# Patient Record
Sex: MALE | Race: BLACK OR AFRICAN AMERICAN | Employment: OTHER | URBAN - METROPOLITAN AREA
[De-identification: names, ages, dates, MRNs, and addresses within clinical notes are randomized per-mention and may not be internally consistent; named-entity substitution may affect disease eponyms.]

---

## 2017-10-11 ENCOUNTER — HOSPITAL ENCOUNTER (OUTPATIENT)
Dept: OTHER | Age: 82
Discharge: HOME | End: 2017-10-11
Payer: MEDICARE

## 2017-10-11 DIAGNOSIS — M25.551 BILATERAL HIP PAIN: ICD-10-CM

## 2017-10-11 DIAGNOSIS — M25.552 BILATERAL HIP PAIN: ICD-10-CM

## 2017-10-11 PROCEDURE — 73502 X-RAY EXAM HIP UNI 2-3 VIEWS: CPT

## 2017-11-07 PROBLEM — R97.20 ELEVATED PSA: Status: ACTIVE | Noted: 2017-11-07

## 2018-02-08 ENCOUNTER — APPOINTMENT (OUTPATIENT)
Dept: GENERAL RADIOLOGY | Age: 83
End: 2018-02-08
Attending: PHYSICIAN ASSISTANT
Payer: MEDICARE

## 2018-02-08 ENCOUNTER — HOSPITAL ENCOUNTER (EMERGENCY)
Age: 83
Discharge: HOME | End: 2018-02-08
Attending: EMERGENCY MEDICINE
Payer: MEDICARE

## 2018-02-08 VITALS
WEIGHT: 135 LBS | HEART RATE: 93 BPM | OXYGEN SATURATION: 98 % | DIASTOLIC BLOOD PRESSURE: 59 MMHG | HEIGHT: 66 IN | RESPIRATION RATE: 16 BRPM | TEMPERATURE: 98 F | BODY MASS INDEX: 21.69 KG/M2 | SYSTOLIC BLOOD PRESSURE: 124 MMHG

## 2018-02-08 DIAGNOSIS — M72.2 PLANTAR FASCIITIS: Primary | ICD-10-CM

## 2018-02-08 DIAGNOSIS — M79.672 LEFT FOOT PAIN: ICD-10-CM

## 2018-02-08 PROCEDURE — 99283 EMERGENCY DEPT VISIT LOW MDM: CPT

## 2018-02-08 PROCEDURE — 73630 X-RAY EXAM OF FOOT: CPT

## 2018-02-08 PROCEDURE — 74011250637 HC RX REV CODE- 250/637: Performed by: PHYSICIAN ASSISTANT

## 2018-02-08 RX ORDER — IBUPROFEN 600 MG/1
600 TABLET ORAL
Status: COMPLETED | OUTPATIENT
Start: 2018-02-08 | End: 2018-02-08

## 2018-02-08 RX ORDER — TRAMADOL HYDROCHLORIDE 50 MG/1
50 TABLET ORAL
Qty: 5 TAB | Refills: 0 | Status: SHIPPED | OUTPATIENT
Start: 2018-02-08 | End: 2019-04-03

## 2018-02-08 RX ADMIN — IBUPROFEN 600 MG: 600 TABLET, FILM COATED ORAL at 09:01
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
## 2018-02-08 NOTE — DISCHARGE INSTRUCTIONS
Foot Pain: Care Instructions  Your Care Instructions  Foot injuries that cause pain and swelling are fairly common. Almost all sports or home repair projects can cause a misstep that ends up as foot pain. Normal wear and tear, especially as you get older, also can cause foot pain. Most minor foot injuries will heal on their own, and home treatment is usually all you need to do. If you have a severe injury, you may need tests and treatment. Follow-up care is a key part of your treatment and safety. Be sure to make and go to all appointments, and call your doctor if you are having problems. It's also a good idea to know your test results and keep a list of the medicines you take. How can you care for yourself at home? · Take pain medicines exactly as directed. ¨ If the doctor gave you a prescription medicine for pain, take it as prescribed. ¨ If you are not taking a prescription pain medicine, ask your doctor if you can take an over-the-counter medicine. · Rest and protect your foot. Take a break from any activity that may cause pain. · Put ice or a cold pack on your foot for 10 to 20 minutes at a time. Put a thin cloth between the ice and your skin. · Prop up the sore foot on a pillow when you ice it or anytime you sit or lie down during the next 3 days. Try to keep it above the level of your heart. This will help reduce swelling. · Your doctor may recommend that you wrap your foot with an elastic bandage. Keep your foot wrapped for as long as your doctor advises. · If your doctor recommends crutches, use them as directed. · Wear roomy footwear. · As soon as pain and swelling end, begin gentle exercises of your foot. Your doctor can tell you which exercises will help. When should you call for help? Call 911 anytime you think you may need emergency care. For example, call if:  ? · Your foot turns pale, white, blue, or cold.    ?Call your doctor now or seek immediate medical care if:  ? · You cannot move or stand on your foot. ? · Your foot looks twisted or out of its normal position. ? · Your foot is not stable when you step down. ? · You have signs of infection, such as:  ¨ Increased pain, swelling, warmth, or redness. ¨ Red streaks leading from the sore area. ¨ Pus draining from a place on your foot. ¨ A fever. ? · Your foot is numb or tingly. ? Watch closely for changes in your health, and be sure to contact your doctor if:  ? · You do not get better as expected. ? · You have bruises from an injury that last longer than 2 weeks. Where can you learn more? Go to http://luciano-chip.info/. Enter K560 in the search box to learn more about \"Foot Pain: Care Instructions. \"  Current as of: March 21, 2017  Content Version: 11.4  © 0231-5899 [employer]. Care instructions adapted under license by [employer] (which disclaims liability or warranty for this information). If you have questions about a medical condition or this instruction, always ask your healthcare professional. [address] any warranty or liability for your use of this information. Plantar Fasciitis: Care Instructions  Your Care Instructions    Plantar fasciitis is pain and inflammation of the plantar fascia, the tissue at the bottom of your foot that connects the heel bone to the toes. The plantar fascia also supports the arch. If you strain the plantar fascia, it can develop small tears and cause heel pain when you stand or walk. Plantar fasciitis can be caused by running or other sports. It also may occur in people who are overweight or who have high arches or flat feet. You may get plantar fasciitis if you walk or stand for long periods, or have a tight Achilles tendon or calf muscles. You can improve your foot pain with rest and other care at home. It might take a few weeks to a few months for your foot to heal completely.   Follow-up care is a key part of your treatment and safety. Be sure to make and go to all appointments, and call your doctor if you are having problems. It's also a good idea to know your test results and keep a list of the medicines you take. How can you care for yourself at home? · Rest your feet often. Reduce your activity to a level that lets you avoid pain. If possible, do not run or walk on hard surfaces. · Take pain medicines exactly as directed. ¨ If the doctor gave you a prescription medicine for pain, take it as prescribed. ¨ If you are not taking a prescription pain medicine, take an over-the-counter anti-inflammatory medicine for pain and swelling, such as ibuprofen (Advil, Motrin) or naproxen (Aleve). Read and follow all instructions on the label. · Use ice massage to help with pain and swelling. You can use an ice cube or an ice cup several times a day. To make an ice cup, fill a paper cup with water and freeze it. Cut off the top of the cup until a half-inch of ice shows. Hold onto the remaining paper to use the cup. Rub the ice in small circles over the area for 5 to 7 minutes. · Contrast baths, which alternate hot and cold water, can also help reduce swelling. But because heat alone may make pain and swelling worse, end a contrast bath with a soak in cold water. · Wear a night splint if your doctor suggests it. A night splint holds your foot with the toes pointed up and the foot and ankle at a 90-degree angle. This position gives the bottom of your foot a constant, gentle stretch. · Do simple exercises such as calf stretches and towel stretches 2 to 3 times each day, especially when you first get up in the morning. These can help the plantar fascia become more flexible. They also make the muscles that support your arch stronger. Hold these stretches for 15 to 30 seconds per stretch. Repeat 2 to 4 times. ¨ Stand about 1 foot from a wall. Place the palms of both hands against the wall at chest level.  Lean forward against the wall, keeping one leg with the knee straight and heel on the ground while bending the knee of the other leg. ¨ Sit down on the floor or a mat with your feet stretched in front of you. Roll up a towel lengthwise, and loop it over the ball of your foot. Holding the towel at both ends, gently pull the towel toward you to stretch your foot. · Wear shoes with good arch support. Athletic shoes or shoes with a well-cushioned sole are good choices. · Try heel cups or shoe inserts (orthotics) to help cushion your heel. You can buy these at many shoe stores. · Put on your shoes as soon as you get out of bed. Going barefoot or wearing slippers may make your pain worse. · Reach and stay at a good weight for your height. This puts less strain on your feet. When should you call for help? Call your doctor now or seek immediate medical care if:  · You have heel pain with fever, redness, or warmth in your heel. · You cannot put weight on the sore foot. Watch closely for changes in your health, and be sure to contact your doctor if:  · You have numbness or tingling in your heel. · Your heel pain lasts more than 2 weeks. Where can you learn more? Go to http://luciano-chip.info/. Jeancarlos Crowley in the search box to learn more about \"Plantar Fasciitis: Care Instructions. \"  Current as of: March 21, 2017  Content Version: 11.4  © 7897-5635 [employer]. Care instructions adapted under license by [employer] (which disclaims liability or warranty for this information). If you have questions about a medical condition or this instruction, always ask your healthcare professional. [place] [address] any warranty or liability for your use of this information. Arch Pain: Exercises  Your Care Instructions  Here are some examples of typical rehabilitation exercises for your condition. Start each exercise slowly. Ease off the exercise if you start to have pain.   Your doctor or physical therapist will tell you when you can start these exercises and which ones will work best for you. How to do the exercises  Plantar fascia stretch    1. Sit in a chair and put your affected foot on your other knee. 2. Hold the heel of your foot in one hand, and grasp your toes with the other hand. 3. Pull on your heel (toward your body), and at the same time pull your toes back with your other hand. 4. You should feel a stretch along the bottom of your foot. 5. Hold 15 to 30 seconds. 6. Repeat 2 to 4 times. Plantar fascia stretch (kneeling)    You may want to place a pillow under your knees for this exercise. 1. Get on your hands and knees on the floor. Keep your heels pointing up and the balls of your feet and your toes on the floor. 2. Slowly sit back toward your ankles. 3. If this is too hard, you can try doing it one leg at a time. Stand up, and then kneel on one knee and keep the other leg forward. Place the foot of your forward leg flat on the ground and bend that knee. The heel on the leg still behind you should point up. The ball and toes of that foot should be on the floor. Sit back toward that ankle. 4. Hold 15 to 30 seconds. 5. Repeat 2 to 4 times. Switch legs if you are doing this one leg at a time. Plantar fascia self-massage    1. Sit in a chair. 2. Place your affected foot on a firm, tube-shaped object, such as a can or water bottle. 3. Roll your foot back and forth over the object to massage the bottom of your foot. 4. If you want to do ice massage, fill a water bottle about three-fourths of the way full and freeze before using. 5. Continue for 2 to 5 minutes. Bilateral calf stretch (knees straight)    1. Place a book on the floor a few inches from a wall or countertop, and put the balls of your feet on it. Your heels should be on the floor. The book needs to be thick enough so that you can feel a gentle stretch in each calf.  If you are not steady on your feet, hold on to a chair, counter, or wall while you do this stretch. 2. Keep your knees straight, and lean forward until you feel a stretch in each calf. 3. To get more stretch, add another book or use a thicker book, such as a phone book, a dictionary, or an encyclopedia. 4. Hold the stretch for at least 15 to 30 seconds. 5. Repeat 2 to 4 times. Bilateral calf stretch (knees bent)    1. Place a book on the floor a few inches from a wall or countertop, and put the balls of your feet on it. Your heels should be on the floor. The book needs to be thick enough so that you can feel a gentle stretch in each calf. If you are not steady on your feet, hold on to a chair, counter, or wall while you do this stretch. 2. Bend your knees, and lean forward until you feel a stretch in each calf. 3. To get more stretch, add another book or use a thicker book, such as a phone book, a dictionary, or an encyclopedia. 4. Hold the stretch for at least 15 to 30 seconds. 5. Repeat 2 to 4 times. [place] pick-ups    1. Put some marbles on the floor next to a cup.  2. Sit down, and use the toes of your affected foot to lift up one marble from the floor at a time. Then try to put the marble in the cup.  3. Repeat 8 to 12 times. Towel scrunches    1. Sit down, and place your affected foot on a towel on the floor. You may also do this with both feet on the towel. 2. Scrunch the towel toward you with your toes. Then use your toes to push the towel back into place. 3. Repeat 8 to 12 times. Heel raises on a step    1. Stand on the bottom step of a staircase, facing up toward the stairs. Put the balls of your feet on the step. If you are not steady on your feet, hold on to the banister or wall. 2. Keeping both knees straight, slowly lift your heels above the step so that you are standing on your toes. Then slowly lower your heels below the step and toward the floor. 3. Return to the starting position, with your feet even with the step.   4. Repeat 8 to 12 times. Follow-up care is a key part of your treatment and safety. Be sure to make and go to all appointments, and call your doctor if you are having problems. It's also a good idea to know your test results and keep a list of the medicines you take. Where can you learn more? Go to http://luciano-chip.info/. Enter H119 in the search box to learn more about \"Arch Pain: Exercises. \"  Current as of: March 21, 2017  Content Version: 11.4  © 2579-0555 [employer]. Care instructions adapted under license by [employer] (which disclaims liability or warranty for this information). If you have questions about a medical condition or this instruction, always ask your healthcare professional. [address] any warranty or liability for your use of this information.

## 2018-02-08 NOTE — ED TRIAGE NOTES
Left foot pain after getting pedicure last Thursday. Soaked foot in epson salts and rubbed pain cream on it.

## 2018-04-16 ENCOUNTER — HOSPITAL ENCOUNTER (OUTPATIENT)
Dept: GENERAL RADIOLOGY | Age: 83
Discharge: HOME | End: 2018-04-16
Payer: MEDICARE

## 2018-04-16 DIAGNOSIS — M25.511 RIGHT SHOULDER PAIN: ICD-10-CM

## 2018-04-16 PROCEDURE — 73030 X-RAY EXAM OF SHOULDER: CPT

## 2019-02-05 ENCOUNTER — DOCUMENTATION ONLY (OUTPATIENT)
Dept: FAMILY MEDICINE CLINIC | Age: 84
End: 2019-02-05

## 2019-02-05 NOTE — PROGRESS NOTES
Patient did not show up to their scheduled new patient appt on 2/4/19. Per Dr. Kisha Benitez patient is discharged from this practice. Thank you.

## 2019-02-05 NOTE — PROGRESS NOTES
New patient did not arrive to their scheduled appointment on 2/4/19. No show letter #1 sent on 02/05/19. Thank you.

## 2019-02-21 ENCOUNTER — OFFICE VISIT (OUTPATIENT)
Dept: FAMILY MEDICINE CLINIC | Age: 84
End: 2019-02-21

## 2019-02-21 ENCOUNTER — HOSPITAL ENCOUNTER (OUTPATIENT)
Dept: LAB | Age: 84
Discharge: HOME | End: 2019-02-21
Payer: MEDICARE

## 2019-02-21 ENCOUNTER — TELEPHONE (OUTPATIENT)
Dept: FAMILY MEDICINE CLINIC | Age: 84
End: 2019-02-21

## 2019-02-21 VITALS
WEIGHT: 130 LBS | OXYGEN SATURATION: 96 % | HEART RATE: 91 BPM | HEIGHT: 67 IN | RESPIRATION RATE: 16 BRPM | DIASTOLIC BLOOD PRESSURE: 78 MMHG | BODY MASS INDEX: 20.4 KG/M2 | TEMPERATURE: 96.3 F | SYSTOLIC BLOOD PRESSURE: 115 MMHG

## 2019-02-21 DIAGNOSIS — D17.0 LIPOMA OF NECK: ICD-10-CM

## 2019-02-21 DIAGNOSIS — E78.00 HYPERCHOLESTEROLEMIA: ICD-10-CM

## 2019-02-21 DIAGNOSIS — M67.432 GANGLION CYST OF BOTH WRISTS: ICD-10-CM

## 2019-02-21 DIAGNOSIS — R80.9 MICROALBUMINURIA: ICD-10-CM

## 2019-02-21 DIAGNOSIS — E11.65 TYPE 2 DIABETES MELLITUS WITH HYPERGLYCEMIA, WITHOUT LONG-TERM CURRENT USE OF INSULIN (HCC): Primary | ICD-10-CM

## 2019-02-21 DIAGNOSIS — N40.1 HYPERPLASIA OF PROSTATE WITH LOWER URINARY TRACT SYMPTOMS (LUTS): ICD-10-CM

## 2019-02-21 DIAGNOSIS — E11.65 TYPE 2 DIABETES MELLITUS WITH HYPERGLYCEMIA, WITHOUT LONG-TERM CURRENT USE OF INSULIN (HCC): ICD-10-CM

## 2019-02-21 DIAGNOSIS — M67.431 GANGLION CYST OF BOTH WRISTS: ICD-10-CM

## 2019-02-21 DIAGNOSIS — N52.01 ERECTILE DYSFUNCTION DUE TO ARTERIAL INSUFFICIENCY: ICD-10-CM

## 2019-02-21 PROBLEM — N40.0 BPH (BENIGN PROSTATIC HYPERPLASIA): Status: ACTIVE | Noted: 2019-02-21

## 2019-02-21 PROBLEM — E11.21 TYPE 2 DIABETES WITH NEPHROPATHY (HCC): Status: ACTIVE | Noted: 2019-02-21

## 2019-02-21 PROBLEM — E11.9 DIABETES (HCC): Status: ACTIVE | Noted: 2019-02-21

## 2019-02-21 LAB
ANION GAP SERPL CALC-SCNC: 8 MMOL/L (ref 3–18)
BUN SERPL-MCNC: 17 MG/DL (ref 7–18)
BUN/CREAT SERPL: 16 (ref 12–20)
CALCIUM SERPL-MCNC: 9.2 MG/DL (ref 8.5–10.1)
CHLORIDE SERPL-SCNC: 107 MMOL/L (ref 100–108)
CHOLEST SERPL-MCNC: 152 MG/DL
CO2 SERPL-SCNC: 27 MMOL/L (ref 21–32)
CREAT SERPL-MCNC: 1.08 MG/DL (ref 0.6–1.3)
GLUCOSE SERPL-MCNC: 182 MG/DL (ref 74–99)
HBA1C MFR BLD HPLC: 8.8 %
HDLC SERPL-MCNC: 73 MG/DL (ref 40–60)
HDLC SERPL: 2.1 {RATIO} (ref 0–5)
LDLC SERPL CALC-MCNC: 62.2 MG/DL (ref 0–100)
LIPID PROFILE,FLP: ABNORMAL
MICROALBUMIN UR TEST STR-MCNC: 150 MG/L
MICROALBUMIN/CREAT RATIO POC: >300 MG/G
POTASSIUM SERPL-SCNC: 4.8 MMOL/L (ref 3.5–5.5)
SODIUM SERPL-SCNC: 142 MMOL/L (ref 136–145)
TRIGL SERPL-MCNC: 84 MG/DL (ref ?–150)
VLDLC SERPL CALC-MCNC: 16.8 MG/DL

## 2019-02-21 PROCEDURE — 80061 LIPID PANEL: CPT

## 2019-02-21 PROCEDURE — 80048 BASIC METABOLIC PNL TOTAL CA: CPT

## 2019-02-21 PROCEDURE — 36415 COLL VENOUS BLD VENIPUNCTURE: CPT

## 2019-02-21 RX ORDER — FINASTERIDE 5 MG/1
5 TABLET, FILM COATED ORAL DAILY
COMMUNITY
End: 2019-04-03 | Stop reason: SDUPTHER

## 2019-02-21 RX ORDER — NEOMYCIN/POLYMYXIN B/HYDROCORT 3.5-10K-1
1 SUSPENSION, DROPS(FINAL DOSAGE FORM)(ML) OPHTHALMIC (EYE) 4 TIMES DAILY
COMMUNITY
End: 2019-04-03 | Stop reason: SDUPTHER

## 2019-02-21 RX ORDER — SILDENAFIL 50 MG/1
50 TABLET, FILM COATED ORAL AS NEEDED
Qty: 10 TAB | Refills: 12 | Status: SHIPPED | OUTPATIENT
Start: 2019-02-21 | End: 2019-06-07

## 2019-02-21 RX ORDER — SILDENAFIL 50 MG/1
50 TABLET, FILM COATED ORAL AS NEEDED
Qty: 10 TAB | Refills: 12 | Status: SHIPPED | OUTPATIENT
Start: 2019-02-21 | End: 2019-02-21 | Stop reason: SDUPTHER

## 2019-02-21 RX ORDER — PIOGLITAZONEHYDROCHLORIDE 45 MG/1
45 TABLET ORAL DAILY
Qty: 30 TAB | Refills: 2 | Status: SHIPPED | OUTPATIENT
Start: 2019-02-21 | End: 2019-04-03 | Stop reason: SDUPTHER

## 2019-02-21 RX ORDER — PIOGLITAZONEHYDROCHLORIDE 30 MG/1
TABLET ORAL DAILY
COMMUNITY
End: 2019-02-21

## 2019-02-21 RX ORDER — PRAVASTATIN SODIUM 40 MG/1
40 TABLET ORAL
COMMUNITY
End: 2019-04-03 | Stop reason: SDUPTHER

## 2019-02-21 NOTE — TELEPHONE ENCOUNTER
Pharmacy called in and stated that the prescription sent in for the freestyle test strips was for a different machine and the patient has freestyle light so they were wanting to know if they could get a new prescription sent over for he freestyle light test strips. Please advise.

## 2019-02-21 NOTE — PROGRESS NOTES
Chief Complaint Patient presents with  Mass  
  behind neck  Gait Problem  Medication Refill  
  on test strips and medications 1. Have you been to the ER, urgent care clinic since your last visit? Hospitalized since your last visit? No 
 
2. Have you seen or consulted any other health care providers outside of the [address] [place] [address] since your last visit? Include any pap smears or colon screening.  No

## 2019-02-21 NOTE — PROGRESS NOTES
Valentina Harrison is a 81 y.o.  male and presents with Chief Complaint Patient presents with  Mass  
  behind neck  Gait Problem  Medication Refill  
  on test strips and medications Duplicate record: [id number] Subjective: 
Mr. Brenna Augustine presents c/o mass behind his neck which has been presen for several months. He denies pain but he does not like it. He denies numbness or tingling in his arms. Diabetes Mellitus Patient presents for evaluation of for follow up on diabetes. Onset of symptoms was problem is longstanding. He describes symptoms as erectile dysfunction. Course to date has been symptoms have progressed to a point and plateaued. . Patient denies polyuria, polydipsia, chest pain, dyspnea on exertion and swelling. Home sugars are running: BGs have been labile ranging between 130 and 225 Previous visits for this problem: multiple, this is a diagnosis of longstanding. Last seen 1 year ago by emergency department at [facility]. Evaluation to date has been see lab results. Treatment goals: Glycemic control: fair Treatment to date has been taking jardiance and pioglitazone. He has not had falls but his legs give out on him. He has not had any falls. Patient Active Problem List  
Diagnosis Code  Diabetes ([facility] [address] E11.9  BPH (benign prostatic hyperplasia) N40.0  Hypercholesterolemia E78.00 Patient Active Problem List  
 Diagnosis Date Noted  Diabetes ([facility] 02/21/2019  BPH (benign prostatic hyperplasia) 02/21/2019  Hypercholesterolemia 02/21/2019 Current Outpatient Medications Medication Sig Dispense Refill  finasteride (PROSCAR) 5 mg tablet Take 5 mg by mouth daily.  insulin detemir U-100 (LEVEMIR FLEXTOUCH) 100 unit/mL (3 mL) inpn 10 Units by SubCUTAneous route as needed.  empagliflozin (JARDIANCE) 25 mg tablet Take  by mouth daily.  pioglitazone (ACTOS) 30 mg tablet Take  by mouth daily.  pravastatin (PRAVACHOL) 40 mg tablet Take 40 mg by mouth nightly.  neomycin-polymyxin-hc (CORTISPORIN) 3.5-10,000-10 mg-unit-mg/mL ophthalmic suspension Administer 1 Drop to both eyes four (4) times daily. No Known Allergies Past Medical History:  
Diagnosis Date  BPH (benign prostatic hyperplasia)  Diabetes ([facility] [address]  Hypercholesterolemia History reviewed. No pertinent surgical history. No family history on file. Social History Tobacco Use  Smoking status: Current Some Day Smoker Packs/day: 0.25  Smokeless tobacco: Never Used  Tobacco comment: he is cutting down Substance Use Topics  Alcohol use: Yes Frequency: Never Comment: social  
 
ROS General ROS: negative for - chills or fever Psychological ROS: negative for - anxiety or depression Ophthalmic ROS: positive for - blurry vision Endocrine ROS: negative for - polydipsia/polyuria or temperature intolerance Respiratory ROS: no cough, shortness of breath, or wheezing Cardiovascular ROS: no chest pain or dyspnea on exertion Gastrointestinal ROS: no abdominal pain, change in bowel habits, or black or bloody stools Genito-Urinary ROS: positive for - erectile dysfunction and nocturia Musculoskeletal ROS: positive for - pain in hip - left Neurological ROS: negative for - numbness/tingling or weakness Dermatological ROS: lump on neck All other systems reviewed and are negative. Objective: 
Vitals:  
 02/21/19 0758 BP: 115/78 Pulse: 91  
Resp: 16 Temp: 96.3 °F (35.7 °C) TempSrc: Oral  
SpO2: 96% Weight: 130 lb (59 kg) Height: 5' 6.5\" (1.689 m) PainSc:   0 - No pain  
 
alert, well appearing, and in no distress, oriented to person, place, and time, normal appearing weight Mental status - normal mood, behavior, speech, dress, motor activity, and thought processes Chest - clear to auscultation, no wheezes, rales or rhonchi, symmetric air entry Heart - normal rate, regular rhythm, normal S1, S2, no murmurs, rubs, clicks or gallops Neurological - cranial nerves II through XII intact, guarded gait Extremities - peripheral pulses normal, no pedal edema, no clubbing or cyanosis Skin - lump on posterior neck 
msk - pain with hip movement; bilateral hand ganglion cysts LABS  
hgb a1c 8.8 
microalbumin 150 Assessment/Plan: 1. Type 2 diabetes mellitus with hyperglycemia, without long-term current use of insulin (HCC) Goal hgb a1c <7; not at goal; increase pioglitazone dose to 45 mg daily; screen for nephropathy - AMB POC HEMOGLOBIN A1C 
- glucose blood VI test strips (FREESTYLE TEST) strip; Test blood glucose twice a day  Dispense: 100 Strip; Refill: 1 2. Hypercholesterolemia Assess control with lipid panel 3. Hyperplasia of prostate with lower urinary tract symptoms (LUTS) Continue finasteride and f/u with urology 4. Erectile dysfunction due to arterial insufficiency Start sildenafil 
- sildenafil citrate (VIAGRA) 50 mg tablet; Take 1 Tab by mouth as needed. Dispense: 10 Tab; Refill: 12 
 
5. Lipoma of neck Pt reassured; he is not seeking surgical excision at this time 6. Ganglion cyst of both wrists Reassured patient Lab review: labs reviewed, I note that glycosylated hemoglobin abnormal abnormal 
 
 
I have discussed the diagnosis with the patient and the intended plan as seen in the above orders. The patient has received an after-visit summary and questions were answered concerning future plans. I have discussed medication side effects and warnings with the patient as well. I have reviewed the plan of care with the patient, accepted their input and they are in agreement with the treatment goals. Follow-up Disposition: 
Return in about 3 months (around 5/21/2019) for medication evaluation.

## 2019-02-21 NOTE — PATIENT INSTRUCTIONS
Proteinuria: Care Instructions Your Care Instructions Proteinuria means that you have too much protein in your urine. This is usually caused by a kidney problem. Your body's blood passes through your kidneys. Normally, the kidneys remove waste from the blood. The waste then leaves the body in the urine. But they don't let protein leave the body. If your kidneys are not working well, they let too much protein get in your urine. A high level of protein in your urine is a sign that something is harming your kidneys. It may be puzzling to find out that you have a problem with your kidneys, because you probably don't feel different. Your doctor may do more tests to find out what is causing the protein to get into your urine. Possible causes include an infection or a medical condition, such as diabetes or heart disease. You may need regular urine tests in the future. You may be able to reduce the protein in your urine by getting exercise, eating a healthy diet, and taking medicine. Follow-up care is a key part of your treatment and safety. Be sure to make and go to all appointments, and call your doctor if you are having problems. It's also a good idea to know your test results and keep a list of the medicines you take. How can you care for yourself at home? · Take your medicines exactly as prescribed. Call your doctor if you think you are having a problem with your medicine. You will get more details on the specific medicines your doctor prescribes. · Work with your doctor and dietitian to set up a diet that will be healthy for you. You may need to: 
? Eat a heart-healthy diet to keep the fat (cholesterol) in your blood under control. ? Eat a low-salt diet to keep your blood pressure at normal levels. ? Limit protein in your foods. ? Limit the amount of fluids you drink. · If you have diabetes, try to keep your blood sugar at normal or near-normal levels. ? Follow your diet. Eat a variety of foods. Spread carbohydrate throughout your meals. ? If your doctor recommends it, get more exercise. Walking is a good choice. Bit by bit, increase the amount you walk every day. Try for at least 30 minutes on most days of the week. ? Check your blood sugar as often as your doctor recommends. · Do not smoke. Smoking raises your risk of many health problems, including kidney damage. If you need help quitting, talk to your doctor about stop-smoking programs and medicines. These can increase your chances of quitting for good. · Do not take ibuprofen, naproxen, acetaminophen (Tylenol), or similar medicines, unless your doctor tells you to. These medicines may make kidney problems worse. · Check with your doctor before you take any herbal supplements or over-the-counter medicines. When should you call for help? Watch closely for changes in your health, and be sure to contact your doctor if: 
  · You do not get better as expected. Where can you learn more? Go to http://luciano-chip.info/. Enter B927 in the search box to learn more about \"Proteinuria: Care Instructions. \" Current as of: March 14, 2018 Content Version: 11.9 © 7150-8289 [employer]. Care instructions adapted under license by [employer] (which disclaims liability or warranty for this information). If you have questions about a medical condition or this instruction, always ask your healthcare professional. [address] any warranty or liability for your use of this information.

## 2019-03-01 NOTE — TELEPHONE ENCOUNTER
[facility] called, pt downstairs upset that Freestyle Lite is not covered by insurance. VBO given for One touch Verio Machine, strips and lancets. Encounter completed.

## 2019-04-02 DIAGNOSIS — E11.65 TYPE 2 DIABETES MELLITUS WITH HYPERGLYCEMIA, WITHOUT LONG-TERM CURRENT USE OF INSULIN (HCC): ICD-10-CM

## 2019-04-02 RX ORDER — FINASTERIDE 5 MG/1
5 TABLET, FILM COATED ORAL DAILY
Qty: 30 TAB | Refills: 2 | OUTPATIENT
Start: 2019-04-02

## 2019-04-02 RX ORDER — PIOGLITAZONEHYDROCHLORIDE 30 MG/1
TABLET ORAL
Qty: 30 TAB | Refills: 3 | OUTPATIENT
Start: 2019-04-02

## 2019-04-02 RX ORDER — PIOGLITAZONEHYDROCHLORIDE 45 MG/1
45 TABLET ORAL DAILY
Qty: 30 TAB | Refills: 2 | OUTPATIENT
Start: 2019-04-02

## 2019-04-02 NOTE — TELEPHONE ENCOUNTER
Please see medication refill request. Patient came into the office stating he needed medications. Please advise.  Thank you

## 2019-04-03 ENCOUNTER — HOSPITAL ENCOUNTER (OUTPATIENT)
Dept: GENERAL RADIOLOGY | Age: 84
Discharge: HOME | DRG: 330 | End: 2019-04-03
Payer: MEDICARE

## 2019-04-03 ENCOUNTER — OFFICE VISIT (OUTPATIENT)
Dept: FAMILY MEDICINE CLINIC | Age: 84
End: 2019-04-03

## 2019-04-03 VITALS
DIASTOLIC BLOOD PRESSURE: 60 MMHG | BODY MASS INDEX: 20.88 KG/M2 | HEART RATE: 69 BPM | HEIGHT: 67 IN | SYSTOLIC BLOOD PRESSURE: 122 MMHG | TEMPERATURE: 96.6 F | RESPIRATION RATE: 17 BRPM | WEIGHT: 133 LBS | OXYGEN SATURATION: 96 %

## 2019-04-03 DIAGNOSIS — M25.552 LEFT HIP PAIN: ICD-10-CM

## 2019-04-03 DIAGNOSIS — Z00.00 MEDICARE ANNUAL WELLNESS VISIT, SUBSEQUENT: ICD-10-CM

## 2019-04-03 DIAGNOSIS — E11.65 TYPE 2 DIABETES MELLITUS WITH HYPERGLYCEMIA, WITHOUT LONG-TERM CURRENT USE OF INSULIN (HCC): ICD-10-CM

## 2019-04-03 DIAGNOSIS — Z71.89 ADVANCE CARE PLANNING: ICD-10-CM

## 2019-04-03 DIAGNOSIS — H10.32 ACUTE BACTERIAL CONJUNCTIVITIS OF LEFT EYE: ICD-10-CM

## 2019-04-03 DIAGNOSIS — M25.552 LEFT HIP PAIN: Primary | ICD-10-CM

## 2019-04-03 DIAGNOSIS — N40.1 BENIGN PROSTATIC HYPERPLASIA WITH LOWER URINARY TRACT SYMPTOMS, SYMPTOM DETAILS UNSPECIFIED: ICD-10-CM

## 2019-04-03 DIAGNOSIS — E78.00 HYPERCHOLESTEROLEMIA: ICD-10-CM

## 2019-04-03 PROCEDURE — 73502 X-RAY EXAM HIP UNI 2-3 VIEWS: CPT

## 2019-04-03 RX ORDER — FINASTERIDE 5 MG/1
TABLET, FILM COATED ORAL
Qty: 90 TAB | Refills: 3 | Status: SHIPPED | OUTPATIENT
Start: 2019-04-03 | End: 2021-05-25 | Stop reason: SDUPTHER

## 2019-04-03 RX ORDER — NEOMYCIN/POLYMYXIN B/HYDROCORT 3.5-10K-1
1 SUSPENSION, DROPS(FINAL DOSAGE FORM)(ML) OPHTHALMIC (EYE) 4 TIMES DAILY
Qty: 1 BOTTLE | Refills: 0 | Status: SHIPPED | OUTPATIENT
Start: 2019-04-03 | End: 2019-05-09 | Stop reason: SDUPTHER

## 2019-04-03 RX ORDER — PIOGLITAZONEHYDROCHLORIDE 45 MG/1
45 TABLET ORAL DAILY
Qty: 90 TAB | Refills: 3 | Status: SHIPPED | OUTPATIENT
Start: 2019-04-03 | End: 2020-07-02 | Stop reason: SDUPTHER

## 2019-04-03 NOTE — PROGRESS NOTES
Princess Heydi. is a 81 y.o male that is present in the office for a routine appointment for medication evaluation. Patient complains of right leg pain. 1. Have you been to the ER, urgent care clinic since your last visit? Hospitalized since your last visit? No 
 
2. Have you seen or consulted any other health care providers outside of the [address] [place] [address] since your last visit? Include any pap smears or colon screening. No 
 
Health Maintenance Due Topic Date Due  
 FOOT EXAM Q1  08/06/1944  
 EYE EXAM RETINAL OR DILATED  08/06/1944  
 DTaP/Tdap/Td series (1 - Tdap) 08/06/1956  Shingrix Vaccine Age 50> (1 of 2) 08/06/1985  GLAUCOMA SCREENING Q2Y  08/06/2000  Pneumococcal 65+ years (1 of 2 - PCV13) 08/06/2000  MEDICARE YEARLY EXAM  03/14/2018
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
## 2019-04-03 NOTE — PATIENT INSTRUCTIONS
Hip Arthritis: Exercises Your Care Instructions Here are some examples of exercises for hip arthritis. Start each exercise slowly. Ease off the exercise if you start to have pain. Your doctor or physical therapist will tell you when you can start these exercises and which ones will work best for you. How to do the exercises Straight-leg raises to the outside 1. Lie on your side, with your affected hip on top. 2. Tighten the front thigh muscles of your top leg to keep your knee straight. 3. Keep your hip and your leg straight in line with the rest of your body, and keep your knee pointing forward. Do not drop your hip back. 4. Lift your top leg straight up toward the ceiling, about 12 inches off the floor. Hold for about 6 seconds, then slowly lower your leg. 5. Repeat 8 to 12 times. 6. Switch legs and repeat steps 1 through 5, even if only one hip is sore. Straight-leg raises to the inside 1. Lie on your side with your affected hip on the floor. 2. You can either prop up your other leg on a chair, or you can bend that knee and put that foot in front of your other knee. Do not drop your hip back. 3. Tighten the muscles on the front thigh of your bottom leg to straighten that knee. 4. Keep your kneecap pointing forward and your leg straight, and lift your bottom leg up toward the ceiling about 6 inches. Hold for about 6 seconds, then lower slowly. 5. Repeat 8 to 12 times. 6. Switch legs and repeat steps 1 through 5, even if only one hip is sore. Hip hike 1. Stand sideways on the bottom step of a staircase, and hold on to the banister or wall. 2. Keeping both knees straight, lift your good leg off the step and let it hang down. Then hike your good hip up to the same level as your affected hip or a little higher. 3. Repeat 8 to 12 times. 4. Switch legs and repeat steps 1 through 3, even if only one hip is sore. Bridging 1. Lie on your back with both knees bent. Your knees should be bent about 90 degrees. 2. Then push your feet into the floor, squeeze your buttocks, and lift your hips off the floor until your shoulders, hips, and knees are all in a straight line. 3. Hold for about 6 seconds as you continue to breathe normally, and then slowly lower your hips back down to the floor and rest for up to 10 seconds. 4. Repeat 8 to 12 times. Hamstring stretch (lying down) 1. Lie flat on your back with your legs straight. If you feel discomfort in your back, place a small towel roll under your lower back. 2. Holding the back of your affected leg, lift your leg straight up and toward your body until you feel a stretch at the back of your thigh. 3. Hold the stretch for at least 30 seconds. 4. Repeat 2 to 4 times. 5. Switch legs and repeat steps 1 through 4, even if only one hip is sore. Standing quadriceps stretch 1. If you are not steady on your feet, hold on to a chair, counter, or wall. You can also lie on your stomach or your side to do this exercise. 2. Bend the knee of the leg you want to stretch, and reach behind you to grab the front of your foot or ankle with the hand on the same side. For example, if you are stretching your right leg, use your right hand. 3. Keeping your knees next to each other, pull your foot toward your buttock until you feel a gentle stretch across the front of your hip and down the front of your thigh. Your knee should be pointed directly to the ground, and not out to the side. 4. Hold the stretch for at least 15 to 30 seconds. 5. Repeat 2 to 4 times. 6. Switch legs and repeat steps 1 through 5, even if only one hip is sore. Hip rotator stretch 1. Lie on your back with both knees bent and your feet flat on the floor. 2. Put the ankle of your affected leg on your opposite thigh near your knee.  
3. Use your hand to gently push your knee away from your body until you feel a gentle stretch around your hip. 4. Hold the stretch for 15 to 30 seconds. 5. Repeat 2 to 4 times. 6. Repeat steps 1 through 5, but this time use your hand to gently pull your knee toward your opposite shoulder. 7. Switch legs and repeat steps 1 through 6, even if only one hip is sore. Knee-to-chest 
 
1. Lie on your back with your knees bent and your feet flat on the floor. 2. Bring your affected leg to your chest, keeping the other foot flat on the floor (or keeping the other leg straight, whichever feels better on your lower back). 3. Keep your lower back pressed to the floor. Hold for at least 15 to 30 seconds. 4. Relax, and lower the knee to the starting position. 5. Repeat 2 to 4 times. 6. Switch legs and repeat steps 1 through 5, even if only one hip is sore. 7. To get more stretch, put your other leg flat on the floor while pulling your knee to your chest. 
 
Clamshell 1. Lie on your side, with your affected hip on top. Keep your feet and knees together and your knees bent. 2. Raise your top knee, but keep your feet together. Do not let your hips roll back. Your legs should open up like a clamshell. 3. Hold for 6 seconds. 4. Slowly lower your knee back down. Rest for 10 seconds. 5. Repeat 8 to 12 times. 6. Switch legs and repeat steps 1 through 5, even if only one hip is sore. Follow-up care is a key part of your treatment and safety. Be sure to make and go to all appointments, and call your doctor if you are having problems. It's also a good idea to know your test results and keep a list of the medicines you take. Where can you learn more? Go to http://luciano-chip.info/. Enter F235 in the search box to learn more about \"Hip Arthritis: Exercises. \" Current as of: September 20, 2018 Content Version: 11.9 © 1780-0147 [employer].  Care instructions adapted under license by [employer] (which disclaims liability or warranty for this information). If you have questions about a medical condition or this instruction, always ask your healthcare professional. [place] [address] any warranty or liability for your use of this information.

## 2019-04-04 ENCOUNTER — ANESTHESIA EVENT (OUTPATIENT)
Dept: SURGERY | Age: 84
DRG: 330 | End: 2019-04-04
Payer: MEDICARE

## 2019-04-04 ENCOUNTER — HOSPITAL ENCOUNTER (INPATIENT)
Age: 84
LOS: 5 days | Discharge: HOME | DRG: 330 | End: 2019-04-09
Attending: EMERGENCY MEDICINE | Admitting: SURGERY
Payer: MEDICARE

## 2019-04-04 ENCOUNTER — ANESTHESIA (OUTPATIENT)
Dept: SURGERY | Age: 84
DRG: 330 | End: 2019-04-04
Payer: MEDICARE

## 2019-04-04 ENCOUNTER — APPOINTMENT (OUTPATIENT)
Dept: GENERAL RADIOLOGY | Age: 84
DRG: 330 | End: 2019-04-04
Attending: EMERGENCY MEDICINE
Payer: MEDICARE

## 2019-04-04 ENCOUNTER — APPOINTMENT (OUTPATIENT)
Dept: CT IMAGING | Age: 84
DRG: 330 | End: 2019-04-04
Attending: EMERGENCY MEDICINE
Payer: MEDICARE

## 2019-04-04 DIAGNOSIS — K57.20 PERFORATED DIVERTICULUM OF LARGE INTESTINE: ICD-10-CM

## 2019-04-04 DIAGNOSIS — D72.829 LEUKOCYTOSIS, UNSPECIFIED TYPE: ICD-10-CM

## 2019-04-04 DIAGNOSIS — K66.8 PNEUMOPERITONEUM OF UNKNOWN ETIOLOGY: Primary | ICD-10-CM

## 2019-04-04 PROBLEM — Z98.890 STATUS POST EXPLORATORY LAPAROTOMY: Status: ACTIVE | Noted: 2019-04-04

## 2019-04-04 LAB
ALBUMIN SERPL-MCNC: 3.8 G/DL (ref 3.4–5)
ALBUMIN/GLOB SERPL: 0.9 {RATIO} (ref 0.8–1.7)
ALP SERPL-CCNC: 79 U/L (ref 45–117)
ALT SERPL-CCNC: 20 U/L (ref 16–61)
ANION GAP SERPL CALC-SCNC: 9 MMOL/L (ref 3–18)
APTT PPP: 33.7 SEC (ref 23–36.4)
AST SERPL-CCNC: 17 U/L (ref 15–37)
BASOPHILS # BLD: 0 K/UL (ref 0–0.1)
BASOPHILS NFR BLD: 0 % (ref 0–2)
BILIRUB SERPL-MCNC: 0.8 MG/DL (ref 0.2–1)
BUN SERPL-MCNC: 24 MG/DL (ref 7–18)
BUN/CREAT SERPL: 20 (ref 12–20)
CALCIUM SERPL-MCNC: 9 MG/DL (ref 8.5–10.1)
CHLORIDE SERPL-SCNC: 109 MMOL/L (ref 100–108)
CO2 SERPL-SCNC: 24 MMOL/L (ref 21–32)
CREAT SERPL-MCNC: 1.23 MG/DL (ref 0.6–1.3)
DIFFERENTIAL METHOD BLD: ABNORMAL
EOSINOPHIL # BLD: 0 K/UL (ref 0–0.4)
EOSINOPHIL NFR BLD: 0 % (ref 0–5)
ERYTHROCYTE [DISTWIDTH] IN BLOOD BY AUTOMATED COUNT: 13.4 % (ref 11.6–14.5)
GLOBULIN SER CALC-MCNC: 4.1 G/DL (ref 2–4)
GLUCOSE BLD STRIP.AUTO-MCNC: 78 MG/DL (ref 70–110)
GLUCOSE BLD STRIP.AUTO-MCNC: 84 MG/DL (ref 70–110)
GLUCOSE SERPL-MCNC: 198 MG/DL (ref 74–99)
HCT VFR BLD AUTO: 50.8 % (ref 36–48)
HGB BLD-MCNC: 17.3 G/DL (ref 13–16)
INR PPP: 0.9 (ref 0.8–1.2)
LACTATE BLD-SCNC: 1.98 MMOL/L (ref 0.4–2)
LIPASE SERPL-CCNC: 46 U/L (ref 73–393)
LYMPHOCYTES # BLD: 0.6 K/UL (ref 0.9–3.6)
LYMPHOCYTES NFR BLD: 4 % (ref 21–52)
MCH RBC QN AUTO: 33.5 PG (ref 24–34)
MCHC RBC AUTO-ENTMCNC: 34.1 G/DL (ref 31–37)
MCV RBC AUTO: 98.3 FL (ref 74–97)
MONOCYTES # BLD: 0.4 K/UL (ref 0.05–1.2)
MONOCYTES NFR BLD: 3 % (ref 3–10)
NEUTS SEG # BLD: 14.1 K/UL (ref 1.8–8)
NEUTS SEG NFR BLD: 93 % (ref 40–73)
PLATELET # BLD AUTO: 331 K/UL (ref 135–420)
PMV BLD AUTO: 10.5 FL (ref 9.2–11.8)
POTASSIUM SERPL-SCNC: 3.8 MMOL/L (ref 3.5–5.5)
PROT SERPL-MCNC: 7.9 G/DL (ref 6.4–8.2)
PROTHROMBIN TIME: 12.1 SEC (ref 11.5–15.2)
RBC # BLD AUTO: 5.17 M/UL (ref 4.7–5.5)
SODIUM SERPL-SCNC: 142 MMOL/L (ref 136–145)
WBC # BLD AUTO: 15.1 K/UL (ref 4.6–13.2)

## 2019-04-04 PROCEDURE — 74011636320 HC RX REV CODE- 636/320: Performed by: EMERGENCY MEDICINE

## 2019-04-04 PROCEDURE — 77030008477 HC STYL SATN SLP COVD -A: Performed by: ANESTHESIOLOGY

## 2019-04-04 PROCEDURE — 87076 CULTURE ANAEROBE IDENT EACH: CPT

## 2019-04-04 PROCEDURE — 77030008462 HC STPLR SKN PROX J&J -A: Performed by: SURGERY

## 2019-04-04 PROCEDURE — 87186 SC STD MICRODIL/AGAR DIL: CPT

## 2019-04-04 PROCEDURE — 74011250636 HC RX REV CODE- 250/636: Performed by: SURGERY

## 2019-04-04 PROCEDURE — 0D9670Z DRAINAGE OF STOMACH WITH DRAINAGE DEVICE, VIA NATURAL OR ARTIFICIAL OPENING: ICD-10-PCS | Performed by: SURGERY

## 2019-04-04 PROCEDURE — 76210000016 HC OR PH I REC 1 TO 1.5 HR: Performed by: SURGERY

## 2019-04-04 PROCEDURE — 77030031139 HC SUT VCRL2 J&J -A: Performed by: SURGERY

## 2019-04-04 PROCEDURE — 87040 BLOOD CULTURE FOR BACTERIA: CPT

## 2019-04-04 PROCEDURE — 74011250636 HC RX REV CODE- 250/636: Performed by: EMERGENCY MEDICINE

## 2019-04-04 PROCEDURE — 87077 CULTURE AEROBIC IDENTIFY: CPT

## 2019-04-04 PROCEDURE — 74011000258 HC RX REV CODE- 258: Performed by: SURGERY

## 2019-04-04 PROCEDURE — 77030018836 HC SOL IRR NACL ICUM -A

## 2019-04-04 PROCEDURE — 74011250636 HC RX REV CODE- 250/636

## 2019-04-04 PROCEDURE — 87185 SC STD ENZYME DETCJ PER NZM: CPT

## 2019-04-04 PROCEDURE — 71045 X-RAY EXAM CHEST 1 VIEW: CPT

## 2019-04-04 PROCEDURE — 0DQN0ZZ REPAIR SIGMOID COLON, OPEN APPROACH: ICD-10-PCS | Performed by: SURGERY

## 2019-04-04 PROCEDURE — 77010033678 HC OXYGEN DAILY

## 2019-04-04 PROCEDURE — 77030010516 HC APPL HEMA CLP TELE -B: Performed by: SURGERY

## 2019-04-04 PROCEDURE — 96375 TX/PRO/DX INJ NEW DRUG ADDON: CPT

## 2019-04-04 PROCEDURE — 77030011266 HC ELECTRD BLD INSL COVD -A: Performed by: SURGERY

## 2019-04-04 PROCEDURE — 96365 THER/PROPH/DIAG IV INF INIT: CPT

## 2019-04-04 PROCEDURE — 77030003028 HC SUT VCRL J&J -A: Performed by: SURGERY

## 2019-04-04 PROCEDURE — 85730 THROMBOPLASTIN TIME PARTIAL: CPT

## 2019-04-04 PROCEDURE — 83690 ASSAY OF LIPASE: CPT

## 2019-04-04 PROCEDURE — 77030013189 HC SLV COMPR SCD HUNT -B: Performed by: SURGERY

## 2019-04-04 PROCEDURE — 85025 COMPLETE CBC W/AUTO DIFF WBC: CPT

## 2019-04-04 PROCEDURE — 74011000250 HC RX REV CODE- 250

## 2019-04-04 PROCEDURE — 77030018702 HC CLP LIG TI TELE -A: Performed by: SURGERY

## 2019-04-04 PROCEDURE — 87075 CULTR BACTERIA EXCEPT BLOOD: CPT

## 2019-04-04 PROCEDURE — 76010000161 HC OR TIME 1 TO 1.5 HR INTENSV-TIER 1: Performed by: SURGERY

## 2019-04-04 PROCEDURE — 77030005518 HC CATH URETH FOL 2W BARD -B: Performed by: SURGERY

## 2019-04-04 PROCEDURE — 74011000250 HC RX REV CODE- 250: Performed by: SURGERY

## 2019-04-04 PROCEDURE — 93005 ELECTROCARDIOGRAM TRACING: CPT

## 2019-04-04 PROCEDURE — 74011000272 HC RX REV CODE- 272: Performed by: SURGERY

## 2019-04-04 PROCEDURE — 96376 TX/PRO/DX INJ SAME DRUG ADON: CPT

## 2019-04-04 PROCEDURE — 74177 CT ABD & PELVIS W/CONTRAST: CPT

## 2019-04-04 PROCEDURE — 99285 EMERGENCY DEPT VISIT HI MDM: CPT

## 2019-04-04 PROCEDURE — 82962 GLUCOSE BLOOD TEST: CPT

## 2019-04-04 PROCEDURE — 77030013079 HC BLNKT BAIR HGGR 3M -A: Performed by: ANESTHESIOLOGY

## 2019-04-04 PROCEDURE — 96361 HYDRATE IV INFUSION ADD-ON: CPT

## 2019-04-04 PROCEDURE — 85610 PROTHROMBIN TIME: CPT

## 2019-04-04 PROCEDURE — 74011000258 HC RX REV CODE- 258: Performed by: EMERGENCY MEDICINE

## 2019-04-04 PROCEDURE — 77030021678 HC GLIDESCP STAT DISP VERT -B: Performed by: ANESTHESIOLOGY

## 2019-04-04 PROCEDURE — 76060000033 HC ANESTHESIA 1 TO 1.5 HR: Performed by: SURGERY

## 2019-04-04 PROCEDURE — 77030027138 HC INCENT SPIROMETER -A

## 2019-04-04 PROCEDURE — 65270000029 HC RM PRIVATE

## 2019-04-04 PROCEDURE — 77030002996 HC SUT SLK J&J -A: Performed by: SURGERY

## 2019-04-04 PROCEDURE — 77030018836 HC SOL IRR NACL ICUM -A: Performed by: SURGERY

## 2019-04-04 PROCEDURE — 77030008771 HC TU NG SALEM SUMP -A: Performed by: ANESTHESIOLOGY

## 2019-04-04 PROCEDURE — 80053 COMPREHEN METABOLIC PANEL: CPT

## 2019-04-04 PROCEDURE — 83605 ASSAY OF LACTIC ACID: CPT

## 2019-04-04 PROCEDURE — 87070 CULTURE OTHR SPECIMN AEROBIC: CPT

## 2019-04-04 PROCEDURE — 77030008683 HC TU ET CUF COVD -A: Performed by: ANESTHESIOLOGY

## 2019-04-04 PROCEDURE — 77030018846 HC SOL IRR STRL H20 ICUM -A: Performed by: SURGERY

## 2019-04-04 RX ORDER — SODIUM CHLORIDE, SODIUM LACTATE, POTASSIUM CHLORIDE, CALCIUM CHLORIDE 600; 310; 30; 20 MG/100ML; MG/100ML; MG/100ML; MG/100ML
INJECTION, SOLUTION INTRAVENOUS
Status: DISCONTINUED | OUTPATIENT
Start: 2019-04-04 | End: 2019-04-04 | Stop reason: HOSPADM

## 2019-04-04 RX ORDER — GLYCOPYRROLATE 0.2 MG/ML
INJECTION INTRAMUSCULAR; INTRAVENOUS AS NEEDED
Status: DISCONTINUED | OUTPATIENT
Start: 2019-04-04 | End: 2019-04-04 | Stop reason: HOSPADM

## 2019-04-04 RX ORDER — ESMOLOL HYDROCHLORIDE 10 MG/ML
INJECTION INTRAVENOUS AS NEEDED
Status: DISCONTINUED | OUTPATIENT
Start: 2019-04-04 | End: 2019-04-04 | Stop reason: HOSPADM

## 2019-04-04 RX ORDER — MORPHINE SULFATE 4 MG/ML
4 INJECTION INTRAVENOUS
Status: COMPLETED | OUTPATIENT
Start: 2019-04-04 | End: 2019-04-04

## 2019-04-04 RX ORDER — SODIUM CHLORIDE, SODIUM LACTATE, POTASSIUM CHLORIDE, CALCIUM CHLORIDE 600; 310; 30; 20 MG/100ML; MG/100ML; MG/100ML; MG/100ML
125 INJECTION, SOLUTION INTRAVENOUS CONTINUOUS
Status: DISCONTINUED | OUTPATIENT
Start: 2019-04-04 | End: 2019-04-04 | Stop reason: HOSPADM

## 2019-04-04 RX ORDER — MAGNESIUM SULFATE 100 %
4 CRYSTALS MISCELLANEOUS AS NEEDED
Status: DISCONTINUED | OUTPATIENT
Start: 2019-04-04 | End: 2019-04-04 | Stop reason: HOSPADM

## 2019-04-04 RX ORDER — FENTANYL CITRATE 50 UG/ML
INJECTION, SOLUTION INTRAMUSCULAR; INTRAVENOUS AS NEEDED
Status: DISCONTINUED | OUTPATIENT
Start: 2019-04-04 | End: 2019-04-04 | Stop reason: HOSPADM

## 2019-04-04 RX ORDER — HYDROMORPHONE HYDROCHLORIDE 1 MG/ML
INJECTION, SOLUTION INTRAMUSCULAR; INTRAVENOUS; SUBCUTANEOUS AS NEEDED
Status: DISCONTINUED | OUTPATIENT
Start: 2019-04-04 | End: 2019-04-04 | Stop reason: HOSPADM

## 2019-04-04 RX ORDER — ENOXAPARIN SODIUM 100 MG/ML
40 INJECTION SUBCUTANEOUS EVERY 24 HOURS
Status: DISCONTINUED | OUTPATIENT
Start: 2019-04-04 | End: 2019-04-09 | Stop reason: HOSPADM

## 2019-04-04 RX ORDER — HYDROMORPHONE HYDROCHLORIDE 1 MG/ML
0.5 INJECTION, SOLUTION INTRAMUSCULAR; INTRAVENOUS; SUBCUTANEOUS
Status: DISCONTINUED | OUTPATIENT
Start: 2019-04-04 | End: 2019-04-05

## 2019-04-04 RX ORDER — PROPOFOL 10 MG/ML
INJECTION, EMULSION INTRAVENOUS AS NEEDED
Status: DISCONTINUED | OUTPATIENT
Start: 2019-04-04 | End: 2019-04-04 | Stop reason: HOSPADM

## 2019-04-04 RX ORDER — BUPIVACAINE HYDROCHLORIDE AND EPINEPHRINE 2.5; 5 MG/ML; UG/ML
INJECTION, SOLUTION EPIDURAL; INFILTRATION; INTRACAUDAL; PERINEURAL AS NEEDED
Status: DISCONTINUED | OUTPATIENT
Start: 2019-04-04 | End: 2019-04-04 | Stop reason: HOSPADM

## 2019-04-04 RX ORDER — NEOSTIGMINE METHYLSULFATE 5 MG/5 ML
SYRINGE (ML) INTRAVENOUS AS NEEDED
Status: DISCONTINUED | OUTPATIENT
Start: 2019-04-04 | End: 2019-04-04 | Stop reason: HOSPADM

## 2019-04-04 RX ORDER — FENTANYL CITRATE 50 UG/ML
50 INJECTION, SOLUTION INTRAMUSCULAR; INTRAVENOUS AS NEEDED
Status: DISCONTINUED | OUTPATIENT
Start: 2019-04-04 | End: 2019-04-04 | Stop reason: HOSPADM

## 2019-04-04 RX ORDER — KETOROLAC TROMETHAMINE 30 MG/ML
INJECTION, SOLUTION INTRAMUSCULAR; INTRAVENOUS AS NEEDED
Status: DISCONTINUED | OUTPATIENT
Start: 2019-04-04 | End: 2019-04-04 | Stop reason: HOSPADM

## 2019-04-04 RX ORDER — ONDANSETRON 2 MG/ML
4 INJECTION INTRAMUSCULAR; INTRAVENOUS
Status: COMPLETED | OUTPATIENT
Start: 2019-04-04 | End: 2019-04-04

## 2019-04-04 RX ORDER — VECURONIUM BROMIDE FOR INJECTION 1 MG/ML
INJECTION, POWDER, LYOPHILIZED, FOR SOLUTION INTRAVENOUS AS NEEDED
Status: DISCONTINUED | OUTPATIENT
Start: 2019-04-04 | End: 2019-04-04 | Stop reason: HOSPADM

## 2019-04-04 RX ORDER — INSULIN LISPRO 100 [IU]/ML
INJECTION, SOLUTION INTRAVENOUS; SUBCUTANEOUS ONCE
Status: DISCONTINUED | OUTPATIENT
Start: 2019-04-04 | End: 2019-04-04 | Stop reason: HOSPADM

## 2019-04-04 RX ORDER — HYDROMORPHONE HYDROCHLORIDE 2 MG/ML
0.5 INJECTION, SOLUTION INTRAMUSCULAR; INTRAVENOUS; SUBCUTANEOUS
Status: DISCONTINUED | OUTPATIENT
Start: 2019-04-04 | End: 2019-04-04 | Stop reason: HOSPADM

## 2019-04-04 RX ORDER — DEXTROSE MONOHYDRATE 25 G/50ML
25-50 INJECTION, SOLUTION INTRAVENOUS AS NEEDED
Status: DISCONTINUED | OUTPATIENT
Start: 2019-04-04 | End: 2019-04-04 | Stop reason: HOSPADM

## 2019-04-04 RX ORDER — ONDANSETRON 2 MG/ML
4 INJECTION INTRAMUSCULAR; INTRAVENOUS ONCE
Status: DISCONTINUED | OUTPATIENT
Start: 2019-04-04 | End: 2019-04-04 | Stop reason: HOSPADM

## 2019-04-04 RX ORDER — SUCCINYLCHOLINE CHLORIDE 20 MG/ML
INJECTION INTRAMUSCULAR; INTRAVENOUS AS NEEDED
Status: DISCONTINUED | OUTPATIENT
Start: 2019-04-04 | End: 2019-04-04 | Stop reason: HOSPADM

## 2019-04-04 RX ORDER — LIDOCAINE HYDROCHLORIDE 20 MG/ML
INJECTION, SOLUTION EPIDURAL; INFILTRATION; INTRACAUDAL; PERINEURAL AS NEEDED
Status: DISCONTINUED | OUTPATIENT
Start: 2019-04-04 | End: 2019-04-04 | Stop reason: HOSPADM

## 2019-04-04 RX ORDER — DEXTROSE, SODIUM CHLORIDE, AND POTASSIUM CHLORIDE 5; .45; .15 G/100ML; G/100ML; G/100ML
50 INJECTION INTRAVENOUS CONTINUOUS
Status: DISCONTINUED | OUTPATIENT
Start: 2019-04-04 | End: 2019-04-09 | Stop reason: HOSPADM

## 2019-04-04 RX ADMIN — PROPOFOL 150 MG: 10 INJECTION, EMULSION INTRAVENOUS at 13:18

## 2019-04-04 RX ADMIN — HYDROMORPHONE HYDROCHLORIDE 1 MG: 1 INJECTION, SOLUTION INTRAMUSCULAR; INTRAVENOUS; SUBCUTANEOUS at 13:42

## 2019-04-04 RX ADMIN — SODIUM CHLORIDE 1000 ML: 900 INJECTION, SOLUTION INTRAVENOUS at 11:32

## 2019-04-04 RX ADMIN — ENOXAPARIN SODIUM 40 MG: 40 INJECTION SUBCUTANEOUS at 17:37

## 2019-04-04 RX ADMIN — PIPERACILLIN SODIUM,TAZOBACTAM SODIUM 3.38 G: 3; .375 INJECTION, POWDER, FOR SOLUTION INTRAVENOUS at 11:49

## 2019-04-04 RX ADMIN — LIDOCAINE HYDROCHLORIDE 60 MG: 20 INJECTION, SOLUTION EPIDURAL; INFILTRATION; INTRACAUDAL; PERINEURAL at 13:18

## 2019-04-04 RX ADMIN — SODIUM CHLORIDE, SODIUM LACTATE, POTASSIUM CHLORIDE, CALCIUM CHLORIDE: 600; 310; 30; 20 INJECTION, SOLUTION INTRAVENOUS at 13:10

## 2019-04-04 RX ADMIN — VECURONIUM BROMIDE FOR INJECTION 4 MG: 1 INJECTION, POWDER, LYOPHILIZED, FOR SOLUTION INTRAVENOUS at 13:38

## 2019-04-04 RX ADMIN — SUCCINYLCHOLINE CHLORIDE 100 MG: 20 INJECTION INTRAMUSCULAR; INTRAVENOUS at 13:18

## 2019-04-04 RX ADMIN — ESMOLOL HYDROCHLORIDE 10 MG: 10 INJECTION INTRAVENOUS at 13:33

## 2019-04-04 RX ADMIN — MORPHINE SULFATE 4 MG: 4 INJECTION INTRAVENOUS at 11:48

## 2019-04-04 RX ADMIN — HYDROMORPHONE HYDROCHLORIDE 0.5 MG: 1 INJECTION, SOLUTION INTRAMUSCULAR; INTRAVENOUS; SUBCUTANEOUS at 23:42

## 2019-04-04 RX ADMIN — ESMOLOL HYDROCHLORIDE 10 MG: 10 INJECTION INTRAVENOUS at 13:25

## 2019-04-04 RX ADMIN — MORPHINE SULFATE 4 MG: 4 INJECTION INTRAVENOUS at 09:32

## 2019-04-04 RX ADMIN — PIPERACILLIN SODIUM,TAZOBACTAM SODIUM 3.38 G: 3; .375 INJECTION, POWDER, FOR SOLUTION INTRAVENOUS at 23:43

## 2019-04-04 RX ADMIN — PIPERACILLIN SODIUM,TAZOBACTAM SODIUM 3.38 G: 3; .375 INJECTION, POWDER, FOR SOLUTION INTRAVENOUS at 17:01

## 2019-04-04 RX ADMIN — SODIUM CHLORIDE 1000 ML: 900 INJECTION, SOLUTION INTRAVENOUS at 09:31

## 2019-04-04 RX ADMIN — FENTANYL CITRATE 100 MCG: 50 INJECTION, SOLUTION INTRAMUSCULAR; INTRAVENOUS at 13:18

## 2019-04-04 RX ADMIN — SODIUM CHLORIDE, SODIUM LACTATE, POTASSIUM CHLORIDE, CALCIUM CHLORIDE: 600; 310; 30; 20 INJECTION, SOLUTION INTRAVENOUS at 13:20

## 2019-04-04 RX ADMIN — ONDANSETRON 4 MG: 2 INJECTION INTRAMUSCULAR; INTRAVENOUS at 09:32

## 2019-04-04 RX ADMIN — KETOROLAC TROMETHAMINE 15 MG: 30 INJECTION, SOLUTION INTRAMUSCULAR; INTRAVENOUS at 14:29

## 2019-04-04 RX ADMIN — GLYCOPYRROLATE 0.4 MG: 0.2 INJECTION INTRAMUSCULAR; INTRAVENOUS at 14:24

## 2019-04-04 RX ADMIN — DEXTROSE MONOHYDRATE, SODIUM CHLORIDE, AND POTASSIUM CHLORIDE 125 ML/HR: 50; 4.5; 1.49 INJECTION, SOLUTION INTRAVENOUS at 17:00

## 2019-04-04 RX ADMIN — IOPAMIDOL 95 ML: 612 INJECTION, SOLUTION INTRAVENOUS at 10:17

## 2019-04-04 RX ADMIN — VECURONIUM BROMIDE FOR INJECTION 1 MG: 1 INJECTION, POWDER, LYOPHILIZED, FOR SOLUTION INTRAVENOUS at 13:18

## 2019-04-04 RX ADMIN — Medication 5 MG: at 14:24

## 2019-04-04 NOTE — ED NOTES
Report provided to KENN Coulter. Patient is now leaving for CT. Remains at baseline and in no acute distress. Denies intolerable pain.

## 2019-04-04 NOTE — ED TRIAGE NOTES
Patient endured a syncopal episode when walking after days of nausea and vomiting. Ambulates with a cane.
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
## 2019-04-04 NOTE — BRIEF OP NOTE
BRIEF OPERATIVE NOTE Date of Procedure: 4/4/2019 Preoperative Diagnosis: perforated viscus Postoperative Diagnosis: perforated sigmoid diverticulum Procedure(s):Exploratory laparotomy, oversew perforated sigmoid diverticulum LAPAROTOMY EXPLORATORY Surgeon(s) and Role: Carri Desai MD - Primary Surgical Assistant:  
 
Surgical Staff: 
Circ-1: Miah Patiño RN 
Circ-2: Carri Desai Scrub Tech-1: [facility] Surg Asst-1: Lewie Ganser Event Time In Time Out Incision Start [phone] Incision Close 1430 Anesthesia: General  
Estimated Blood Loss: 100 cc Specimens:  
ID Type Source Tests Collected by Time Destination 1 : Peritoneal Fluid Body Fluid Peritoneal Fluid CULTURE, ANAEROBIC Carolina López MD 4/4/2019 1351 Microbiology Findings: perforated sigmoid diverticulum with minimal contamination Complications: none Implants: * No implants in log *
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
## 2019-04-04 NOTE — PERIOP NOTES
Recd care of pt from OR via bed. Resp even and unlabored. Attached to monitor. VSS. OR, MAR and anesthesia report acknowledged. NGT noted right nare. Attached to [address]. Carter patent. Will cont to monitor. 7018  TRANSFER - OUT REPORT: 
 
Verbal report given to KENN Campbell (name) on Ed Cantrell  being transferred to 2200 (unit) for routine post - op Report consisted of patients Situation, Background, Assessment and  
Recommendations(SBAR). Information from the following report(s) SBAR, Kardex, OR Summary, Recent Results and Cardiac Rhythm sinus rhythm was reviewed with the receiving nurse. Lines:  
Peripheral IV 04/04/19 Right Antecubital (Active) Site Assessment Clean, dry, & intact 4/4/2019  1:03 PM  
Phlebitis Assessment 0 4/4/2019  1:03 PM  
Infiltration Assessment 0 4/4/2019  1:03 PM  
Dressing Status Clean, dry, & intact 4/4/2019  1:03 PM  
Dressing Type Transparent;Tape 4/4/2019  1:03 PM  
Hub Color/Line Status Green; Infusing 4/4/2019  1:03 PM  
Action Taken Blood drawn 4/4/2019  8:39 AM  
  
 
Opportunity for questions and clarification was provided. Patient transported with: 
 Registered Nurse Tech

## 2019-04-04 NOTE — PROGRESS NOTES
1550 received from PACU, hughes, o2, ngt to low continuous suction Dressing has small amount of sangunous drainage  in to see patient 
ngt irrigated with 30cc ns Ice chips per doctor aware of breakthru bleeding on dressing 1910 dressing marked of area that bled, has not increased since patient arrived

## 2019-04-04 NOTE — ANESTHESIA PREPROCEDURE EVALUATION
Relevant Problems No relevant active problems Anesthetic History No history of anesthetic complications Review of Systems / Medical History Patient summary reviewed and pertinent labs reviewed Pulmonary Within defined limits Neuro/Psych Within defined limits Cardiovascular Hypertension CAD Exercise tolerance: >4 METS 
  
GI/Hepatic/Renal 
Within defined limits Endo/Other Within defined limits Diabetes: type 2, using insulin Other Findings Physical Exam 
 
Airway Mallampati: II 
TM Distance: 4 - 6 cm Neck ROM: normal range of motion Mouth opening: Normal 
 
 Cardiovascular Regular rate and rhythm,  S1 and S2 normal,  no murmur, click, rub, or gallop Rhythm: regular Rate: normal 
 
 
 
 Dental 
 
Dentition: Lower dentition intact and Upper dentition intact Pulmonary Breath sounds clear to auscultation Abdominal 
GI exam deferred Other Findings Anesthetic Plan ASA: 3, emergent Anesthesia type: general 
 
 
 
 
Induction: Intravenous Anesthetic plan and risks discussed with: Patient

## 2019-04-04 NOTE — PERIOP NOTES
Pre-Op Summary Pt arrived via car with family/friend and is oriented to time, place, person and situation. Patient with steady gait with cane assistive devices. Visit Vitals /67 Pulse 86 Temp 98.2 °F (36.8 °C) Resp 20 Ht 5' 6.5\" (1.689 m) Wt 61.2 kg (135 lb) SpO2 93% BMI 21.46 kg/m² Peripheral IV located on Right antecubital . Patients belongings are located in closet. Patient's point of contact is René Dinh their contact number is: [phone]. They will be leaving and coming back. They are able to receive medication information. They will be their ride home.

## 2019-04-04 NOTE — PROGRESS NOTES
Patient alert, good pain control Afebrile, VSS Abdomen soft flat 
Dsg intact with small amt bloody drainage NG minimal and appears patent Doing remarkably well thus far.

## 2019-04-04 NOTE — PROGRESS NOTES
TRANSFER - IN REPORT: 
 
Verbal report received from [facility] on Jose Perez  being received from PACU for routine post - op Report consisted of patients Situation, Background, Assessment and  
Recommendations(SBAR). Information from the following report(s) SBAR, Procedure Summary and MAR was reviewed with the receiving nurse. Opportunity for questions and clarification was provided. 1605 Pt received via bed awake and drowsy, in NAD. Dressing to abdomen with approx 4in breakthrough bleeding. Carter draining clear yellow urine, O2 at 2L applied via n/c, wearing SCD's. NGT attached to suction by PACU RN. Oriented to call bell, phone and IS with pt giving return demonstration.

## 2019-04-04 NOTE — PROGRESS NOTES
Reason for Renal Dosing:  Per Renal Dosing Policy Patient clinical status and labs ordered/reviewed. Pt Weight Weight: 61.2 kg (135 lb) Serum Creatinine Lab Results Component Value Date/Time Creatinine 1.23 04/04/2019 08:37 AM  
 Creatinine, POC 0.9 09/18/2014 11:13 AM  
 
   
Creatinine Clearance Estimated Creatinine Clearance: 39.4 mL/min (based on SCr of 1.23 mg/dL). BUN Lab Results Component Value Date/Time BUN 24 (H) 04/04/2019 08:37 AM  
 
   
WBC Lab Results Component Value Date/Time WBC 15.1 (H) 04/04/2019 08:37 AM  
 
  
Temperature Temp: 97.5 °F (36.4 °C) HR Pulse (Heart Rate): 98 
  
BP BP: 137/56 Drug type: Non-Antibiotic Drug/dose: Enoxaparin 30 mg every 24 hours was adjusted to Enoxaparin 40 mg every 24 hours Continue to monitor. Signed Ashely Guthrie PHARMD 
Date 4/4/2019 Time 4:00 PM

## 2019-04-05 LAB
ALBUMIN SERPL-MCNC: 2.4 G/DL (ref 3.4–5)
ALBUMIN/GLOB SERPL: 1 {RATIO} (ref 0.8–1.7)
ALP SERPL-CCNC: 53 U/L (ref 45–117)
ALT SERPL-CCNC: 14 U/L (ref 16–61)
ANION GAP SERPL CALC-SCNC: 4 MMOL/L (ref 3–18)
AST SERPL-CCNC: 19 U/L (ref 15–37)
ATRIAL RATE: 82 BPM
BASOPHILS # BLD: 0 K/UL (ref 0–0.1)
BASOPHILS NFR BLD: 0 % (ref 0–2)
BILIRUB SERPL-MCNC: 0.8 MG/DL (ref 0.2–1)
BUN SERPL-MCNC: 20 MG/DL (ref 7–18)
BUN/CREAT SERPL: 19 (ref 12–20)
CALCIUM SERPL-MCNC: 7.4 MG/DL (ref 8.5–10.1)
CALCULATED P AXIS, ECG09: 68 DEGREES
CALCULATED R AXIS, ECG10: 69 DEGREES
CALCULATED T AXIS, ECG11: 39 DEGREES
CHLORIDE SERPL-SCNC: 114 MMOL/L (ref 100–108)
CO2 SERPL-SCNC: 25 MMOL/L (ref 21–32)
CREAT SERPL-MCNC: 1.03 MG/DL (ref 0.6–1.3)
DIAGNOSIS, 93000: NORMAL
DIFFERENTIAL METHOD BLD: ABNORMAL
EOSINOPHIL # BLD: 0 K/UL (ref 0–0.4)
EOSINOPHIL NFR BLD: 0 % (ref 0–5)
ERYTHROCYTE [DISTWIDTH] IN BLOOD BY AUTOMATED COUNT: 14.1 % (ref 11.6–14.5)
GLOBULIN SER CALC-MCNC: 2.5 G/DL (ref 2–4)
GLUCOSE SERPL-MCNC: 105 MG/DL (ref 74–99)
HCT VFR BLD AUTO: 38.8 % (ref 36–48)
HGB BLD-MCNC: 12.5 G/DL (ref 13–16)
LYMPHOCYTES # BLD: 0.6 K/UL (ref 0.9–3.6)
LYMPHOCYTES NFR BLD: 6 % (ref 21–52)
MCH RBC QN AUTO: 32.1 PG (ref 24–34)
MCHC RBC AUTO-ENTMCNC: 32.2 G/DL (ref 31–37)
MCV RBC AUTO: 99.7 FL (ref 74–97)
MONOCYTES # BLD: 0.5 K/UL (ref 0.05–1.2)
MONOCYTES NFR BLD: 4 % (ref 3–10)
NEUTS SEG # BLD: 9.7 K/UL (ref 1.8–8)
NEUTS SEG NFR BLD: 90 % (ref 40–73)
P-R INTERVAL, ECG05: 146 MS
PLATELET # BLD AUTO: 247 K/UL (ref 135–420)
PMV BLD AUTO: 9.8 FL (ref 9.2–11.8)
POTASSIUM SERPL-SCNC: 3.9 MMOL/L (ref 3.5–5.5)
PROT SERPL-MCNC: 4.9 G/DL (ref 6.4–8.2)
Q-T INTERVAL, ECG07: 360 MS
QRS DURATION, ECG06: 78 MS
QTC CALCULATION (BEZET), ECG08: 420 MS
RBC # BLD AUTO: 3.89 M/UL (ref 4.7–5.5)
SODIUM SERPL-SCNC: 143 MMOL/L (ref 136–145)
VENTRICULAR RATE, ECG03: 82 BPM
WBC # BLD AUTO: 10.8 K/UL (ref 4.6–13.2)

## 2019-04-05 PROCEDURE — 74011250636 HC RX REV CODE- 250/636: Performed by: SURGERY

## 2019-04-05 PROCEDURE — 36415 COLL VENOUS BLD VENIPUNCTURE: CPT

## 2019-04-05 PROCEDURE — 65270000029 HC RM PRIVATE

## 2019-04-05 PROCEDURE — 74011000258 HC RX REV CODE- 258: Performed by: SURGERY

## 2019-04-05 PROCEDURE — 85025 COMPLETE CBC W/AUTO DIFF WBC: CPT

## 2019-04-05 PROCEDURE — 77010033678 HC OXYGEN DAILY

## 2019-04-05 PROCEDURE — 80053 COMPREHEN METABOLIC PANEL: CPT

## 2019-04-05 RX ORDER — HYDROMORPHONE HYDROCHLORIDE 1 MG/ML
1 INJECTION, SOLUTION INTRAMUSCULAR; INTRAVENOUS; SUBCUTANEOUS
Status: DISCONTINUED | OUTPATIENT
Start: 2019-04-05 | End: 2019-04-09 | Stop reason: HOSPADM

## 2019-04-05 RX ORDER — ONDANSETRON 2 MG/ML
4 INJECTION INTRAMUSCULAR; INTRAVENOUS
Status: DISCONTINUED | OUTPATIENT
Start: 2019-04-05 | End: 2019-04-09 | Stop reason: HOSPADM

## 2019-04-05 RX ORDER — KETOROLAC TROMETHAMINE 30 MG/ML
15 INJECTION, SOLUTION INTRAMUSCULAR; INTRAVENOUS EVERY 6 HOURS
Status: DISCONTINUED | OUTPATIENT
Start: 2019-04-05 | End: 2019-04-09 | Stop reason: HOSPADM

## 2019-04-05 RX ADMIN — HYDROMORPHONE HYDROCHLORIDE 0.5 MG: 1 INJECTION, SOLUTION INTRAMUSCULAR; INTRAVENOUS; SUBCUTANEOUS at 06:14

## 2019-04-05 RX ADMIN — PIPERACILLIN SODIUM,TAZOBACTAM SODIUM 3.38 G: 3; .375 INJECTION, POWDER, FOR SOLUTION INTRAVENOUS at 16:49

## 2019-04-05 RX ADMIN — PIPERACILLIN SODIUM,TAZOBACTAM SODIUM 3.38 G: 3; .375 INJECTION, POWDER, FOR SOLUTION INTRAVENOUS at 08:32

## 2019-04-05 RX ADMIN — DEXTROSE MONOHYDRATE, SODIUM CHLORIDE, AND POTASSIUM CHLORIDE 125 ML/HR: 50; 4.5; 1.49 INJECTION, SOLUTION INTRAVENOUS at 06:15

## 2019-04-05 RX ADMIN — DEXTROSE MONOHYDRATE, SODIUM CHLORIDE, AND POTASSIUM CHLORIDE 125 ML/HR: 50; 4.5; 1.49 INJECTION, SOLUTION INTRAVENOUS at 13:23

## 2019-04-05 RX ADMIN — KETOROLAC TROMETHAMINE 15 MG: 30 INJECTION, SOLUTION INTRAMUSCULAR; INTRAVENOUS at 18:38

## 2019-04-05 RX ADMIN — HYDROMORPHONE HYDROCHLORIDE 0.5 MG: 1 INJECTION, SOLUTION INTRAMUSCULAR; INTRAVENOUS; SUBCUTANEOUS at 10:30

## 2019-04-05 RX ADMIN — HYDROMORPHONE HYDROCHLORIDE 0.5 MG: 1 INJECTION, SOLUTION INTRAMUSCULAR; INTRAVENOUS; SUBCUTANEOUS at 03:28

## 2019-04-05 RX ADMIN — ONDANSETRON 4 MG: 2 INJECTION INTRAMUSCULAR; INTRAVENOUS at 13:21

## 2019-04-05 RX ADMIN — DEXTROSE MONOHYDRATE, SODIUM CHLORIDE, AND POTASSIUM CHLORIDE 125 ML/HR: 50; 4.5; 1.49 INJECTION, SOLUTION INTRAVENOUS at 22:30

## 2019-04-05 RX ADMIN — ENOXAPARIN SODIUM 40 MG: 40 INJECTION SUBCUTANEOUS at 17:32

## 2019-04-05 RX ADMIN — HYDROMORPHONE HYDROCHLORIDE 0.5 MG: 1 INJECTION, SOLUTION INTRAMUSCULAR; INTRAVENOUS; SUBCUTANEOUS at 15:16

## 2019-04-05 NOTE — PROGRESS NOTES
Care Management Interventions PCP Verified by CM: Yes 
Palliative Care Criteria Met (RRAT>21 & CHF Dx)?: No 
Transition of Care Consult (CM Consult): Other(home) Current Support Network: Lives Alone(Fiance') Confirm Follow Up Transport: Family(Fiance') Plan discussed with Pt/Family/Caregiver: Yes Discharge Location Discharge Placement: Home Reason for Admission: Exploratory laparotomy, over so sigmoid diverticulum; irrigation of abdomen. RRAT Score:   25 Resources/supports as identified by patient/family:  None at this time Top Challenges facing patient (as identified by patient/family and CM): Finances/Medication cost?    [employer] complete Transportation? Patient's Fiance' Support system or lack thereof? Has support of friends and Neris Escort' Living arrangements? Lives alone Self-care/ADLs/Cognition? Alert and oriented x 4 Current Advanced Directive/Advance Care Plan: On file Plan for utilizing home health: If indicated Likelihood of readmission: red/high Transition of Care Plan:      
 
Spoke patient in room,  He stated that prior to admission he was independent with his ADL's and uses a cane at times. He verified his demographics, PCP, insurance, address, and phone # as correct on the face sheet. Patient has designated _____________Fiance'___________ to participate in his/her discharge plan and to receive any needed information. Carlos Santi [phone]. He plans to return home upon discharge and transportation will be provided by family.

## 2019-04-05 NOTE — PROGRESS NOTES
conducted an initial consultation and Spiritual Assessment for Amparo Espinal, who is a 81 y. o.,male. Patients Primary Language is: [place]. According to the patients EMR Lutheran Affiliation is: [facility].  
 
The reason the Patient came to the hospital is:  
Patient Active Problem List  
 Diagnosis Date Noted  Perforated diverticulum of large intestine 04/04/2019  Status post exploratory laparotomy 04/04/2019  Diabetes [address] [address] 02/21/2019  BPH (benign prostatic hyperplasia) 02/21/2019  Hypercholesterolemia 02/21/2019  Type 2 diabetes with nephropathy ([facility] [address] 02/21/2019  Elevated PSA 11/07/2017  Benign prostatic hyperplasia with lower urinary tract symptoms 10/26/2016  Erectile dysfunction 10/26/2016  Hematuria 09/11/2014 The  provided the following Interventions: 
 attempted to initiate a relationship of care and support with patient in room 2205. Found patient resting peacefully after multiple attempts to visit with patient. No family seen at either of the attempted visits. Provided information about Spiritual Care Services. Offered prayer and assurance of continued prayers on patients behalf. Assessment: 
Patient does not have any Congregational/cultural needs that will affect patients preferences in health care. There are no further spiritual or Congregational issues which require Spiritual Care Services interventions at this time. Plan: 
Chaplains will continue to follow and will provide pastoral care on an as needed/requested basis Isidro Ferguson 3 Board Certified [employer] Spiritual Care  
[phone]

## 2019-04-05 NOTE — DIABETES MGMT
NUTRITIONAL ASSESSMENT GLYCEMIC CONTROL/ PLAN OF CARE Kandace Armstrong           81 y.o.           4/4/2019 1. Pneumoperitoneum of unknown etiology 2. Leukocytosis, unspecified type T2DM INTERVENTIONS/PLAN:  
1. Suggest corrective lispro, normal insulin sensitivity q 6 hours. 2.  Monitor diet advancement and tolerance, labs and weights. ASSESSMENT:  
Nutritional Status:  Pt is 93% ideal wt; BMI (calculated): 21.5 kg/m2 (normal weight classification however pt at nutrition risk with BMI < 24.0 in pt > 65 years). Nutrition Diagnoses:  
Inadequate oral food and beverage intake due to s/p Exploratory laparotomy, oversew sigmoid diverticulum; irrigation of abdomen 4/4/19 with NGT to continuous low suction. Altered nutrition related labs due to diabetes as evidenced by A1C of 8.8% (2/24/19). Diabetes Management:  
Good glycemic control at this time. Recent blood glucose:    
4/5/19:  Lab - 105 
4/4/19:  Lab - 198;  POC - 84 Within target range (non-ICU: <140; ICU<180): [] Yes   []  No  Varied Current Insulin regimen:  
none Home medication/insulin regimen: pt confirmed: 
Levemir insulin, 10 units q night if BG < 170 mg/dL and up to 15 units q night if BG > 200 Actos 45 mg/d  
Jardiance, 25 mg/d HbA1c: 8.8% -ave BG has been ~ 204 mg/dL over past 3 months. Adequate glycemic control PTA:  [] Yes  [x] No 
  
 
SUBJECTIVE/OBJECTIVE: Information obtained from: chart review, pt Pt presented to ED with sudden onset of abdominal pain, N/V. He is s/p  exploratory laparotomy, oversew sigmoid diverticulum; irrigation of abdomen 4/4/19 with NGT to continuous low suction for perforated sigmoid diverticulum. 4/5/19:  Pt reports his weight was stable PTA. He does not have known food allergies and he denies issues with chewing or swallowing. Pt states he is feeling  Better. Diet: NPO (NGT to low continuous suction) No data found. Medications: [x]                Reviewed IVF:  D5 1/2 NS with 20 mEq KCl/L at 125 ml/hr (providing 510 calories/24 hours) Most Recent POC Glucose:  
Recent Labs 04/05/19 
0245 04/04/19 
4398 * 198* Labs:  
No results found for: HBA1C, HGBE8, RVF9YOPA Lab Results Component Value Date/Time Sodium 143 04/05/2019 02:45 AM  
 Potassium 3.9 04/05/2019 02:45 AM  
 Chloride 114 (H) 04/05/2019 02:45 AM  
 CO2 25 04/05/2019 02:45 AM  
 Anion gap 4 04/05/2019 02:45 AM  
 Glucose 105 (H) 04/05/2019 02:45 AM  
 BUN 20 (H) 04/05/2019 02:45 AM  
 Creatinine 1.03 04/05/2019 02:45 AM  
 Calcium 7.4 (L) 04/05/2019 02:45 AM  
 Albumin 2.4 (L) 04/05/2019 02:45 AM  
 
 
Anthropometrics: IBW : 65.8 kg (145 lb), % IBW (Calculated): 93.1 %, BMI (calculated): 21.5 Wt Readings from Last 1 Encounters:  
04/05/19 61.2 kg (135 lb) Ht Readings from Last 1 Encounters:  
04/05/19 5' 6.5\" (1.689 m) Estimated Nutrition Needs:  1887 Kcals/day, Protein (g): 92 g Fluid (ml): 1900 ml Based on:   [x]          Actual BW    []          ABW   []            Adjusted BW   
    
Nutrition Interventions: 
None at this time - NPO with NGT to low continuous suction Goal:  
Blood glucose will be within target range of  mg/dL by 4/8/19. Provision of adequate nutrition by 4/10/19. Weight maintenance (+/- 1-2 kg) or weight gain of 1-2 lbs/week by 4/15/19. Nutrition Monitoring and Evaluation []     Monitor po intake on meal rounds 
[x]     Continue inpatient monitoring and intervention 
[]     Other: 
 
 
Nutrition Risk:  [x]   High     []  Moderate    []  Minimal/Uncompromised Babak Lantigua RD, CDE Office:  [phone] [place] Pager:  [phone]

## 2019-04-05 NOTE — ANESTHESIA POSTPROCEDURE EVALUATION
Procedure(s): LAPAROTOMY EXPLORATORY. general 
 
Anesthesia Post Evaluation Multimodal analgesia: multimodal analgesia used between 6 hours prior to anesthesia start to PACU discharge Patient location during evaluation: bedside Patient participation: complete - patient participated Level of consciousness: awake Pain score: 0 Pain management: adequate Airway patency: patent Anesthetic complications: no 
Cardiovascular status: stable Respiratory status: acceptable Hydration status: acceptable Post anesthesia nausea and vomiting:  none Vitals Value Taken Time /56 4/4/2019  3:49 PM  
Temp 37.1 °C (98.8 °F) 4/4/2019  3:49 PM  
Pulse 91 4/4/2019  3:55 PM  
Resp 13 4/4/2019  3:55 PM  
SpO2 96 % 4/4/2019  3:56 PM

## 2019-04-05 NOTE — OP NOTES
[facility]  OPERATIVE REPORT    Name:  Rachel Spence  MR#:   [id number]  :  1934  ACCOUNT #:  [de-identified]  DATE OF SERVICE:  2019    PREOPERATIVE DIAGNOSIS:  Perforated viscus. POSTOPERATIVE DIAGNOSIS:  Perforated sigmoid diverticulum. PROCEDURE PERFORMED:  Exploratory laparotomy, over so sigmoid diverticulum; irrigation of abdomen. SURGEON: Quinten Mcleod MD      ANESTHESIA:  General endotracheal.    COMPLICATIONS:  none    SPECIMENS REMOVED:  Cultured peritoneal fluid. IMPLANTS:  none    ESTIMATED BLOOD LOSS: 100 mL. DRAINS:  None    PROCEDURE:  With the patient in supine position under satisfactory general anesthesia, a nasogastric tube was placed and a 14-Dutch Coude Carter catheter was placed. The abdomen was prepped and draped with sterile towels. A midline incision was made in the central abdomen. The subcutaneous tissue was divided with cautery as was the fascia in the midline. The peritoneum was grasped with hemostats, elevated, and sharply incised, with free air noted. There was no obvious peritoneal soilage. The upper abdomen was first explored. The gallbladder was distended but non-inflamed and the remainder of the upper abdomen including the stomach, duodenum, liver, and spleen were unremarkable. The small bowel was visibly normal, and there were adhesions from the omentum to the anterior abdominal wall on the left lower abdomen, at the site of a previous procedure. With manipulation of the small bowel out of the pelvis, there was leakage of cloudy purulent fluid and a sigmoid diverticulum was noted at the level of the sacral promontory with a central 5 mm perforation, but no surrounding purulence or inflammation of the bowel wall. The remainder of the sigmoid colon was unremarkable in appearance without additional visible diverticuli or inflammation.   The small bowel directly over that area of the sigmoid was slightly inflamed with a minimal reactive peel. The small sigmoid perforation was immediately controlled with direct pressure, cultures were taken from the pelvis. 3-0 silk sutures were used in a transverse fashion to invert the diverticulum and central perforation, with good closure achieved. The abdominal cavity was then copiously irrigated with warm saline and the return was clear. The colon was again inspected and the repair appeared satisfactory. The case was discussed with Dr. Ryann Abbasi intraoperatively. We both concurred that given the very minimal spillage present, and the normal appearance of the sigmoid colon at the site of repair, that it was acceptable not to perform proximal diversion. The abdomen was once more copiously irrigated. 0.25% Marcaine with epinephrine was infiltrated into the peritoneum and the subcutaneus tissue at the incision site for postoperative pain relief using a total of 36 mL. The fascia and peritoneum were then closed in a single layer using segmenst of continuous running 0 Vicryl. The subcutaneous tissue was irrigated. The skin was then loosely approximated using staples. Gauze and Tegaderm were applied. All counts were correct. The patient tolerated the procedure well and was taken to the PACU in good condition.         Morris Frazier MD [email or web address]  D:  04/04/2019 15:13  T:  04/04/2019 22:47  JOB #:  [id number]

## 2019-04-05 NOTE — PROGRESS NOTES
Bedside shift report received from Karen Mckeon Dr. With sbar Dr. Joes A Maldonado in changed dressinh 
ngt irrigated x1 
 dilaudid given for pain 
Zofran given for nausea Nausea improved 1525 dilaudid given for pain 1845 Toradol given Bedside shift report given to Karen Mckeon Dr. With sbar

## 2019-04-05 NOTE — PROGRESS NOTES
Bedside and Verbal shift change report given to Lizzie Up RN (oncoming nurse) by Domitila Metcalf RN (offgoing nurse). Report included the following information SBAR, Kardex, Intake/Output and MAR. Received laying in the bed with significant other at the bedside alert and verbal denied acute respiratory distress at this tome other than discomfort r/t surgical site. Call bell within reach will continue to monitor. 2342 Medicated for pain voiced no other complaint significant other remain at the bedside call bell within pt reach. 0040 Sleeping soundly NAD noted significant other remain at the bedside. 0330 Medicated for pain and NGT irrigated w/30 mL ns out put from NG very minimal so far.  
 
0355 Resting quietly f/u pain 6/10  
 
0615 Seams comfortable but rating pain 10/10 medicated as needed. 4008 f/u prn iv pain assessment resting comfortably stated \"hurt a little bit\" but continue rating \"7/10\" Bedside and Verbal shift change report given to Billy Verdugo (oncoming nurse) by Lizzie Up RN (offgoing nurse). Report included the following information SBAR, Kardex, Intake/Output, MAR and Recent Results.

## 2019-04-05 NOTE — PROGRESS NOTES
POD #1 Patient alert, irritable but with good pain control Afebrile, HR 85 Abdomen soft, flat Incision OK, dsg changed NG minimal, bilious OOB today

## 2019-04-06 LAB
ANION GAP SERPL CALC-SCNC: 7 MMOL/L (ref 3–18)
BASOPHILS # BLD: 0 K/UL (ref 0–0.1)
BASOPHILS NFR BLD: 0 % (ref 0–2)
BUN SERPL-MCNC: 14 MG/DL (ref 7–18)
BUN/CREAT SERPL: 14 (ref 12–20)
CALCIUM SERPL-MCNC: 7.6 MG/DL (ref 8.5–10.1)
CHLORIDE SERPL-SCNC: 109 MMOL/L (ref 100–108)
CO2 SERPL-SCNC: 24 MMOL/L (ref 21–32)
CREAT SERPL-MCNC: 0.99 MG/DL (ref 0.6–1.3)
DIFFERENTIAL METHOD BLD: ABNORMAL
EOSINOPHIL # BLD: 0 K/UL (ref 0–0.4)
EOSINOPHIL NFR BLD: 0 % (ref 0–5)
ERYTHROCYTE [DISTWIDTH] IN BLOOD BY AUTOMATED COUNT: 13.8 % (ref 11.6–14.5)
GLUCOSE SERPL-MCNC: 170 MG/DL (ref 74–99)
HCT VFR BLD AUTO: 38.8 % (ref 36–48)
HGB BLD-MCNC: 12.3 G/DL (ref 13–16)
LYMPHOCYTES # BLD: 0.4 K/UL (ref 0.9–3.6)
LYMPHOCYTES NFR BLD: 6 % (ref 21–52)
MCH RBC QN AUTO: 31.7 PG (ref 24–34)
MCHC RBC AUTO-ENTMCNC: 31.7 G/DL (ref 31–37)
MCV RBC AUTO: 100 FL (ref 74–97)
MONOCYTES # BLD: 0.4 K/UL (ref 0.05–1.2)
MONOCYTES NFR BLD: 6 % (ref 3–10)
NEUTS SEG # BLD: 6.1 K/UL (ref 1.8–8)
NEUTS SEG NFR BLD: 88 % (ref 40–73)
PLATELET # BLD AUTO: 249 K/UL (ref 135–420)
PMV BLD AUTO: 10.5 FL (ref 9.2–11.8)
POTASSIUM SERPL-SCNC: 4.3 MMOL/L (ref 3.5–5.5)
RBC # BLD AUTO: 3.88 M/UL (ref 4.7–5.5)
SODIUM SERPL-SCNC: 140 MMOL/L (ref 136–145)
WBC # BLD AUTO: 6.9 K/UL (ref 4.6–13.2)

## 2019-04-06 PROCEDURE — 80048 BASIC METABOLIC PNL TOTAL CA: CPT

## 2019-04-06 PROCEDURE — 74011250636 HC RX REV CODE- 250/636: Performed by: SURGERY

## 2019-04-06 PROCEDURE — 36415 COLL VENOUS BLD VENIPUNCTURE: CPT

## 2019-04-06 PROCEDURE — 77010033678 HC OXYGEN DAILY

## 2019-04-06 PROCEDURE — 74011000258 HC RX REV CODE- 258: Performed by: SURGERY

## 2019-04-06 PROCEDURE — 85025 COMPLETE CBC W/AUTO DIFF WBC: CPT

## 2019-04-06 PROCEDURE — 65270000029 HC RM PRIVATE

## 2019-04-06 RX ADMIN — KETOROLAC TROMETHAMINE 15 MG: 30 INJECTION, SOLUTION INTRAMUSCULAR; INTRAVENOUS at 00:29

## 2019-04-06 RX ADMIN — PIPERACILLIN SODIUM,TAZOBACTAM SODIUM 3.38 G: 3; .375 INJECTION, POWDER, FOR SOLUTION INTRAVENOUS at 00:27

## 2019-04-06 RX ADMIN — DEXTROSE MONOHYDRATE, SODIUM CHLORIDE, AND POTASSIUM CHLORIDE 75 ML/HR: 50; 4.5; 1.49 INJECTION, SOLUTION INTRAVENOUS at 05:37

## 2019-04-06 RX ADMIN — PIPERACILLIN SODIUM,TAZOBACTAM SODIUM 3.38 G: 3; .375 INJECTION, POWDER, FOR SOLUTION INTRAVENOUS at 08:43

## 2019-04-06 RX ADMIN — PIPERACILLIN SODIUM,TAZOBACTAM SODIUM 3.38 G: 3; .375 INJECTION, POWDER, FOR SOLUTION INTRAVENOUS at 21:37

## 2019-04-06 RX ADMIN — DEXTROSE MONOHYDRATE, SODIUM CHLORIDE, AND POTASSIUM CHLORIDE 75 ML/HR: 50; 4.5; 1.49 INJECTION, SOLUTION INTRAVENOUS at 19:41

## 2019-04-06 RX ADMIN — KETOROLAC TROMETHAMINE 15 MG: 30 INJECTION, SOLUTION INTRAMUSCULAR; INTRAVENOUS at 19:19

## 2019-04-06 RX ADMIN — KETOROLAC TROMETHAMINE 15 MG: 30 INJECTION, SOLUTION INTRAMUSCULAR; INTRAVENOUS at 06:11

## 2019-04-06 RX ADMIN — ENOXAPARIN SODIUM 40 MG: 40 INJECTION SUBCUTANEOUS at 18:15

## 2019-04-06 RX ADMIN — KETOROLAC TROMETHAMINE 15 MG: 30 INJECTION, SOLUTION INTRAMUSCULAR; INTRAVENOUS at 12:24

## 2019-04-06 NOTE — ANCILLARY DISCHARGE INSTRUCTIONS
Patient and/or next of kin has been given the [facility] Important Message From Medicare About Your Rights\" letter and all questions were answered. Paper copy.

## 2019-04-06 NOTE — PROGRESS NOTES
Problem: Pain Goal: *Control of Pain Outcome: Progressing Towards Goal 
  
Problem: Patient Education: Go to Patient Education Activity Goal: Patient/Family Education Outcome: Progressing Towards Goal 
  
Problem: Falls - Risk of 
Goal: *Absence of Falls Description Document Josesito Peraza Fall Risk and appropriate interventions in the flowsheet. Outcome: Progressing Towards Goal 
  
Problem: Patient Education: Go to Patient Education Activity Goal: Patient/Family Education Outcome: Progressing Towards Goal 
  
Problem: Surgical Pathway Post-Op Day 1 Goal: Activity/Safety Outcome: Progressing Towards Goal 
Goal: Diagnostic Test/Procedures Outcome: Progressing Towards Goal 
Goal: Nutrition/Diet Outcome: Progressing Towards Goal 
Goal: Discharge Planning Outcome: Progressing Towards Goal 
Goal: Medications Outcome: Progressing Towards Goal 
Goal: Respiratory Outcome: Progressing Towards Goal 
Goal: Treatments/Interventions/Procedures Outcome: Progressing Towards Goal 
Goal: Psychosocial 
Outcome: Progressing Towards Goal 
Goal: *No signs and symptoms of infection or wound complications Outcome: Progressing Towards Goal 
Goal: *Optimal pain control at patient's stated goal 
Outcome: Progressing Towards Goal 
Goal: *Adequate urinary output (equal to or greater than 30 milliliters/hour) Outcome: Progressing Towards Goal 
Goal: *Hemodynamically stable Outcome: Progressing Towards Goal 
Goal: *Tolerating diet Outcome: Progressing Towards Goal 
Goal: *Demonstrates progressive activity Outcome: Progressing Towards Goal 
Goal: *Lungs clear or at baseline Outcome: Progressing Towards Goal 
  
Problem: Surgical Pathway Post-Op Day 2 through Discharge Goal: Activity/Safety Outcome: Progressing Towards Goal 
Goal: Nutrition/Diet Outcome: Progressing Towards Goal 
Goal: Discharge Planning Outcome: Progressing Towards Goal 
Goal: Medications Outcome: Progressing Towards Goal 
Goal: Respiratory Outcome: Progressing Towards Goal 
Goal: Treatments/Interventions/Procedures Outcome: Progressing Towards Goal 
Goal: Psychosocial 
Outcome: Progressing Towards Goal 
Goal: *No signs and symptoms of infection or wound complications Outcome: Progressing Towards Goal 
Goal: *Optimal pain control at patient's stated goal 
Outcome: Progressing Towards Goal 
Goal: *Adequate urinary output (equal to or greater than 30 milliliters/hour) Outcome: Progressing Towards Goal 
Goal: *Hemodynamically stable Outcome: Progressing Towards Goal 
Goal: *Tolerating diet Outcome: Progressing Towards Goal 
Goal: *Demonstrates progressive activity Outcome: Progressing Towards Goal 
Goal: *Lungs clear or at baseline Outcome: Progressing Towards Goal 
  
Problem: Surgical Pathway: Discharge Outcomes Goal: *Hemodynamically stable Outcome: Progressing Towards Goal 
Goal: *Lungs clear or at baseline Outcome: Progressing Towards Goal 
Goal: *Demonstrates independent activity or return to baseline Outcome: Progressing Towards Goal 
Goal: *Optimal pain control at patient's stated goal 
Outcome: Progressing Towards Goal 
Goal: *Verbalizes understanding and describes prescribed diet Outcome: Progressing Towards Goal 
Goal: *Tolerating diet Outcome: Progressing Towards Goal 
Goal: *Verbalizes name, dosage, time, side effects, and number of days to continue medications Outcome: Progressing Towards Goal 
Goal: *No signs and symptoms of infection or wound complications Outcome: Progressing Towards Goal 
Goal: *Anxiety reduced or absent Outcome: Progressing Towards Goal 
Goal: *Understands and describes signs and symptoms to report to providers(Stroke Metric) Outcome: Progressing Towards Goal 
Goal: *Describes follow-up/return visits to physicians Outcome: Progressing Towards Goal 
Goal: *Describes available resources and support systems Outcome: Progressing Towards Goal 
  
Problem: Pressure Injury - Risk of 
 Goal: *Prevention of pressure injury Description Document Roger Scale and appropriate interventions in the flowsheet. Outcome: Progressing Towards Goal 
  
Problem: Patient Education: Go to Patient Education Activity Goal: Patient/Family Education Outcome: Progressing Towards Goal 
  
Problem: Discharge Planning Goal: *Discharge to safe environment Outcome: Progressing Towards Goal 
  
Problem: Nutrition Deficit Goal: *Optimize nutritional status Outcome: Progressing Towards Goal

## 2019-04-06 NOTE — PROGRESS NOTES
0810.Bedside and Verbal shift change report given to this nurse Mejia Flowers (oncoming nurse) by Agapito Cole (offgoing nurse). Report included the following information SBAR, Kardex and MAR.  
 
1800 Joe Becerra from lab called patient lab culture lavender tube= gram+cocci in clusters. Lab to notify MD. 
 
2232. Bedside and Verbal shift change report given to Nick Nance (oncoming nurse) by this nurse Mejia Flowers (offgoing nurse). Report included the following information SBAR, Kardex and MAR.

## 2019-04-06 NOTE — PROGRESS NOTES
Patient seen and examined. Doing well. Has no complaints except the NG tube. His vitals are normal. Labs are not back. Abdomen is soft and non-tender. Wound is clean. Plan: D/C NG tube D/C hughes Ambulation Allow water Close observation for risk of leak and wound infection Continue with antibiotics

## 2019-04-06 NOTE — PROGRESS NOTES
Bedside and Verbal shift change report given to Arley Wolfe RN (oncoming nurse) by Arley Wolfe RN (offgoing nurse). Report included the following information SBAR, Kardex, Intake/Output, MAR and Recent Results. 0630 Carter and NGT discontinued per MD order tolerated with minimal discomfort. Bedside and Verbal shift change report given to Nena Augustin RN (oncoming nurse) by Arley Wolfe RN (offgoing nurse). Report included the following information SBAR, Kardex, Intake/Output, MAR and Recent Results.

## 2019-04-07 PROCEDURE — 65270000029 HC RM PRIVATE

## 2019-04-07 PROCEDURE — 74011250636 HC RX REV CODE- 250/636: Performed by: SURGERY

## 2019-04-07 PROCEDURE — 74011000258 HC RX REV CODE- 258: Performed by: SURGERY

## 2019-04-07 RX ORDER — MAGNESIUM SULFATE 100 %
4 CRYSTALS MISCELLANEOUS AS NEEDED
Status: DISCONTINUED | OUTPATIENT
Start: 2019-04-07 | End: 2019-04-09 | Stop reason: HOSPADM

## 2019-04-07 RX ORDER — DEXTROSE MONOHYDRATE 25 G/50ML
25-50 INJECTION, SOLUTION INTRAVENOUS AS NEEDED
Status: DISCONTINUED | OUTPATIENT
Start: 2019-04-07 | End: 2019-04-09 | Stop reason: HOSPADM

## 2019-04-07 RX ORDER — INSULIN LISPRO 100 [IU]/ML
INJECTION, SOLUTION INTRAVENOUS; SUBCUTANEOUS
Status: DISCONTINUED | OUTPATIENT
Start: 2019-04-07 | End: 2019-04-09 | Stop reason: HOSPADM

## 2019-04-07 RX ADMIN — KETOROLAC TROMETHAMINE 15 MG: 30 INJECTION, SOLUTION INTRAMUSCULAR; INTRAVENOUS at 18:08

## 2019-04-07 RX ADMIN — KETOROLAC TROMETHAMINE 15 MG: 30 INJECTION, SOLUTION INTRAMUSCULAR; INTRAVENOUS at 06:16

## 2019-04-07 RX ADMIN — PIPERACILLIN SODIUM,TAZOBACTAM SODIUM 3.38 G: 3; .375 INJECTION, POWDER, FOR SOLUTION INTRAVENOUS at 05:05

## 2019-04-07 RX ADMIN — ONDANSETRON 4 MG: 2 INJECTION INTRAMUSCULAR; INTRAVENOUS at 06:50

## 2019-04-07 RX ADMIN — PIPERACILLIN SODIUM,TAZOBACTAM SODIUM 3.38 G: 3; .375 INJECTION, POWDER, FOR SOLUTION INTRAVENOUS at 13:33

## 2019-04-07 RX ADMIN — DEXTROSE MONOHYDRATE, SODIUM CHLORIDE, AND POTASSIUM CHLORIDE 75 ML/HR: 50; 4.5; 1.49 INJECTION, SOLUTION INTRAVENOUS at 11:54

## 2019-04-07 RX ADMIN — KETOROLAC TROMETHAMINE 15 MG: 30 INJECTION, SOLUTION INTRAMUSCULAR; INTRAVENOUS at 14:02

## 2019-04-07 RX ADMIN — PIPERACILLIN SODIUM,TAZOBACTAM SODIUM 3.38 G: 3; .375 INJECTION, POWDER, FOR SOLUTION INTRAVENOUS at 21:04

## 2019-04-07 RX ADMIN — KETOROLAC TROMETHAMINE 15 MG: 30 INJECTION, SOLUTION INTRAMUSCULAR; INTRAVENOUS at 00:23

## 2019-04-07 RX ADMIN — ENOXAPARIN SODIUM 40 MG: 40 INJECTION SUBCUTANEOUS at 18:11

## 2019-04-07 NOTE — ROUTINE PROCESS
Bedside and Verbal shift change report given to Sal Miller (oncoming nurse) by Juan Martel (offgoing nurse). Report included the following information SBAR, Kardex, MAR and Recent Results.

## 2019-04-07 NOTE — PROGRESS NOTES
Bedside and Verbal shift change report given to Soledad Kirkland RN (oncoming nurse) by Frantz Perry RN (offgoing nurse). Report included the following information SBAR, Kardex and MAR. Pt resting in bed, on room air. Incision to ABD with staples, AMELIA/CDI. FIV running at 75ml/hr. Complaints of nausea but no vomiting. Zofran given at 0655. Call bell and suction w/i reach. Will cont to monitor. 1333- Pt resting in bed, on room air. Denies n/v/sob/cp at this time. Call bell w/i reach. 1615- Pt stated having difficulty breathing. O2 sat checked, at 97%. Pt refused to have HOB elevated above 30 degrees. Encouraged cough, deep breathing and use of ICS. Pt verbalized understanding and stated feeling better. Will continue to monitor. 1645- Pt resting in bed, watching tv. Voiced to distress at this time. 1810- Pt resting in bed. Pain is tolerable. Some discomfort around incision site in ABd. Educated on infection prevention and good hand hygiene. Pt verbalized understanding. Bedside and Verbal shift change report given to Frantz Perry RN (oncoming nurse) by Soledad Kirkland RN (offgoing nurse). Report included the following information SBAR, Kardex and MAR.

## 2019-04-07 NOTE — PROGRESS NOTES
1943: Received patient in bed awake,alert and oriented x4. No signs of distress. Bed low and locked. Call bell within reach. 0600: In bed resting quietly, uneventful night no other concern at this time,call bell within reach. Will continue monitoring. Patient Vitals for the past 12 hrs: 
 Temp Pulse Resp BP SpO2  
04/08/19 0416 98.3 °F (36.8 °C) 64 16 131/85 99 % 04/08/19 0041 98.6 °F (37 °C) 60 16 125/87 96 % 04/07/19 2011 98.5 °F (36.9 °C) (!) 59 16 132/69 96 %

## 2019-04-07 NOTE — PROGRESS NOTES
Patient seen and examined. Doing well. No issues overnight. He is hungry. He passed flatus but no BM. He has no nausea or vomiting. No fever or tachycardia. His abdomen is soft and non-tender. His midline wound is clean with no evidence of infection. Staplers in place. Plan: Allow clear liquid diet. I decreased his IV fluids to 75 cc per hour Ambulation Continue with the antibiotics probably for one more day Close observation for the risk of developing leak or wound infection

## 2019-04-07 NOTE — PROGRESS NOTES
Problem: Pain Goal: *Control of Pain Outcome: Progressing Towards Goal 
  
Problem: Patient Education: Go to Patient Education Activity Goal: Patient/Family Education Outcome: Progressing Towards Goal 
  
Problem: Falls - Risk of 
Goal: *Absence of Falls Description Document Amina Latin Fall Risk and appropriate interventions in the flowsheet. Outcome: Progressing Towards Goal 
  
Problem: Patient Education: Go to Patient Education Activity Goal: Patient/Family Education Outcome: Progressing Towards Goal 
  
Problem: Surgical Pathway Post-Op Day 2 through Discharge Goal: Off Pathway (Use only if patient is Off Pathway) Outcome: Progressing Towards Goal 
Goal: Activity/Safety Outcome: Progressing Towards Goal 
Goal: Nutrition/Diet Outcome: Progressing Towards Goal 
Goal: Discharge Planning Outcome: Progressing Towards Goal 
Goal: Medications Outcome: Progressing Towards Goal 
Goal: Respiratory Outcome: Progressing Towards Goal 
Goal: Treatments/Interventions/Procedures Outcome: Progressing Towards Goal 
Goal: Psychosocial 
Outcome: Progressing Towards Goal 
Goal: *No signs and symptoms of infection or wound complications Outcome: Progressing Towards Goal 
Goal: *Optimal pain control at patient's stated goal 
Outcome: Progressing Towards Goal 
Goal: *Adequate urinary output (equal to or greater than 30 milliliters/hour) Outcome: Progressing Towards Goal 
Goal: *Hemodynamically stable Outcome: Progressing Towards Goal 
Goal: *Tolerating diet Outcome: Progressing Towards Goal 
Goal: *Demonstrates progressive activity Outcome: Progressing Towards Goal 
Goal: *Lungs clear or at baseline Outcome: Progressing Towards Goal 
  
Problem: Surgical Pathway: Discharge Outcomes Goal: *Hemodynamically stable Outcome: Progressing Towards Goal 
Goal: *Lungs clear or at baseline Outcome: Progressing Towards Goal 
Goal: *Demonstrates independent activity or return to baseline Outcome: Progressing Towards Goal 
 Goal: *Optimal pain control at patient's stated goal 
Outcome: Progressing Towards Goal 
Goal: *Verbalizes understanding and describes prescribed diet Outcome: Progressing Towards Goal 
Goal: *Tolerating diet Outcome: Progressing Towards Goal 
Goal: *Verbalizes name, dosage, time, side effects, and number of days to continue medications Outcome: Progressing Towards Goal 
Goal: *No signs and symptoms of infection or wound complications Outcome: Progressing Towards Goal 
Goal: *Anxiety reduced or absent Outcome: Progressing Towards Goal 
Goal: *Understands and describes signs and symptoms to report to providers(Stroke Metric) Outcome: Progressing Towards Goal 
Goal: *Describes follow-up/return visits to physicians Outcome: Progressing Towards Goal 
Goal: *Describes available resources and support systems Outcome: Progressing Towards Goal 
  
Problem: Pressure Injury - Risk of 
Goal: *Prevention of pressure injury Description Document Roger Scale and appropriate interventions in the flowsheet. Outcome: Progressing Towards Goal 
  
Problem: Patient Education: Go to Patient Education Activity Goal: Patient/Family Education Outcome: Progressing Towards Goal 
  
Problem: Discharge Planning Goal: *Discharge to safe environment Outcome: Progressing Towards Goal 
  
Problem: Nutrition Deficit Goal: *Optimize nutritional status Outcome: Progressing Towards Goal

## 2019-04-07 NOTE — PROGRESS NOTES
1926: Received patient in bed awake,laert and oriented x4. No signs of distress. Bed low and locked. Call bell within reach. Will continue monitoring. 0522: Dr Husam Noriega in patient room at this time. 0600: In bed resting quietly,no other concern at this time,callb ell within reach. Will monitor. 9193: Patient feels nauseated,zofran 4 mg IV  given. Will monitor. Patient Vitals for the past 12 hrs: 
 Temp Pulse Resp BP SpO2  
04/07/19 0458 97.9 °F (36.6 °C) 65 16 133/70 97 % 04/07/19 0015 97.8 °F (36.6 °C) 73 16 129/70 98 % 04/06/19 2032 98.4 °F (36.9 °C) 72 16 139/83 99 %

## 2019-04-08 LAB
ANION GAP SERPL CALC-SCNC: 6 MMOL/L (ref 3–18)
BACTERIA SPEC CULT: ABNORMAL
BASOPHILS # BLD: 0 K/UL (ref 0–0.1)
BASOPHILS NFR BLD: 0 % (ref 0–2)
BUN SERPL-MCNC: 16 MG/DL (ref 7–18)
BUN/CREAT SERPL: 15 (ref 12–20)
CALCIUM SERPL-MCNC: 7.6 MG/DL (ref 8.5–10.1)
CHLORIDE SERPL-SCNC: 109 MMOL/L (ref 100–108)
CO2 SERPL-SCNC: 25 MMOL/L (ref 21–32)
CREAT SERPL-MCNC: 1.06 MG/DL (ref 0.6–1.3)
DIFFERENTIAL METHOD BLD: ABNORMAL
EOSINOPHIL # BLD: 0.2 K/UL (ref 0–0.4)
EOSINOPHIL NFR BLD: 5 % (ref 0–5)
ERYTHROCYTE [DISTWIDTH] IN BLOOD BY AUTOMATED COUNT: 13.4 % (ref 11.6–14.5)
GLUCOSE BLD STRIP.AUTO-MCNC: 109 MG/DL (ref 70–110)
GLUCOSE BLD STRIP.AUTO-MCNC: 138 MG/DL (ref 70–110)
GLUCOSE BLD STRIP.AUTO-MCNC: 139 MG/DL (ref 70–110)
GLUCOSE BLD STRIP.AUTO-MCNC: 142 MG/DL (ref 70–110)
GLUCOSE BLD STRIP.AUTO-MCNC: 153 MG/DL (ref 70–110)
GLUCOSE SERPL-MCNC: 143 MG/DL (ref 74–99)
GRAM STN SPEC: ABNORMAL
HCT VFR BLD AUTO: 38 % (ref 36–48)
HGB BLD-MCNC: 12 G/DL (ref 13–16)
LYMPHOCYTES # BLD: 0.6 K/UL (ref 0.9–3.6)
LYMPHOCYTES NFR BLD: 17 % (ref 21–52)
MCH RBC QN AUTO: 31.3 PG (ref 24–34)
MCHC RBC AUTO-ENTMCNC: 31.6 G/DL (ref 31–37)
MCV RBC AUTO: 99.2 FL (ref 74–97)
MONOCYTES # BLD: 0.5 K/UL (ref 0.05–1.2)
MONOCYTES NFR BLD: 13 % (ref 3–10)
NEUTS SEG # BLD: 2.6 K/UL (ref 1.8–8)
NEUTS SEG NFR BLD: 65 % (ref 40–73)
PLATELET # BLD AUTO: 255 K/UL (ref 135–420)
PMV BLD AUTO: 10.3 FL (ref 9.2–11.8)
POTASSIUM SERPL-SCNC: 4.2 MMOL/L (ref 3.5–5.5)
RBC # BLD AUTO: 3.83 M/UL (ref 4.7–5.5)
SERVICE CMNT-IMP: ABNORMAL
SERVICE CMNT-IMP: ABNORMAL
SODIUM SERPL-SCNC: 140 MMOL/L (ref 136–145)
WBC # BLD AUTO: 3.9 K/UL (ref 4.6–13.2)

## 2019-04-08 PROCEDURE — 77010033678 HC OXYGEN DAILY

## 2019-04-08 PROCEDURE — 36415 COLL VENOUS BLD VENIPUNCTURE: CPT

## 2019-04-08 PROCEDURE — 82962 GLUCOSE BLOOD TEST: CPT

## 2019-04-08 PROCEDURE — 74011250636 HC RX REV CODE- 250/636: Performed by: SURGERY

## 2019-04-08 PROCEDURE — 74011000258 HC RX REV CODE- 258: Performed by: SURGERY

## 2019-04-08 PROCEDURE — 65270000029 HC RM PRIVATE

## 2019-04-08 PROCEDURE — 80048 BASIC METABOLIC PNL TOTAL CA: CPT

## 2019-04-08 PROCEDURE — 85025 COMPLETE CBC W/AUTO DIFF WBC: CPT

## 2019-04-08 RX ADMIN — ENOXAPARIN SODIUM 40 MG: 40 INJECTION SUBCUTANEOUS at 16:47

## 2019-04-08 RX ADMIN — KETOROLAC TROMETHAMINE 15 MG: 30 INJECTION, SOLUTION INTRAMUSCULAR; INTRAVENOUS at 13:06

## 2019-04-08 RX ADMIN — DEXTROSE MONOHYDRATE, SODIUM CHLORIDE, AND POTASSIUM CHLORIDE 75 ML/HR: 50; 4.5; 1.49 INJECTION, SOLUTION INTRAVENOUS at 01:00

## 2019-04-08 RX ADMIN — PIPERACILLIN SODIUM,TAZOBACTAM SODIUM 3.38 G: 3; .375 INJECTION, POWDER, FOR SOLUTION INTRAVENOUS at 06:00

## 2019-04-08 RX ADMIN — KETOROLAC TROMETHAMINE 15 MG: 30 INJECTION, SOLUTION INTRAMUSCULAR; INTRAVENOUS at 20:13

## 2019-04-08 RX ADMIN — KETOROLAC TROMETHAMINE 15 MG: 30 INJECTION, SOLUTION INTRAMUSCULAR; INTRAVENOUS at 00:41

## 2019-04-08 NOTE — PROGRESS NOTES
NUTRITION follow-up/Plan of care RECOMMENDATIONS:  
1. FL: advance diet when medically indicated and per Pt tolerance 2. Monitor labs, weight and PO intake 3. RD to follow GOALS:  
1. Ongoing: PO intake meets >75% of protein/calorie needs by 4/13 
2. Ongoing: Maintain weight (+/- 1-2 lb) by 4/15 ASSESSMENT:  
Wt status is classified as normal per Body mass index is 22.58 kg/m². However, Pt at risk d/t BMI ,23 and age >65 years. Currently tolerating FL diet. Labs noted. BG range () over the past 24 hours; A1c (8.8). Nutrition recommendations listed. RD to follow. Nutrition Risk:  []   High [x]  Moderate [] Low SUBJECTIVE/OBJECTIVE:  
(4/8): NGT was removed on (4/6). Pt is tolerating CL, +flatus and +BM so diet advanced to FL this morning. Pt seen in room at lunch; observed 100% of FL tray consumed and tolerating. Encouraged gradual increase in intake as tolerated and will monitor diet advancement and follow up with education at that time. Below per previous RD note: Pt presented to ED with sudden onset of abdominal pain, N/V. He is s/p  exploratory laparotomy, oversew sigmoid diverticulum; irrigation of abdomen 4/4/19 with NGT to continuous low suction for perforated sigmoid diverticulum. 4/5/19:  Pt reports his weight was stable PTA. He does not have known food allergies and he denies issues with chewing or swallowing. Pt states he is feeling  Better. 
  
 
Information Obtained From:  
[x] Chart Review [x] Patient 
[] Family/Caregiver 
[] Nurse/Physician  
[] Patient Rounds/Interdisciplinary Meeting Diet: Full Liquid Diet Patient Vitals for the past 100 hrs: 
 % Diet Eaten 04/07/19 1314 5 % Medications: [x] Reviewed IV: D5 1/2 NS w/ KCl 20 mEq/L @50 mL/hr (204 kcal/day) Encounter Diagnoses ICD-10-CM ICD-9-CM 1. Pneumoperitoneum of unknown etiology K66.8 568.89 2. Leukocytosis, unspecified type D72.829 288.60 Past Medical History:  
Diagnosis Date  Abdominal pain  Acute cystitis with hematuria  Bladder mass  Bladder pain  BPH (benign prostatic hyperplasia)  BPH with obstruction/lower urinary tract symptoms  Chronic prostatitis  Diabetes ([facility] [address]  ED (erectile dysfunction)  Elevated PSA  Essential hypertension  Hematuria  History of UTI  Hypercholesterolemia  Nocturia  Urinary tract infection, site not specified  Urine leukocytes Labs:   
Lab Results Component Value Date/Time Sodium 140 04/08/2019 01:30 AM  
 Potassium 4.2 04/08/2019 01:30 AM  
 Chloride 109 (H) 04/08/2019 01:30 AM  
 CO2 25 04/08/2019 01:30 AM  
 Anion gap 6 04/08/2019 01:30 AM  
 Glucose 143 (H) 04/08/2019 01:30 AM  
 BUN 16 04/08/2019 01:30 AM  
 Creatinine 1.06 04/08/2019 01:30 AM  
 Calcium 7.6 (L) 04/08/2019 01:30 AM  
 Albumin 2.4 (L) 04/05/2019 02:45 AM  
 
Anthropometrics: BMI (calculated): 22.6 Last 3 Recorded Weights in this Encounter 04/05/19 1527 04/06/19 1601 04/07/19 1643 Weight: 61.2 kg (135 lb) 64.5 kg (142 lb 3.2 oz) 64.4 kg (142 lb) Ht Readings from Last 1 Encounters:  
04/05/19 5' 6.5\" (1.689 m) Weight Metrics 4/7/2019 4/3/2019 2/21/2019 11/9/2018 5/8/2018 2/8/2018 11/6/2017 Weight 142 lb 133 lb 130 lb 135 lb 135 lb 135 lb 135 lb BMI 22.58 kg/m2 21.15 kg/m2 20.67 kg/m2 21.79 kg/m2 21.79 kg/m2 21.79 kg/m2 21.79 kg/m2 []  Weight Loss 
[]  Weight Gain 
[x]  Weight Stable PTA per patient 
[]  New wt n/a on record Estimated Nutrition Needs:  
0407 Kcals/day Protein (g): 92 g Nutrition Problems Identified:  
[x] Suboptimal PO intake v/s FL 
[] Food Allergies [] Difficulty chewing/swallowing/poor dentition 
[] Constipation/Diarrhea  
[] Nausea/Vomiting  
[] None 
[] Other:  
 
Plan:  
[x] Therapeutic Diet 
[]  Obtained/adjusted food preferences/tolerances and/or snacks options  
[]  Supplements added  
[] Occupational therapy following for feeding techniques []  HS snack added  
[]  Modify diet texture  
[]  Modify diet for food allergies [x]  Educate patient when diet advanced 
[]  Assist with menu selection  
[x]  Monitor PO intake on meal rounds  
[x]  Continue inpatient monitoring and intervention  
[]  Participated in discharge planning/Interdisciplinary rounds/Team meetings  
[]  Other:  
 
Education Needs: 
 [x] Not appropriate for teaching at this time due to: [facility] 
 [] Identified and addressed Nutrition Monitoring and Evaluation: 
 [x] Continue inpatient monitoring and interventions [] Other:  
 
Fidencio Marin

## 2019-04-08 NOTE — PROGRESS NOTES
Bedside shift report received from [facility] with sbar  in to see patient Tolerated full liquids well Patient had large loose bm Bedside shift report given to RYAN Rosenthal with sbar

## 2019-04-08 NOTE — ROUTINE PROCESS
Bedside and Verbal shift change report given to Tammie Artis (oncoming nurse) by Wendy Sinclair (offgoing nurse). Report included the following information SBAR, Kardex, MAR and Recent Results.

## 2019-04-08 NOTE — PROGRESS NOTES
Patient seen and examined. He is doing well . He is tolerating his clear liquid diet and he had flatus and bowel movement. He is stating that he is hungry for regular food. His abdominal pain is well controlled, except when coughing or moving he feels pain at the midline incision. He is afebrile with no tachycardia. His WBC is normal today. His abdomen is soft with some incisional tenderness. Though there is no erythema or drainage there is a slight asymmetry of the wound, but there is no evidence of any collection. Plan: 
Advance to full liquid diet DC the IV antibiotics Decrease IV fluids to 50 cc/h Ambulation DVT prophylaxis Close observation of his wound to make sure he does not have any collection or evisceration

## 2019-04-09 VITALS
HEART RATE: 60 BPM | RESPIRATION RATE: 16 BRPM | DIASTOLIC BLOOD PRESSURE: 70 MMHG | SYSTOLIC BLOOD PRESSURE: 151 MMHG | TEMPERATURE: 97.9 F | BODY MASS INDEX: 23.02 KG/M2 | HEIGHT: 67 IN | WEIGHT: 146.7 LBS | OXYGEN SATURATION: 94 %

## 2019-04-09 LAB
BACTERIA SPEC CULT: ABNORMAL
GLUCOSE BLD STRIP.AUTO-MCNC: 106 MG/DL (ref 70–110)
SERVICE CMNT-IMP: ABNORMAL

## 2019-04-09 PROCEDURE — 82962 GLUCOSE BLOOD TEST: CPT

## 2019-04-09 PROCEDURE — 74011250636 HC RX REV CODE- 250/636: Performed by: SURGERY

## 2019-04-09 RX ORDER — OXYCODONE AND ACETAMINOPHEN 5; 325 MG/1; MG/1
2 TABLET ORAL
Qty: 20 TAB | Refills: 0 | Status: SHIPPED | OUTPATIENT
Start: 2019-04-09 | End: 2019-04-12

## 2019-04-09 RX ADMIN — KETOROLAC TROMETHAMINE 15 MG: 30 INJECTION, SOLUTION INTRAMUSCULAR; INTRAVENOUS at 06:19

## 2019-04-09 RX ADMIN — KETOROLAC TROMETHAMINE 15 MG: 30 INJECTION, SOLUTION INTRAMUSCULAR; INTRAVENOUS at 00:12

## 2019-04-09 RX ADMIN — DEXTROSE MONOHYDRATE, SODIUM CHLORIDE, AND POTASSIUM CHLORIDE 50 ML/HR: 50; 4.5; 1.49 INJECTION, SOLUTION INTRAVENOUS at 06:19

## 2019-04-09 NOTE — PROGRESS NOTES
completed a follow up visit with patient  In room 2205 this morning. and Spiritual assessment of patient and offered Pastoral care support once again. Patient shared that he was relieved to be discharged this afternoon. Chaplains will continue to follow and will provide pastoral care on an as needed/requested basis Phyllis 3 Board Certified [employer] Spiritual Care  
[phone]

## 2019-04-09 NOTE — PROGRESS NOTES
Bedside shift change report given to John Claros RN (oncoming nurse) by Lukas Cavazos RN (offgoing nurse). Report included the following information SBAR, Kardex and MAR. Patient reported that he had home medications at the bedside the medications were collected by this RN and bagged and sent down to pharmacy. Patient states that he did not take any of the medications while be a patient here at [facility]. Will continue to monitor. Bedside shift change report given to Mika Cerrato (oncoming nurse) by John Claros RN (offgoing nurse). Report included the following information SBAR, Kardex and MAR.

## 2019-04-09 NOTE — DISCHARGE INSTRUCTIONS
DISCHARGE SUMMARY from Nurse    PATIENT INSTRUCTIONS:    After general anesthesia or intravenous sedation, for 24 hours or while taking prescription Narcotics:  · Limit your activities  · Do not drive and operate hazardous machinery  · Do not make important personal or business decisions  · Do  not drink alcoholic beverages  · If you have not urinated within 8 hours after discharge, please contact your surgeon on call. Report the following to your surgeon:  · Excessive pain, swelling, redness or odor of or around the surgical area  · Temperature over 100.5  · Nausea and vomiting lasting longer than 4 hours or if unable to take medications  · Any signs of decreased circulation or nerve impairment to extremity: change in color, persistent  numbness, tingling, coldness or increase pain  · Any questions    What to do at Home:  Recommended activity: Activity as tolerated,     *  Please give a list of your current medications to your Primary Care Provider. *  Please update this list whenever your medications are discontinued, doses are      changed, or new medications (including over-the-counter products) are added. *  Please carry medication information at all times in case of emergency situations. These are general instructions for a healthy lifestyle:    No smoking/ No tobacco products/ Avoid exposure to second hand smoke  Surgeon General's Warning:  Quitting smoking now greatly reduces serious risk to your health. Obesity, smoking, and sedentary lifestyle greatly increases your risk for illness    A healthy diet, regular physical exercise & weight monitoring are important for maintaining a healthy lifestyle    You may be retaining fluid if you have a history of heart failure or if you experience any of the following symptoms:  Weight gain of 3 pounds or more overnight or 5 pounds in a week, increased swelling in our hands or feet or shortness of breath while lying flat in bed.   Please call your doctor as soon as you notice any of these symptoms; do not wait until your next office visit. Recognize signs and symptoms of STROKE:    F-face looks uneven    A-arms unable to move or move unevenly    S-speech slurred or non-existent    T-time-call 911 as soon as signs and symptoms begin-DO NOT go       Back to bed or wait to see if you get better-TIME IS BRAIN. Warning Signs of HEART ATTACK     Call 911 if you have these symptoms:   Chest discomfort. Most heart attacks involve discomfort in the center of the chest that lasts more than a few minutes, or that goes away and comes back. It can feel like uncomfortable pressure, squeezing, fullness, or pain.  Discomfort in other areas of the upper body. Symptoms can include pain or discomfort in one or both arms, the back, neck, jaw, or stomach.  Shortness of breath with or without chest discomfort.  Other signs may include breaking out in a cold sweat, nausea, or lightheadedness. Don't wait more than five minutes to call 911 - MINUTES MATTER! Fast action can save your life. Calling 911 is almost always the fastest way to get lifesaving treatment. Emergency Medical Services staff can begin treatment when they arrive -- up to an hour sooner than if someone gets to the hospital by car. The discharge information has been reviewed with the patient. The patient verbalized understanding. Discharge medications reviewed with the patient and appropriate educational materials and side effects teaching were provided. ___________________________________________________________________________________________________________________________________You may shower, or Bath. You can place a light dressing over the wound if it is more comfortable. You may need some Milk of Magnesia  For constipation.

## 2019-04-09 NOTE — PROGRESS NOTES
Care Management Interventions PCP Verified by CM: Yes 
Palliative Care Criteria Met (RRAT>21 & CHF Dx)?: No 
Transition of Care Consult (CM Consult): Other(home) Current Support Network: Lives Alone(Fiance') Confirm Follow Up Transport: Family(Fiance') Plan discussed with Pt/Family/Caregiver: Yes Discharge Location Discharge Placement: Home

## 2019-04-09 NOTE — DISCHARGE SUMMARY
General Surgery  Discharge Summary     Patient: Tyesha Rubio MRN: [id number]  SSN: [id number]    YOB: 1934  Age: 81 y.o. Sex: male       Admit Date: 4/4/2019    Discharge Date: 4/9/2019      Admission Diagnoses: Status post exploratory laparotomy [Z98.890]    Discharge Diagnoses:   Problem List as of 4/9/2019 Date Reviewed: 2/21/2019          Codes Class Noted - Resolved    Perforated diverticulum of large intestine ICD-10-CM: K57.20  ICD-9-CM: 562.10  4/4/2019 - Present        Status post exploratory laparotomy ICD-10-CM: Z98.890  ICD-9-CM: V45.89  4/4/2019 - Present        Diabetes ([facility] ICD-10-CM: E11.9  ICD-9-CM: 250.00  2/21/2019 - Present        BPH (benign prostatic hyperplasia) ICD-10-CM: N40.0  ICD-9-CM: 600.00  2/21/2019 - Present        Hypercholesterolemia ICD-10-CM: E78.00  ICD-9-CM: 272.0  2/21/2019 - Present        Type 2 diabetes with nephropathy ([facility] ICD-10-CM: E11.21  ICD-9-CM: 250.40, 583.81  2/21/2019 - Present        Elevated PSA ICD-10-CM: R97.20  ICD-9-CM: 790.93  11/7/2017 - Present        Benign prostatic hyperplasia with lower urinary tract symptoms ICD-10-CM: N40.1  ICD-9-CM: 600.01  10/26/2016 - Present        Erectile dysfunction ICD-10-CM: N52.9  ICD-9-CM: 607.84  10/26/2016 - Present        Hematuria ICD-10-CM: R31.9  ICD-9-CM: 599.70  9/11/2014 - Present        RESOLVED: Bladder mass ICD-10-CM: N32.89  ICD-9-CM: 596.89  3/12/2013 - 10/26/2016        RESOLVED: Hypertrophy of prostate with urinary obstruction and other lower urinary tract symptoms (LUTS) ICD-10-CM: N40.1  ICD-9-CM: 600.01  5/17/2012 - 10/26/2016               Discharge Condition: Good    Hospital Course: The patient was admitted with a perforated sigmoid diverticulitis. He underwent a sigmoid resection and primary anastomosis. Post operativly he is doing well. He is on a regular diet. We will D/C on regular medications and percocet for pain.       Consults: None    Significant Diagnostic Studies: CT showing Perforation    Disposition: home    Discharge Medications:   Current Discharge Medication List      CONTINUE these medications which have NOT CHANGED    Details   neomycin-polymyxin-hc (CORTISPORIN) 3.5-10,000-10 mg-unit-mg/mL ophthalmic suspension Administer 1 Drop to both eyes four (4) times daily. Qty: 1 Bottle, Refills: 0    Associated Diagnoses: Acute bacterial conjunctivitis of left eye      finasteride (PROSCAR) 5 mg tablet take 1 tablet by mouth once daily  Qty: 90 Tab, Refills: 3    Associated Diagnoses: Benign prostatic hyperplasia with lower urinary tract symptoms, symptom details unspecified      pioglitazone (ACTOS) 45 mg tablet Take 1 Tab by mouth daily. Qty: 90 Tab, Refills: 3    Associated Diagnoses: Type 2 diabetes mellitus with hyperglycemia, without long-term current use of insulin ([facility])      insulin detemir U-100 (LEVEMIR FLEXTOUCH) 100 unit/mL (3 mL) inpn 10 Units by SubCUTAneous route as needed. empagliflozin (JARDIANCE) 25 mg tablet Take  by mouth daily. sildenafil citrate (VIAGRA) 50 mg tablet Take 1 Tab by mouth as needed. Qty: 10 Tab, Refills: 12    Associated Diagnoses: Erectile dysfunction due to arterial insufficiency      glucose blood VI test strips (FREESTYLE LITE STRIPS) strip Test blood glucose twice a day  Qty: 100 Strip, Refills: 6    Associated Diagnoses: Type 2 diabetes mellitus with hyperglycemia, without long-term current use of insulin ([facility])      triamcinolone acetonide (KENALOG) 0.1 % ointment Apply  to affected area two (2) times a day. use thin layer  Qty: 30 g, Refills: 0      pravastatin (PRAVACHOL) 40 mg tablet Take 40 mg by mouth nightly.              Activity: Activity as tolerated  Diet: Regular Diet  Wound Care: Keep wound clean and dry,  You may shower or bath    Follow-up Appointments   Procedures    FOLLOW UP VISIT Appointment in: One Week     Standing Status:   Standing     Number of Occurrences:   1     Order Specific Question: Appointment in     Answer:    One Week       Home or Self Care    Signed By: Josias Mixon MD     April 9, 2019

## 2019-04-09 NOTE — ROUTINE PROCESS
I have reviewed discharge instructions with the patient. The patient verbalized understanding. Medication envelope from our pharmacy returned to patient. waiting on [facility] to deliver script meds and for ride home

## 2019-04-10 LAB
BACTERIA SPEC CULT: NORMAL
SERVICE CMNT-IMP: NORMAL

## 2019-04-11 PROBLEM — Z71.89 ADVANCE CARE PLANNING: Status: ACTIVE | Noted: 2019-04-11

## 2019-04-12 NOTE — ACP (ADVANCE CARE PLANNING)
Advance Care Planning (ACP) Provider Note - Comprehensive     Date of ACP Conversation: 04/04/19  Persons included in Conversation:  patient  Length of ACP Conversation in minutes:  16 minutes    Authorized Decision Maker (if patient is incapable of making informed decisions): This person is:  his friend, Merline Rank for ALL Patients with Decision Making Capacity:   Importance of advance care planning, including choosing a healthcare agent to communicate patient's healthcare decisions if patient lost the ability to make decisions, such as after a sudden illness or accident  Understanding of the healthcare agent role was assessed and information provided  Exploration of values, goals, and preferences if recovery is not expected, even with continued medical treatment in the event of: Imminent death  Severe, permanent brain injury  \"In these circumstances, what matters most to you? \"  Care focused more on comfort or quality of life. \"What, if any, treatments would you want to avoid? \" none  Opportunity offered to explore how cultural, Judaism, spiritual, or personal beliefs would affect decisions for future care     Review of Existing Advance Directive:  What information were you given about medical decisions to consider before completing your advance directive? conversation starter    For Serious or Chronic Illness:  Understanding of medical condition      Interventions Provided:  Reviewed existing Advance Directive

## 2019-04-15 ENCOUNTER — HOSPITAL ENCOUNTER (EMERGENCY)
Age: 84
Discharge: LEFT BEFORE DISCHARGE | End: 2019-04-15
Attending: EMERGENCY MEDICINE
Payer: MEDICARE

## 2019-04-15 VITALS
SYSTOLIC BLOOD PRESSURE: 134 MMHG | OXYGEN SATURATION: 95 % | TEMPERATURE: 97.6 F | DIASTOLIC BLOOD PRESSURE: 90 MMHG | HEART RATE: 51 BPM | RESPIRATION RATE: 18 BRPM

## 2019-04-15 PROCEDURE — 75810000275 HC EMERGENCY DEPT VISIT NO LEVEL OF CARE

## 2019-04-15 PROCEDURE — 77030029684 HC NEB SM VOL KT MONA -A

## 2019-04-15 NOTE — ED TRIAGE NOTES
Patient comes in with complaints of abdominal pain. Patient states that he recently had surgery and was discharged on Tuesday. Patient states that he has not had a bowel movement since he got home.
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
## 2019-04-17 NOTE — PROGRESS NOTES
Patient has been scheduled for hospital follow up for 5/21/2019 @ 8am. Patient was back in the ER on 4/15/2019. Thank you.

## 2019-04-18 ENCOUNTER — HOSPITAL ENCOUNTER (EMERGENCY)
Age: 84
Discharge: HOME | End: 2019-04-18
Attending: EMERGENCY MEDICINE
Payer: MEDICARE

## 2019-04-18 ENCOUNTER — APPOINTMENT (OUTPATIENT)
Dept: CT IMAGING | Age: 84
End: 2019-04-18
Attending: EMERGENCY MEDICINE
Payer: MEDICARE

## 2019-04-18 ENCOUNTER — OFFICE VISIT (OUTPATIENT)
Dept: SURGERY | Age: 84
End: 2019-04-18

## 2019-04-18 ENCOUNTER — HOSPITAL ENCOUNTER (OUTPATIENT)
Dept: LAB | Age: 84
Discharge: HOME | End: 2019-04-18
Payer: MEDICARE

## 2019-04-18 ENCOUNTER — APPOINTMENT (OUTPATIENT)
Dept: GENERAL RADIOLOGY | Age: 84
End: 2019-04-18
Attending: EMERGENCY MEDICINE
Payer: MEDICARE

## 2019-04-18 VITALS
HEIGHT: 67 IN | WEIGHT: 121 LBS | TEMPERATURE: 96.6 F | DIASTOLIC BLOOD PRESSURE: 81 MMHG | SYSTOLIC BLOOD PRESSURE: 119 MMHG | HEART RATE: 77 BPM | RESPIRATION RATE: 20 BRPM | BODY MASS INDEX: 18.99 KG/M2

## 2019-04-18 VITALS
HEIGHT: 66 IN | WEIGHT: 121 LBS | BODY MASS INDEX: 19.44 KG/M2 | RESPIRATION RATE: 17 BRPM | TEMPERATURE: 98.1 F | SYSTOLIC BLOOD PRESSURE: 112 MMHG | DIASTOLIC BLOOD PRESSURE: 70 MMHG | OXYGEN SATURATION: 98 % | HEART RATE: 94 BPM

## 2019-04-18 DIAGNOSIS — R06.02 SOB (SHORTNESS OF BREATH): Primary | ICD-10-CM

## 2019-04-18 DIAGNOSIS — R06.02 SOB (SHORTNESS OF BREATH): ICD-10-CM

## 2019-04-18 DIAGNOSIS — Z01.818 PREOPERATIVE EXAMINATION: Primary | ICD-10-CM

## 2019-04-18 DIAGNOSIS — Z01.818 PREOPERATIVE EXAMINATION: ICD-10-CM

## 2019-04-18 LAB
ALBUMIN SERPL-MCNC: 3.3 G/DL (ref 3.4–5)
ALBUMIN/GLOB SERPL: 0.6 {RATIO} (ref 0.8–1.7)
ALP SERPL-CCNC: 98 U/L (ref 45–117)
ALT SERPL-CCNC: 31 U/L (ref 16–61)
ANION GAP SERPL CALC-SCNC: 7 MMOL/L (ref 3–18)
AST SERPL-CCNC: 19 U/L (ref 15–37)
BASOPHILS # BLD: 0 K/UL (ref 0–0.1)
BASOPHILS NFR BLD: 1 % (ref 0–2)
BILIRUB SERPL-MCNC: 0.6 MG/DL (ref 0.2–1)
BNP SERPL-MCNC: 31 PG/ML (ref 0–1800)
BUN SERPL-MCNC: 25 MG/DL (ref 7–18)
BUN/CREAT SERPL: 20 (ref 12–20)
CALCIUM SERPL-MCNC: 9.5 MG/DL (ref 8.5–10.1)
CHLORIDE SERPL-SCNC: 105 MMOL/L (ref 100–108)
CO2 SERPL-SCNC: 27 MMOL/L (ref 21–32)
CREAT SERPL-MCNC: 1.23 MG/DL (ref 0.6–1.3)
D DIMER PPP FEU-MCNC: 1.58 UG/ML(FEU)
DIFFERENTIAL METHOD BLD: ABNORMAL
EOSINOPHIL # BLD: 0.2 K/UL (ref 0–0.4)
EOSINOPHIL NFR BLD: 3 % (ref 0–5)
ERYTHROCYTE [DISTWIDTH] IN BLOOD BY AUTOMATED COUNT: 13.6 % (ref 11.6–14.5)
GLOBULIN SER CALC-MCNC: 5.2 G/DL (ref 2–4)
GLUCOSE SERPL-MCNC: 186 MG/DL (ref 74–99)
HCT VFR BLD AUTO: 47.1 % (ref 36–48)
HGB BLD-MCNC: 15.3 G/DL (ref 13–16)
LYMPHOCYTES # BLD: 1.7 K/UL (ref 0.9–3.6)
LYMPHOCYTES NFR BLD: 33 % (ref 21–52)
MCH RBC QN AUTO: 32.4 PG (ref 24–34)
MCHC RBC AUTO-ENTMCNC: 32.5 G/DL (ref 31–37)
MCV RBC AUTO: 99.8 FL (ref 74–97)
MONOCYTES # BLD: 0.3 K/UL (ref 0.05–1.2)
MONOCYTES NFR BLD: 7 % (ref 3–10)
NEUTS SEG # BLD: 2.8 K/UL (ref 1.8–8)
NEUTS SEG NFR BLD: 56 % (ref 40–73)
PLATELET # BLD AUTO: 798 K/UL (ref 135–420)
PMV BLD AUTO: 9.6 FL (ref 9.2–11.8)
POTASSIUM SERPL-SCNC: 4.2 MMOL/L (ref 3.5–5.5)
PROT SERPL-MCNC: 8.5 G/DL (ref 6.4–8.2)
RBC # BLD AUTO: 4.72 M/UL (ref 4.7–5.5)
SODIUM SERPL-SCNC: 139 MMOL/L (ref 136–145)
TROPONIN I SERPL-MCNC: <0.02 NG/ML (ref 0–0.04)
WBC # BLD AUTO: 5 K/UL (ref 4.6–13.2)

## 2019-04-18 PROCEDURE — 93005 ELECTROCARDIOGRAM TRACING: CPT

## 2019-04-18 PROCEDURE — 80053 COMPREHEN METABOLIC PANEL: CPT

## 2019-04-18 PROCEDURE — 36415 COLL VENOUS BLD VENIPUNCTURE: CPT

## 2019-04-18 PROCEDURE — 71275 CT ANGIOGRAPHY CHEST: CPT

## 2019-04-18 PROCEDURE — 74011000258 HC RX REV CODE- 258: Performed by: EMERGENCY MEDICINE

## 2019-04-18 PROCEDURE — 74011636320 HC RX REV CODE- 636/320: Performed by: EMERGENCY MEDICINE

## 2019-04-18 PROCEDURE — 83880 ASSAY OF NATRIURETIC PEPTIDE: CPT

## 2019-04-18 PROCEDURE — 85379 FIBRIN DEGRADATION QUANT: CPT

## 2019-04-18 PROCEDURE — 85025 COMPLETE CBC W/AUTO DIFF WBC: CPT

## 2019-04-18 PROCEDURE — 71046 X-RAY EXAM CHEST 2 VIEWS: CPT

## 2019-04-18 PROCEDURE — 84484 ASSAY OF TROPONIN QUANT: CPT

## 2019-04-18 PROCEDURE — 99283 EMERGENCY DEPT VISIT LOW MDM: CPT

## 2019-04-18 RX ORDER — SODIUM CHLORIDE 9 MG/ML
50 INJECTION, SOLUTION INTRAVENOUS
Status: COMPLETED | OUTPATIENT
Start: 2019-04-18 | End: 2019-04-18

## 2019-04-18 RX ADMIN — IOPAMIDOL 77 ML: 755 INJECTION, SOLUTION INTRAVENOUS at 20:21

## 2019-04-18 RX ADMIN — SODIUM CHLORIDE 50 ML: 900 INJECTION, SOLUTION INTRAVENOUS at 20:21

## 2019-04-18 NOTE — PROGRESS NOTES
Rd Holden is a 81 y.o. male who presents today with   Chief Complaint   Patient presents with    Surgical Follow-up     EX Lap perforated Diverticulum  4/4/2019                1. Have you been to the ER, urgent care clinic since your last visit? Hospitalized since your last visit? No    2. Have you seen or consulted any other health care providers outside of the [address] [place] [address] since your last visit? Include any pap smears or colon screening.  No
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
## 2019-04-18 NOTE — PROGRESS NOTES
Subjective:      Rd Holden is a 81 y.o. male presents for postop care 2 weeks status post exploratory laparotomy for perforated sigmoid diverticulitis. He underwent a sigmoid resection and primary anastomosis. Pain is well controlled. Appetite is normal. Eating a regular diet without difficulty. Bowel movements are regular. C/o sudden onset SOB several days ago both as rest and with exertion. No cough or wheezing. SOB so intense pt cannot speak, usually relieved with rest but sometimes lingers longer -- pt is concerned. Objective:     Visit Vitals  /81 (BP 1 Location: Left arm, BP Patient Position: At rest)   Pulse 77   Temp 96.6 °F (35.9 °C) (Oral)   Resp 20   Ht 5' 6.5\" (1.689 m)   Wt 54.9 kg (121 lb)   BMI 19.24 kg/m²       Physical Exam   Pulmonary/Chest: Breath sounds normal. He is in respiratory distress. He has no wheezes. He has no rales. General:  fatigued, cooperative, appears stated age   Abdomen: soft, bowel sounds active, non-tender   Incision:   healing well, no drainage, no erythema, no hernia, no seroma, no swelling, no dehiscence, incision well approximated, , one staple remains in place at umbilicus        Assessment:   New onset SOB post op   Wells score 4.5, moderate probability of PE -- will need further testing to rule out     Staple removed without complication, pt tolerated well   Plan:   D dimer and Ct to rule out PE   - Continue current medications  - Wound care discussed  - Pt is to increase activities as tolerated. - followup prn    Orders Placed This Encounter    CT CHEST W CONT     Standing Status:   Future     Standing Expiration Date:   7/17/2019     Order Specific Question:   Is Patient Allergic to Contrast Dye? Answer:   No     Order Specific Question:   STAT Creatinine as indicated     Answer:    Yes    D DIMER     Standing Status:   Future     Number of Occurrences:   1     Standing Expiration Date:   4/18/2020     CBC     Signed By: Oc Steve Travis Godfrey NP     April 18, 2019

## 2019-04-18 NOTE — ED TRIAGE NOTES
Arrived from surgery office. Had hole in colon repaired and was at office for post op check. C/o SOB. Here for chest xray
Patient/Caregiver provided printed discharge information.

## 2019-04-19 LAB
ATRIAL RATE: 72 BPM
CALCULATED P AXIS, ECG09: 61 DEGREES
CALCULATED R AXIS, ECG10: 74 DEGREES
CALCULATED T AXIS, ECG11: 64 DEGREES
DIAGNOSIS, 93000: NORMAL
P-R INTERVAL, ECG05: 128 MS
Q-T INTERVAL, ECG07: 378 MS
QRS DURATION, ECG06: 92 MS
QTC CALCULATION (BEZET), ECG08: 413 MS
VENTRICULAR RATE, ECG03: 72 BPM

## 2019-04-19 NOTE — DISCHARGE INSTRUCTIONS

## 2019-05-08 ENCOUNTER — TELEPHONE (OUTPATIENT)
Dept: FAMILY MEDICINE CLINIC | Age: 84
End: 2019-05-08

## 2019-05-08 NOTE — TELEPHONE ENCOUNTER
Contacted patient and scheduled appointment for 5/9/2019 @ 9:15am. Patient verbalized understanding and tolerated well.

## 2019-05-08 NOTE — TELEPHONE ENCOUNTER
Patient called requesting an earlier appointment than 5/21/19, patient needs ER follow up. Informed patient there is no openings until June, patient got very upset stating he needs to be seen now. Please advise, thank you.

## 2019-05-09 ENCOUNTER — OFFICE VISIT (OUTPATIENT)
Dept: FAMILY MEDICINE CLINIC | Age: 84
End: 2019-05-09

## 2019-05-09 VITALS
OXYGEN SATURATION: 97 % | HEIGHT: 66 IN | SYSTOLIC BLOOD PRESSURE: 98 MMHG | TEMPERATURE: 95.7 F | BODY MASS INDEX: 19.61 KG/M2 | RESPIRATION RATE: 18 BRPM | WEIGHT: 122 LBS | DIASTOLIC BLOOD PRESSURE: 62 MMHG | HEART RATE: 66 BPM

## 2019-05-09 DIAGNOSIS — Z98.890 S/P EXPLORATORY LAPAROTOMY: Primary | ICD-10-CM

## 2019-05-09 DIAGNOSIS — K57.80 PERFORATION OF INTESTINE DUE TO DIVERTICULITIS OF GASTROINTESTINAL TRACT: ICD-10-CM

## 2019-05-09 DIAGNOSIS — H10.32 ACUTE BACTERIAL CONJUNCTIVITIS OF LEFT EYE: ICD-10-CM

## 2019-05-09 DIAGNOSIS — E78.00 HYPERCHOLESTEROLEMIA: ICD-10-CM

## 2019-05-09 DIAGNOSIS — E11.65 TYPE 2 DIABETES MELLITUS WITH HYPERGLYCEMIA, WITHOUT LONG-TERM CURRENT USE OF INSULIN (HCC): ICD-10-CM

## 2019-05-09 DIAGNOSIS — R63.4 WEIGHT LOSS: ICD-10-CM

## 2019-05-09 RX ORDER — MEGESTROL ACETATE 20 MG/1
20 TABLET ORAL DAILY
Qty: 30 TAB | Refills: 2 | Status: SHIPPED | OUTPATIENT
Start: 2019-05-09 | End: 2019-08-14 | Stop reason: SDUPTHER

## 2019-05-09 RX ORDER — NEOMYCIN/POLYMYXIN B/HYDROCORT 3.5-10K-1
1 SUSPENSION, DROPS(FINAL DOSAGE FORM)(ML) OPHTHALMIC (EYE) 4 TIMES DAILY
Qty: 1 BOTTLE | Refills: 1 | Status: SHIPPED | OUTPATIENT
Start: 2019-05-09 | End: 2020-08-14

## 2019-05-09 RX ORDER — PRAVASTATIN SODIUM 40 MG/1
40 TABLET ORAL
Qty: 90 TAB | Refills: 3 | Status: SHIPPED | OUTPATIENT
Start: 2019-05-09 | End: 2022-03-18 | Stop reason: SDUPTHER

## 2019-05-09 NOTE — PROGRESS NOTES
Mavis Pierce is a 81 y.o.  male and presents with Chief Complaint Patient presents with  
[facility] Follow Up  
 Medication Refill  
  pravastatin, test strips, eye ointment  Diabetes Subjective: 
Mr. Cruzito Severino returns s/p ex lap for perforated diverticulum. He has been healing well. He followed up with surgery. He c/o red eye which has been present for a day Diabetes Mellitus: He has diabetes mellitus, and  hypertension and hyperlipidemia. Diabetic ROS - medication compliance: compliant all of the time, diabetic diet compliance: compliant most of the time. He has just used insulin as needed Lab review: labs reviewed, I note that glycosylated hemoglobin abnormal.  
 
ROS Constitutional: Negative for fever. HENT: Negative for sore throat. Eyes: Negative for visual disturbance. + red eye Respiratory: Positive for shortness of breath. Negative for cough and wheezing. Cardiovascular: Negative for chest pain. Gastrointestinal: Negative for abdominal pain. Endocrine: Negative for polyuria. Genitourinary: Negative for difficulty urinating. Musculoskeletal: Negative for gait problem. Skin: Negative for rash. Allergic/Immunologic: Negative for immunocompromised state. Neurological: Negative for syncope. Psychiatric/Behavioral: Negative for sleep disturbance. All other systems reviewed and are negative. Objective: 
Vitals:  
 05/09/19 5798 BP: 98/62 Pulse: 66 Resp: 18 Temp: 95.7 °F (35.4 °C) TempSrc: Oral  
SpO2: 97% Weight: 122 lb (55.3 kg) Height: 5' 6\" (1.676 m) PainSc:   0 - No pain  
 
 
alert, well appearing, and in no distress, oriented to person, place, and time and normal appearing weight Mental status - normal mood, behavior, speech, dress, motor activity, and thought processes Chest - clear to auscultation, no wheezes, rales or rhonchi, symmetric air entry Heart - normal rate, regular rhythm, normal S1, S2, no murmurs, rubs, clicks or gallops Abdomen - soft, nontender, nondistended, no masses or organomegaly 
tenderness noted right lower abdomen Extremities - peripheral pulses normal, no pedal edema, no clubbing or cyanosis Skin - normal coloration and turgor, no rashes, no suspicious skin lesions noted Diabetic foot exam:  
 
Left Foot: 
 Visual Exam: normal  
 Pulse DP: 2+ (normal) Filament test: normal sensation Right Foot: 
 Visual Exam: normal  
 Pulse DP: 2+ (normal) Filament test: normal sensation Assessment/Plan:   
1. S/P exploratory laparotomy Healing well 2. Perforation of intestine due to diverticulitis of gastrointestinal tract ([facility] [address] F/u with gastroenterology 3. Type 2 diabetes mellitus with hyperglycemia, without long-term current use of insulin ([facility]) Goal hgb a1c <7 
- HM DIABETES FOOT EXAM 
- glucose blood VI test strips (ONETOUCH VERIO) strip; Test blood glucose twice a day  Dispense: 100 Strip; Refill: 11 
 
4. Hypercholesterolemia Statin therapy - pravastatin (PRAVACHOL) 40 mg tablet; Take 1 Tab by mouth nightly. Dispense: 90 Tab; Refill: 3 
 
5. Acute bacterial conjunctivitis of left eye Cool compress and abx 
- neomycin-polymyxin-hc (CORTISPORIN) 3.5-10,000-10 mg-unit-mg/mL ophthalmic suspension; Administer 1 Drop to both eyes four (4) times daily. Dispense: 1 Bottle; Refill: 1 6. Weight loss 
 
- megestrol (MEGACE) 20 mg tablet; Take 1 Tab by mouth daily. Dispense: 30 Tab; Refill: 2 Lab review: labs reviewed, I note that glycosylated hemoglobin abnormal 
 
 
I have discussed the diagnosis with the patient and the intended plan as seen in the above orders. The patient has received an after-visit summary and questions were answered concerning future plans. I have discussed medication side effects and warnings with the patient as well.  I have reviewed the plan of care with the patient, accepted their input and they are in agreement with the treatment goals.

## 2019-05-09 NOTE — PROGRESS NOTES
Chief Complaint Patient presents with  ED Follow-up  Medication Refill  
  pravastatin, test strips, eye ointment 1. Have you been to the ER, urgent care clinic since your last visit? Hospitalized since your last visit? Yes 4/4/19-4/9/19- [facility]- Perforated diverticulum. 4/15/19-[facility]- Constipation. 4/18/19-[facility]- SOB 2. Have you seen or consulted any other health care providers outside of the [address] [place] [address] since your last visit? Include any pap smears or colon screening.  No

## 2019-06-26 ENCOUNTER — TELEPHONE (OUTPATIENT)
Dept: FAMILY MEDICINE CLINIC | Age: 84
End: 2019-06-26

## 2019-06-26 NOTE — TELEPHONE ENCOUNTER
Patient requesting to be squeezed in asap, he just recently had surgery and needs a follow up. Offered patient next available but declined. Please advise, thank you.

## 2019-06-27 NOTE — TELEPHONE ENCOUNTER
Called patient. Patient states he is having stomach pain that hasn't gone away since surgery. Nurse offered appointment June 28th at 3pm. Patient accepted appointment.  Awaiting arrival.

## 2019-06-28 ENCOUNTER — OFFICE VISIT (OUTPATIENT)
Dept: FAMILY MEDICINE CLINIC | Age: 84
End: 2019-06-28

## 2019-06-28 VITALS
BODY MASS INDEX: 20.86 KG/M2 | RESPIRATION RATE: 16 BRPM | TEMPERATURE: 98.4 F | SYSTOLIC BLOOD PRESSURE: 116 MMHG | DIASTOLIC BLOOD PRESSURE: 66 MMHG | WEIGHT: 129.8 LBS | OXYGEN SATURATION: 97 % | HEIGHT: 66 IN | HEART RATE: 82 BPM

## 2019-06-28 DIAGNOSIS — Z28.21 REFUSED TETANUS TOXOID: ICD-10-CM

## 2019-06-28 DIAGNOSIS — Z28.21 REFUSED PNEUMOCOCCAL VACCINATION: Primary | ICD-10-CM

## 2019-06-28 DIAGNOSIS — E11.65 TYPE 2 DIABETES MELLITUS WITH HYPERGLYCEMIA, WITHOUT LONG-TERM CURRENT USE OF INSULIN (HCC): ICD-10-CM

## 2019-06-28 DIAGNOSIS — Z98.890 S/P EXPLORATORY LAPAROTOMY: ICD-10-CM

## 2019-06-28 LAB — HBA1C MFR BLD HPLC: 6.9 %

## 2019-06-28 RX ORDER — LANCETS 33 GAUGE
EACH MISCELLANEOUS
Qty: 100 LANCET | Refills: 4 | Status: SHIPPED | OUTPATIENT
Start: 2019-06-28 | End: 2022-09-08 | Stop reason: ALTCHOICE

## 2019-06-28 NOTE — PROGRESS NOTES
Lucian eVra. is a 81 y.o.  male and presents with    Chief Complaint   Patient presents with    Abdominal Pain    Medication Refill           Subjective:  Diabetes Mellitus:  Mr. Virk Cousin has diabetes mellitus, and  hypertension and hyperlipidemia. Diabetic ROS - medication compliance: compliant all of the time, diabetic diet compliance: compliant most of the time. He has been using levemir 10 units at bedtime and his glucose runs 140-170  Lab review: labs reviewed, I note that glycosylated hemoglobin abnormal.      ROS   Constitutional: Negative for fever. HENT: Negative for sore throat.    Eyes: Negative for visual disturbance. + red eye  Respiratory: No shortness of breath. Negative for cough and wheezing.    Cardiovascular: Negative for chest pain. Gastrointestinal: Negative for abdominal pain. Endocrine: Negative for polyuria. Genitourinary: Negative for difficulty urinating. Musculoskeletal: Negative for gait problem. Skin: Negative for rash. Allergic/Immunologic: Negative for immunocompromised state. Neurological: Negative for syncope.    Psychiatric/Behavioral: Negative for sleep disturbance.      All other systems reviewed and are negative    Objective:  Vitals:    06/28/19 1514   BP: 116/66   Pulse: 82   Resp: 16   Temp: 98.4 °F (36.9 °C)   TempSrc: Oral   SpO2: 97%   Weight: 129 lb 12.8 oz (58.9 kg)   Height: 5' 6\" (1.676 m)   PainSc:   0 - No pain     alert, well appearing, and in no distress, oriented to person, place, and time and normal appearing weight  Mental status - normal mood, behavior, speech, dress, motor activity, and thought processes  Chest - clear to auscultation, no wheezes, rales or rhonchi, symmetric air entry  Heart - normal rate, regular rhythm, normal S1, S2, no murmurs, rubs, clicks or gallops  Abdomen - soft, nontender, nondistended, no masses or organomegaly  tenderness noted right lower abdomen  Extremities - peripheral pulses normal, no pedal edema, no clubbing or cyanosis      LABS   hgb a1c 6.9      Assessment/Plan:    1. Refused pneumococcal vaccination      2. Refused tetanus toxoid      3. Type 2 diabetes mellitus with hyperglycemia, without long-term current use of insulin ([facility])  Goal hgb a1c <7; continue to monitor blood glucose and adjust as needed to maintain   - AMB POC HEMOGLOBIN C3P  - ONE TOUCH DELICA 33 gauge misc; Test blood glucose 3 times a day  Dispense: 100 Lancet; Refill: 4  - insulin detemir U-100 (LEVEMIR FLEXTOUCH) 100 unit/mL (3 mL) inpn; 15 Units by SubCUTAneous route nightly. Dispense: 15 Pen; Refill: 4    4. S/P exploratory laparotomy  F/u with surgery      Lab review: labs reviewed, I note that glycosylated hemoglobin mildly abnormal but acceptable      I have discussed the diagnosis with the patient and the intended plan as seen in the above orders. The patient has received an after-visit summary and questions were answered concerning future plans. I have discussed medication side effects and warnings with the patient as well. I have reviewed the plan of care with the patient, accepted their input and they are in agreement with the treatment goals. Wilmar Lorenzo

## 2019-06-28 NOTE — PROGRESS NOTES
Chief Complaint   Patient presents with    Abdominal Pain    Medication Refill       1. Have you been to the ER, urgent care clinic since your last visit? Hospitalized since your last visit? No    2. Have you seen or consulted any other health care providers outside of the [address] [place] [address] since your last visit? Include any pap smears or colon screening.  No

## 2019-07-10 RX ORDER — TRIAMCINOLONE ACETONIDE 1 MG/G
CREAM TOPICAL
Qty: 454 G | Refills: 1 | Status: SHIPPED | OUTPATIENT
Start: 2019-07-10 | End: 2020-08-14

## 2019-08-09 ENCOUNTER — OFFICE VISIT (OUTPATIENT)
Dept: FAMILY MEDICINE CLINIC | Age: 84
End: 2019-08-09

## 2019-08-09 DIAGNOSIS — Z53.21 PATIENT LEFT WITHOUT BEING SEEN: Primary | ICD-10-CM

## 2019-08-14 ENCOUNTER — HOSPITAL ENCOUNTER (OUTPATIENT)
Dept: LAB | Age: 84
Discharge: HOME | End: 2019-08-14
Payer: MEDICARE

## 2019-08-14 ENCOUNTER — OFFICE VISIT (OUTPATIENT)
Dept: FAMILY MEDICINE CLINIC | Age: 84
End: 2019-08-14

## 2019-08-14 VITALS
TEMPERATURE: 96.6 F | RESPIRATION RATE: 16 BRPM | OXYGEN SATURATION: 98 % | HEIGHT: 66 IN | BODY MASS INDEX: 20.93 KG/M2 | WEIGHT: 130.2 LBS | DIASTOLIC BLOOD PRESSURE: 77 MMHG | HEART RATE: 68 BPM | SYSTOLIC BLOOD PRESSURE: 129 MMHG

## 2019-08-14 DIAGNOSIS — R29.898 LEFT LEG WEAKNESS: ICD-10-CM

## 2019-08-14 DIAGNOSIS — R63.4 WEIGHT LOSS: ICD-10-CM

## 2019-08-14 DIAGNOSIS — R68.82 DECREASED LIBIDO: Primary | ICD-10-CM

## 2019-08-14 DIAGNOSIS — E78.00 HYPERCHOLESTEROLEMIA: ICD-10-CM

## 2019-08-14 DIAGNOSIS — E11.65 TYPE 2 DIABETES MELLITUS WITH HYPERGLYCEMIA, WITHOUT LONG-TERM CURRENT USE OF INSULIN (HCC): ICD-10-CM

## 2019-08-14 PROCEDURE — 84403 ASSAY OF TOTAL TESTOSTERONE: CPT

## 2019-08-14 PROCEDURE — 36415 COLL VENOUS BLD VENIPUNCTURE: CPT

## 2019-08-14 RX ORDER — MEGESTROL ACETATE 20 MG/1
20 TABLET ORAL DAILY
Qty: 30 TAB | Refills: 2 | Status: SHIPPED | OUTPATIENT
Start: 2019-08-14 | End: 2020-07-02 | Stop reason: SDUPTHER

## 2019-08-14 NOTE — PROGRESS NOTES
Melinda Ashby. is a 81 y.o.  male and presents with    Chief Complaint   Patient presents with    Medication Evaluation    Extremity Weakness     left leg     Fatigue     Subjective:  Mr. Connie Wilson c/o left leg weakness for the past couple months. He has not mentioned this in the past.  He uses a cane; he has not had a fall. He denies paresthesias. There is pain associated with the weakness. He uses ibuprofen and this provides relief. He denies change in size of legs in comparison. He c/o low libido. He has been prescribed viagra but has not used this medication. He has occasional morning erection. Diabetes Mellitus:  Mr. Jami Curiel diabetes mellitus, and  hypertension and hyperlipidemia. Diabetic ROS - medication compliance: compliant all of the time, diabetic diet compliance: compliant most of the time. He has been using levemir 10 units at bedtime and his glucose runs 140-170  Lab review: labs reviewed, I note that glycosylated hemoglobin abnormal.      ROS   Constitutional: Negative for fever. HENT: Negative for sore throat.    Eyes: Negative for visual disturbance. + red eye  Respiratory: No shortness of breath. Negative for cough and wheezing.    Cardiovascular: Negative for chest pain. Gastrointestinal: Negative for abdominal pain. Endocrine: Negative for polyuria. Genitourinary: Negative for difficulty urinating. Musculoskeletal: Negative for gait problem. Skin: Negative for rash. Allergic/Immunologic: Negative for immunocompromised state. Neurological: Negative for syncope.    Psychiatric/Behavioral: Negative for sleep disturbance.      All other systems reviewed and are negative    Objective:  Vitals:    08/14/19 0818   BP: 129/77   Pulse: 68   Resp: 16   Temp: 96.6 °F (35.9 °C)   TempSrc: Oral   SpO2: 98%   Weight: 130 lb 3.2 oz (59.1 kg)   Height: 5' 6\" (1.676 m)   PainSc:   0 - No pain     alert, well appearing, and in no distress, oriented to person, place, and time and normal appearing weight  Mental status - normal mood, behavior, speech, dress, motor activity, and thought processes  Chest - clear to auscultation, no wheezes, rales or rhonchi, symmetric air entry  Heart - normal rate, regular rhythm, normal S1, S2, no murmurs, rubs, clicks or gallops  Abdomen - soft, nontender, nondistended, no masses or organomegaly  tenderness noted right lower abdomen  Extremities - peripheral pulses normal, no pedal edema, no clubbing or cyanosis  Neuro - left lower extremity 4/5 strenghth  Unsteady gait using cane to ambulate  LABS   hgb a1c 6.9    Assessment/Plan:    1. Decreased libido  Assess for level; he has intermittent erection  - TESTOSTERONE, TOTAL; Future    2. Left leg weakness  Recommend physical therapy but pt declined    3. Type 2 diabetes mellitus with hyperglycemia, without long-term current use of insulin ([facility])  Goal hgb a1c <7    4. Hypercholesterolemia  Continue statin    5. Weight loss  Continue treatment  - megestrol (MEGACE) 20 mg tablet; Take 1 Tab by mouth daily. Dispense: 30 Tab; Refill: 2      Lab review: orders written for new lab studies as appropriate; see orders      I have discussed the diagnosis with the patient and the intended plan as seen in the above orders. The patient has received an after-visit summary and questions were answered concerning future plans. I have discussed medication side effects and warnings with the patient as well. I have reviewed the plan of care with the patient, accepted their input and they are in agreement with the treatment goals.

## 2019-08-14 NOTE — PROGRESS NOTES
Chief Complaint   Patient presents with    Medication Evaluation    Extremity Weakness     left leg     Fatigue     1. Have you been to the ER, urgent care clinic since your last visit? Hospitalized since your last visit? No    2. Have you seen or consulted any other health care providers outside of the [address] [place] [address] since your last visit? Include any pap smears or colon screening.  No

## 2019-08-15 LAB — TESTOST SERPL-MCNC: 444 NG/DL (ref 264–916)

## 2019-08-26 ENCOUNTER — APPOINTMENT (OUTPATIENT)
Dept: GENERAL RADIOLOGY | Age: 84
End: 2019-08-26
Attending: EMERGENCY MEDICINE
Payer: MEDICARE

## 2019-08-26 ENCOUNTER — HOSPITAL ENCOUNTER (EMERGENCY)
Age: 84
Discharge: HOME | End: 2019-08-26
Attending: EMERGENCY MEDICINE
Payer: MEDICARE

## 2019-08-26 VITALS
HEIGHT: 67 IN | TEMPERATURE: 98 F | HEART RATE: 64 BPM | RESPIRATION RATE: 18 BRPM | OXYGEN SATURATION: 97 % | SYSTOLIC BLOOD PRESSURE: 126 MMHG | DIASTOLIC BLOOD PRESSURE: 60 MMHG | BODY MASS INDEX: 18.36 KG/M2 | WEIGHT: 117 LBS

## 2019-08-26 DIAGNOSIS — M25.552 PAIN OF LEFT HIP JOINT: Primary | ICD-10-CM

## 2019-08-26 DIAGNOSIS — M19.90 ARTHRITIS: ICD-10-CM

## 2019-08-26 PROCEDURE — 99282 EMERGENCY DEPT VISIT SF MDM: CPT

## 2019-08-26 PROCEDURE — 73502 X-RAY EXAM HIP UNI 2-3 VIEWS: CPT

## 2019-08-26 NOTE — ED TRIAGE NOTES
L hip pain, intermittent, for 3 months, denies injury/trauma. Has taken aleve with no relief.  Using a cane to ambulate as the L leg gives out

## 2019-08-26 NOTE — DISCHARGE INSTRUCTIONS
Patient Education        Arthritis: Care Instructions  Your Care Instructions  Arthritis, also called osteoarthritis, is a breakdown of the cartilage that cushions your joints. When the cartilage wears down, your bones rub against each other. This causes pain and stiffness. Many people have some arthritis as they age. Arthritis most often affects the joints of the spine, hands, hips, knees, or feet. You can take simple measures to protect your joints, ease your pain, and help you stay active. Follow-up care is a key part of your treatment and safety. Be sure to make and go to all appointments, and call your doctor if you are having problems. It's also a good idea to know your test results and keep a list of the medicines you take. How can you care for yourself at home? · Stay at a healthy weight. Being overweight puts extra strain on your joints. · Talk to your doctor or physical therapist about exercises that will help ease joint pain. ? Stretch. You may enjoy gentle forms of yoga to help keep your joints and muscles flexible. ? Walk instead of jog. Other types of exercise that are less stressful on the joints include riding a bicycle, swimming, ray chi, or water exercise. ? Lift weights. Strong muscles help reduce stress on your joints. Stronger thigh muscles, for example, take some of the stress off of the knees and hips. Learn the right way to lift weights so you do not make joint pain worse. · Take your medicines exactly as prescribed. Call your doctor if you think you are having a problem with your medicine. · Take pain medicines exactly as directed. ? If the doctor gave you a prescription medicine for pain, take it as prescribed. ? If you are not taking a prescription pain medicine, ask your doctor if you can take an over-the-counter medicine. · Use a cane, crutch, walker, or another device if you need help to get around. These can help rest your joints.  You also can use other things to make life easier, such as a higher toilet seat and padded handles on kitchen utensils. · Do not sit in low chairs, which can make it hard to get up. · Put heat or cold on your sore joints as needed. Use whichever helps you most. You also can take turns with hot and cold packs. ? Apply heat 2 or 3 times a day for 20 to 30 minutes--using a heating pad, hot shower, or hot pack--to relieve pain and stiffness. ? Put ice or a cold pack on your sore joint for 10 to 20 minutes at a time. Put a thin cloth between the ice and your skin. When should you call for help? Call your doctor now or seek immediate medical care if:    · You have sudden swelling, warmth, or pain in any joint.     · You have joint pain and a fever or rash.     · You have such bad pain that you cannot use a joint.    Watch closely for changes in your health, and be sure to contact your doctor if:    · You have mild joint symptoms that continue even with more than 6 weeks of care at home.     · You have stomach pain or other problems with your medicine. Where can you learn more? Go to http://luciano-chip.info/. Enter T777 in the search box to learn more about \"Arthritis: Care Instructions. \"  Current as of: April 1, 2019  Content Version: 12.1  © 7146-3386 [employer]. Care instructions adapted under license by [employer] (which disclaims liability or warranty for this information). If you have questions about a medical condition or this instruction, always ask your healthcare professional. [place] [address] any warranty or liability for your use of this information. Patient Education        The Hip Joint: Anatomy Sketch    Current as of: September 20, 2018  Content Version: 12.1  © 7892-0695 [employer]. Care instructions adapted under license by [employer] (which disclaims liability or warranty for this information).  If you have questions about a medical condition or this instruction, always ask your healthcare professional. [place] [address] any warranty or liability for your use of this information.
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
## 2019-09-18 ENCOUNTER — OFFICE VISIT (OUTPATIENT)
Dept: SURGERY | Age: 84
End: 2019-09-18

## 2019-09-18 VITALS
HEART RATE: 58 BPM | HEIGHT: 67 IN | WEIGHT: 129 LBS | TEMPERATURE: 97 F | SYSTOLIC BLOOD PRESSURE: 115 MMHG | OXYGEN SATURATION: 97 % | BODY MASS INDEX: 20.25 KG/M2 | DIASTOLIC BLOOD PRESSURE: 88 MMHG

## 2019-09-18 DIAGNOSIS — G89.18 POSTOPERATIVE PAIN: Primary | ICD-10-CM
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
## 2019-09-23 ENCOUNTER — HOSPITAL ENCOUNTER (OUTPATIENT)
Dept: LAB | Age: 84
Discharge: HOME | End: 2019-09-23
Payer: MEDICARE

## 2019-09-23 ENCOUNTER — OFFICE VISIT (OUTPATIENT)
Dept: FAMILY MEDICINE CLINIC | Age: 84
End: 2019-09-23

## 2019-09-23 VITALS
DIASTOLIC BLOOD PRESSURE: 80 MMHG | BODY MASS INDEX: 20.89 KG/M2 | WEIGHT: 130 LBS | OXYGEN SATURATION: 99 % | SYSTOLIC BLOOD PRESSURE: 140 MMHG | HEART RATE: 60 BPM | RESPIRATION RATE: 18 BRPM | HEIGHT: 66 IN | TEMPERATURE: 97 F

## 2019-09-23 DIAGNOSIS — E78.00 HYPERCHOLESTEROLEMIA: ICD-10-CM

## 2019-09-23 DIAGNOSIS — R31.0 GROSS HEMATURIA: Primary | ICD-10-CM

## 2019-09-23 DIAGNOSIS — E11.65 TYPE 2 DIABETES MELLITUS WITH HYPERGLYCEMIA, WITHOUT LONG-TERM CURRENT USE OF INSULIN (HCC): ICD-10-CM

## 2019-09-23 DIAGNOSIS — R31.0 GROSS HEMATURIA: ICD-10-CM

## 2019-09-23 PROCEDURE — 87086 URINE CULTURE/COLONY COUNT: CPT

## 2019-09-23 RX ORDER — CIPROFLOXACIN 500 MG/1
500 TABLET ORAL 2 TIMES DAILY
Qty: 20 TAB | Refills: 0 | Status: SHIPPED | OUTPATIENT
Start: 2019-09-23 | End: 2019-09-23 | Stop reason: SDUPTHER

## 2019-09-23 RX ORDER — CIPROFLOXACIN 500 MG/1
500 TABLET ORAL 2 TIMES DAILY
Qty: 20 TAB | Refills: 0 | Status: SHIPPED | OUTPATIENT
Start: 2019-09-23 | End: 2019-10-03

## 2019-09-23 NOTE — PROGRESS NOTES
1. Have you been to the ER, urgent care clinic since your last visit? Hospitalized since your last visit? No    2. Have you seen or consulted any other health care providers outside of the [address] [place] [address] since your last visit? Include any pap smears or colon screening.  No

## 2019-09-23 NOTE — PROGRESS NOTES
Jesenia Richardson. is a 81 y.o.  male and presents with    Chief Complaint   Patient presents with    Blood in Urine     Subjective:  Mr. Caro Corrales c/o hematuria X 3 episodes. He c/o gross blood; he denies fever or chills; he denies lightheadedness or dizziness. He denies new sexual contact. He denies back pain. He c/o increased frequency of urination. He is followed by dr. Liam Fortune in urology and has f/u scheduled 11/18. He reports that he is not monitoring his blood glucose.       Diabetes Mellitus:  Mr. Dina Young diabetes mellitus, and  hypertension and hyperlipidemia. Diabetic ROS - medication compliance: compliant all of the time, diabetic diet compliance: compliant most of the time. He has been using levemir 10 units at bedtime and his glucose runs 140-170  Lab review: labs reviewed, I note that glycosylated hemoglobin abnormal.      ROS   Constitutional: Negative for fever. HENT: Negative for sore throat.    Eyes: Negative for visual disturbance. + red eye  Respiratory: No shortness of breath. Negative for cough and wheezing.    Cardiovascular: Negative for chest pain. Gastrointestinal: Negative for abdominal pain. Endocrine: Negative for polyuria. Musculoskeletal: Negative for gait problem. Skin: Negative for rash. Allergic/Immunologic: Negative for immunocompromised state. Neurological: Negative for syncope. Psychiatric/Behavioral: Negative for sleep disturbance.   All other systems reviewed and are negative.     Objective:  Vitals:    09/23/19 1214   BP: 140/80   Pulse: 60   Resp: 18   Temp: 97 °F (36.1 °C)   SpO2: 99%   Weight: 130 lb (59 kg)   Height: 5' 6\" (1.676 m)   PainSc:   0 - No pain     alert, well appearing, and in no distress, oriented to person, place, and time and normal appearing weight  Mental status - normal mood, behavior, speech, dress, motor activity, and thought processes  Chest - clear to auscultation, no wheezes, rales or rhonchi, symmetric air entry  Heart - normal rate, regular rhythm, normal S1, S2, no murmurs, rubs, clicks or gallops  Abdomen - soft, nontender, nondistended, no masses or organomegaly  tenderness noted right lower abdomen  Extremities - peripheral pulses normal, no pedal edema, no clubbing or cyanosis  Neuro - left lower extremity 4/5 strenghth  Unsteady gait using cane to ambulate    LABS   Urine culture pending    Assessment/Plan:    1. Gross hematuria  Treat initially for infection; f/u with urology  - CULTURE, URINE; Future  - ciprofloxacin HCl (CIPRO) 500 mg tablet; Take 1 Tab by mouth two (2) times a day for 10 days. Dispense: 20 Tab; Refill: 0    2. Type 2 diabetes mellitus with hyperglycemia, without long-term current use of insulin ([facility])  Goal hgb a1c <7    3. Hypercholesterolemia  Continue current medications      Lab review: orders written for new lab studies as appropriate; see orders      I have discussed the diagnosis with the patient and the intended plan as seen in the above orders. The patient has received an after-visit summary and questions were answered concerning future plans. I have discussed medication side effects and warnings with the patient as well. I have reviewed the plan of care with the patient, accepted their input and they are in agreement with the treatment goals.

## 2019-09-25 LAB
BACTERIA SPEC CULT: NORMAL
SERVICE CMNT-IMP: NORMAL

## 2019-12-12 ENCOUNTER — OFFICE VISIT (OUTPATIENT)
Dept: FAMILY MEDICINE CLINIC | Age: 84
End: 2019-12-12

## 2019-12-12 ENCOUNTER — HOSPITAL ENCOUNTER (OUTPATIENT)
Dept: LAB | Age: 84
Discharge: HOME | End: 2019-12-12
Payer: MEDICARE

## 2019-12-12 VITALS
TEMPERATURE: 97.3 F | OXYGEN SATURATION: 98 % | BODY MASS INDEX: 21.65 KG/M2 | SYSTOLIC BLOOD PRESSURE: 128 MMHG | DIASTOLIC BLOOD PRESSURE: 69 MMHG | HEART RATE: 83 BPM | HEIGHT: 66 IN | WEIGHT: 134.7 LBS | RESPIRATION RATE: 16 BRPM

## 2019-12-12 DIAGNOSIS — E78.00 HYPERCHOLESTEROLEMIA: ICD-10-CM

## 2019-12-12 DIAGNOSIS — R31.0 GROSS HEMATURIA: ICD-10-CM

## 2019-12-12 DIAGNOSIS — E11.65 TYPE 2 DIABETES MELLITUS WITH HYPERGLYCEMIA, WITHOUT LONG-TERM CURRENT USE OF INSULIN (HCC): Primary | ICD-10-CM

## 2019-12-12 DIAGNOSIS — E11.65 TYPE 2 DIABETES MELLITUS WITH HYPERGLYCEMIA, WITHOUT LONG-TERM CURRENT USE OF INSULIN (HCC): ICD-10-CM

## 2019-12-12 LAB
EST. AVERAGE GLUCOSE BLD GHB EST-MCNC: 203 MG/DL
HBA1C MFR BLD: 8.7 % (ref 4.2–5.6)

## 2019-12-12 PROCEDURE — 83036 HEMOGLOBIN GLYCOSYLATED A1C: CPT

## 2019-12-12 PROCEDURE — 36415 COLL VENOUS BLD VENIPUNCTURE: CPT

## 2019-12-12 NOTE — PROGRESS NOTES
1. Have you been to the ER, urgent care clinic since your last visit? Hospitalized since your last visit? No    2. Have you seen or consulted any other health care providers outside of the [address] [place] [address] since your last visit? Include any pap smears or colon screening.  No

## 2019-12-12 NOTE — PROGRESS NOTES
Mily Walsh is a 81 y.o.  male and presents with    Chief Complaint   Patient presents with    Diabetes    Medication Refill     Subjective:  Diabetes Mellitus:  Mr. Otf Pittman diabetes mellitus, and  hyperlipidemia. Diabetic ROS - medication compliance: compliant all of the time, diabetic diet compliance: compliant most of the time,  Lab review: labs reviewed, I note that glycosylated hemoglobin abnormal.   He is monitoring his blood glucose in the morning and today his level was 214; he has ranged into the 200s intermittently; range:   101-230. Average glucose is 170; he has increased his bread intake. ROS   Constitutional: Negative for fever. HENT: Negative for sore throat.    Eyes: Negative for visual disturbance. + red eye  Respiratory: No shortness of breath. Negative for cough and wheezing.    Cardiovascular: Negative for chest pain. Gastrointestinal: Negative for abdominal pain. Endocrine: Negative for polyuria. Musculoskeletal: Negative for gait problem. Skin: Negative for rash. Allergic/Immunologic: Negative for immunocompromised state. Neurological: Negative for syncope. Psychiatric/Behavioral: Negative for sleep disturbance.   All other systems reviewed and are negative.     Objective:  Vitals:    12/12/19 1006   BP: 128/69   Pulse: 83   Resp: 16   Temp: 97.3 °F (36.3 °C)   TempSrc: Oral   SpO2: 98%   Weight: 134 lb 11.2 oz (61.1 kg)   Height: 5' 6\" (1.676 m)   PainSc:   0 - No pain     alert, well appearing, and in no distress, oriented to person, place, and time and normal appearing weight  Mental status - normal mood, behavior, speech, dress, motor activity, and thought processes  Chest - clear to auscultation, no wheezes, rales or rhonchi, symmetric air entry  Heart - normal rate, regular rhythm, normal S1, S2, no murmurs, rubs, clicks or gallops  Abdomen - soft, nontender, nondistended, no masses or organomegaly  tenderness noted right lower abdomen  Extremities - peripheral pulses normal, no pedal edema, no clubbing or cyanosis  Neuro - left lower extremity 4/5 [facility]    Assessment/Plan:    1. Type 2 diabetes mellitus with hyperglycemia, without long-term current use of insulin ([facility])  Goal hgb a1c <7  - HEMOGLOBIN A1C WITH EAG; Future    2. Hypercholesterolemia  Continue statin    3. Gross hematuria  F/u with urology      Lab review: orders written for new lab studies as appropriate; see orders      I have discussed the diagnosis with the patient and the intended plan as seen in the above orders. The patient has received an after-visit summary and questions were answered concerning future plans. I have discussed medication side effects and warnings with the patient as well. I have reviewed the plan of care with the patient, accepted their input and they are in agreement with the treatment goals.

## 2020-06-25 ENCOUNTER — TELEPHONE (OUTPATIENT)
Dept: FAMILY MEDICINE CLINIC | Age: 85
End: 2020-06-25

## 2020-06-25 NOTE — TELEPHONE ENCOUNTER
Patient called in to make an appointment and I informed him per usual that we are currently not seeing any patient's in the office right now due to the virus and offered him the virtual appointments and he asked very rudely how he's supposed to be taken care of or diagnosed over the phone. I offered him once again to do just a regular telephone call if he didn't want to do the video call and he declined again and said he wanted to be seen in office. Please advise.

## 2020-07-02 ENCOUNTER — OFFICE VISIT (OUTPATIENT)
Dept: FAMILY MEDICINE CLINIC | Age: 85
End: 2020-07-02

## 2020-07-02 VITALS
OXYGEN SATURATION: 99 % | HEIGHT: 66 IN | RESPIRATION RATE: 16 BRPM | DIASTOLIC BLOOD PRESSURE: 75 MMHG | TEMPERATURE: 98.1 F | WEIGHT: 128 LBS | SYSTOLIC BLOOD PRESSURE: 113 MMHG | BODY MASS INDEX: 20.57 KG/M2 | HEART RATE: 86 BPM

## 2020-07-02 DIAGNOSIS — N52.01 ERECTILE DYSFUNCTION DUE TO ARTERIAL INSUFFICIENCY: ICD-10-CM

## 2020-07-02 DIAGNOSIS — R10.31 RIGHT LOWER QUADRANT ABDOMINAL PAIN: ICD-10-CM

## 2020-07-02 DIAGNOSIS — Z79.4 DIABETES MELLITUS DUE TO UNDERLYING CONDITION WITH HYPEROSMOLARITY WITHOUT COMA, WITH LONG-TERM CURRENT USE OF INSULIN (HCC): ICD-10-CM

## 2020-07-02 DIAGNOSIS — E78.00 HYPERCHOLESTEROLEMIA: ICD-10-CM

## 2020-07-02 DIAGNOSIS — R63.4 WEIGHT LOSS: ICD-10-CM

## 2020-07-02 DIAGNOSIS — E08.00 DIABETES MELLITUS DUE TO UNDERLYING CONDITION WITH HYPEROSMOLARITY WITHOUT COMA, WITH LONG-TERM CURRENT USE OF INSULIN (HCC): ICD-10-CM

## 2020-07-02 DIAGNOSIS — Z00.00 MEDICARE ANNUAL WELLNESS VISIT, INITIAL: Primary | ICD-10-CM

## 2020-07-02 DIAGNOSIS — Z71.89 ADVANCE CARE PLANNING: ICD-10-CM

## 2020-07-02 DIAGNOSIS — E11.65 TYPE 2 DIABETES MELLITUS WITH HYPERGLYCEMIA, WITHOUT LONG-TERM CURRENT USE OF INSULIN (HCC): ICD-10-CM

## 2020-07-02 LAB — HBA1C MFR BLD HPLC: 10.6 %

## 2020-07-02 RX ORDER — PEN NEEDLE, DIABETIC 30 GX3/16"
NEEDLE, DISPOSABLE MISCELLANEOUS
Qty: 90 PEN NEEDLE | Refills: 3 | Status: SHIPPED | OUTPATIENT
Start: 2020-07-02 | End: 2020-11-13 | Stop reason: SDUPTHER

## 2020-07-02 RX ORDER — PIOGLITAZONEHYDROCHLORIDE 45 MG/1
45 TABLET ORAL DAILY
Qty: 90 TAB | Refills: 3 | Status: SHIPPED | OUTPATIENT
Start: 2020-07-02 | End: 2020-07-17

## 2020-07-02 RX ORDER — MEGESTROL ACETATE 20 MG/1
20 TABLET ORAL DAILY
Qty: 30 TAB | Refills: 2 | Status: SHIPPED | OUTPATIENT
Start: 2020-07-02 | End: 2021-01-06 | Stop reason: ALTCHOICE

## 2020-07-02 NOTE — PROGRESS NOTES
(AWV) The Initial Medicare Annual Wellness Exam PROGRESS NOTE    This is an Initial Medicare Annual Wellness Exam (AWV)     I have reviewed the patient's medical history in detail and updated the computerized patient record. Junious Harada. is a 81 y.o.  male and presents for an annual wellness exam     ROS   Constitutional: Negative for fever and weight loss and night sweats  HENT: Negative for sore throat.    Eyes: Negative for visual disturbance. + red eye  Respiratory: No shortness of breath. Negative for cough and wheezing.    Cardiovascular: Negative for chest pain. Gastrointestinal: positive for abdominal pain which is localized to right inguinal area which is intermittent. He has intermittent loose stool  Endocrine: positive for polyuria. Musculoskeletal: Negative for gait problem. Skin: Negative for rash. Allergic/Immunologic: Negative for immunocompromised state. Neurological: Negative for syncope. Psychiatric/Behavioral: Negative for sleep disturbance.   All other systems reviewed and are negative. All other systems reviewed and are negative. History     Past Medical History:   Diagnosis Date    Abdominal pain     Acute cystitis with hematuria     Bladder mass     Bladder pain     BPH (benign prostatic hyperplasia)     BPH with obstruction/lower urinary tract symptoms     Chronic prostatitis     Diabetes ([address]     ED (erectile dysfunction)     Elevated PSA     Essential hypertension     Hematuria     History of UTI     Hypercholesterolemia     Nocturia     Urinary tract infection, site not specified     Urine leukocytes       Past Surgical History:   Procedure Laterality Date    ABDOMEN SURGERY PROC UNLISTED      stabbing    CYSTOSCOPY,RESEC EJACULATORY DUCT      HX LAPAROTOMY  04/04/2019    HX OTHER SURGICAL  01/21/2015    Dr. Antonio Noel RT Eye surgery    HX UROLOGICAL  2013    TURP x 3 (most recent 2013).     HX VITRECTOMY  04/06/2017    pars plana vitrectomy and membrane peel of left eye     Current Outpatient Medications   Medication Sig Dispense Refill    megestrol (MEGACE) 20 mg tablet Take 1 Tab by mouth daily. 30 Tab 2    empagliflozin (JARDIANCE) 25 mg tablet Take 1 Tab by mouth daily. 90 Tab 3    triamcinolone acetonide (KENALOG) 0.1 % topical cream apply A THIN AMOUNT to affected area twice a day 454 g 1    ONE TOUCH DELICA 33 gauge misc Test blood glucose 3 times a day 100 Lancet 4    insulin detemir U-100 (LEVEMIR FLEXTOUCH) 100 unit/mL (3 mL) inpn 15 Units by SubCUTAneous route nightly. 15 Pen 4    ONETOUCH VERIO SYSTEM misc       sildenafil citrate (VIAGRA) 100 mg tablet Take 1 Tab by mouth daily as needed (for ED. No more than 1 tab a day). 30 Tab 2    pravastatin (PRAVACHOL) 40 mg tablet Take 1 Tab by mouth nightly. 90 Tab 3    glucose blood VI test strips (ONETOUCH VERIO) strip Test blood glucose twice a day 100 Strip 11    neomycin-polymyxin-hc (CORTISPORIN) 3.5-10,000-10 mg-unit-mg/mL ophthalmic suspension Administer 1 Drop to both eyes four (4) times daily. 1 Bottle 1    finasteride (PROSCAR) 5 mg tablet take 1 tablet by mouth once daily 90 Tab 3    pioglitazone (ACTOS) 45 mg tablet Take 1 Tab by mouth daily.  80 Tab 3     No Known Allergies  Family History   Problem Relation Age of Onset    Diabetes Mother     Kidney Disease Sister      Social History     Tobacco Use    Smoking status: Former Smoker     Packs/day: 0.25     Years: 65.00     Pack years: 16.25     Types: Cigarettes    Smokeless tobacco: Never Used    Tobacco comment: he is cutting down   Substance Use Topics    Alcohol use: Yes     Frequency: Never     Comment: social     Patient Active Problem List   Diagnosis Code    Hematuria R31.9    Benign prostatic hyperplasia with lower urinary tract symptoms N40.1    Erectile dysfunction N52.9    Elevated PSA R97.20    Diabetes ([address] E11.9    BPH (benign prostatic hyperplasia) N40.0    Hypercholesterolemia E78.00    Type 2 diabetes with nephropathy (HCC) E11.21    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 · Little interest or pleasure in doing things? · Not at all. [0]  · Feeling down, depressed, or hopeless? · Not at all. [0]    Total Score: 0/6  PHQ-2 Assessment Scoring:   A score of 2 or more requires further screening with the PHQ-9    Alcohol Risk Factor Screening:     Men: On any occasion during the past 3 months, have you had more than 4 drinks containing alcohol? no  Do you average more than 14 drinks per week? no    Functional Ability and Level of Safety:     Hearing Loss    The patient needs further evaluation. Activities of Daily Living   Self-care. Requires assistance with: no ADLs    Fall Risk   No fall risk factors    Abuse Screen   Patient is not abused    Examination   Physical Examination  Vitals:    07/02/20 1613   BP: 113/75   Pulse: 86   Resp: 16   Temp: 98.1 °F (36.7 °C)   TempSrc: Oral   SpO2: 99%   Weight: 128 lb (58.1 kg)   Height: 5' 6\" (1.676 m)   PainSc:   0 - No pain      Body mass index is 20.66 kg/m².      Evaluation of Cognitive Function:  Mood/affect:good mood with appropriate affect  Appearance: well kempt  Family member/caregiver input: n/a    alert, well appearing, and in no distress, oriented to person, place, and time and normal appearing weight    Patient Care Team:  Rafal Ignacio MD as PCP - General (Family Practice)  Rafal Ignacio MD as PCP - [facility] Empaneled Provider  Angie Jacinto MD as Consulting Provider (General Surgery)  Katie Edwards MD (Urology)  Ce Balderas DO (Internal Medicine)  Latha Ross NP (Nurse Practitioner)  Sydni Alston DO (General Surgery)    End-of-life planning  Advanced Directive in the case than an injury or illness causes the patient to be unable to make health care decisions    Health Care Directive or Living Will: yes    Advice/Referrals/Counselling/Plan:   Education and counseling provided:  End-of-Life planning (with patient's consent)  Pneumococcal Vaccine  Influenza Vaccine  Medical nutrition therapy for individuals with diabetes or renal disease  Include in education list (weight loss, physical activity, smoking cessation, fall prevention, and nutrition)    ICD-10-CM ICD-9-CM    1. Medicare annual wellness visit, initial Z00.00 V70.0 [id number] InfluxDB   2. Advance care planning Z71.89 V65.49 ADVANCE CARE PLANNING FIRST 30 MINS   3. Diabetes mellitus due to underlying condition with hyperosmolarity without coma, with long-term current use of insulin ([facility]) E08.00 249.20  DIABETES FOOT EXAM    Z79.4 V58.67 pioglitazone (ACTOS) 45 mg tablet      AMB POC HEMOGLOBIN A1C   4. Hypercholesterolemia E78.00 272.0    5. Weight loss R63.4 783.21 megestroL (MEGACE) 20 mg tablet      CT ABD PELV W WO CONT   6. Erectile dysfunction due to arterial insufficiency N52.01 607.84    7. Right lower quadrant abdominal pain R10.31 789.03 CT ABD PELV W WO CONT   8. Type 2 diabetes mellitus with hyperglycemia, without long-term current use of insulin ([facility]) E11.65 250.00 insulin detemir U-100 (LEVEMIR FLEXTOUCH) 100 unit/mL (3 mL) inpn     790.29 Insulin Needles, Disposable, 31 gauge x 5/16\" ndle   . Brief written plan, checklist    I have discussed the diagnosis with the patient and the intended plan as seen in the above orders. The patient has received an after-visit summary and questions were answered concerning future plans. I have discussed medication side effects and warnings with the patient as well. I have reviewed the plan of care with the patient, accepted their input and they are in agreement with the treatment goals. ____________________________________________________________    Problem Assessment    for treatment of   Chief Complaint   Patient presents with    Diabetes    Benign Prostatic Hypertrophy    Weight Loss         SUBJECTIVE    Well Adult Physical   Patient here for a comprehensive physical exam.The patient reports problems - 1.  increased urinary frequency,   2.  diabetes:  Diabetic ROS - medication compliance: compliant all of the time, diabetic diet compliance: compliant most of the time,  Lab review: labs reviewed, I note that glycosylated hemoglobin minimally abnormal but acceptable   He is monitoring his blood glucose in the morning and today his level was 214; he has ranged into the 200s intermittently; range:   101-230. Average glucose is 170; he has increased his bread intake. 3.  prostate hypertrophy;   4.  he has lost 10 lbs over the past 6 months which was unintentional  Do you take any herbs or supplements that were not prescribed by a doctor? no Are you taking calcium supplements? no Are you taking aspirin daily? not applicable    Visit Vitals  /75 (BP 1 Location: Right arm, BP Patient Position: Sitting)   Pulse 86   Temp 98.1 °F (36.7 °C) (Oral)   Resp 16   Ht 5' 6\" (1.676 m)   Wt 128 lb (58.1 kg)   SpO2 99%   BMI 20.66 kg/m²     General:  Alert, cooperative, no distress, appears stated age. Head:  Normocephalic, without obvious abnormality, atraumatic. Eyes:  Conjunctivae/corneas clear. PERRL, EOMs intact. Ears:  Normal TMs and external ear canals both ears. Nose: Nares normal. Septum midline. Mucosa normal. No drainage or sinus tenderness. Throat: Lips, mucosa, and tongue normal. Teeth and gums normal.   Neck: Supple, symmetrical, trachea midline, no adenopathy, thyroid: no enlargement/tenderness/nodules   Back:   Symmetric, no curvature.  ROM normal. No CVA tenderness. Lungs:   Clear to auscultation bilaterally. Chest wall:  No tenderness or deformity. Heart:  Regular rate and rhythm, S1, S2 normal, no murmur, click, rub or gallop. Abdomen:   Soft, tender right lower quadrant. Bowel sounds normal. No masses,  No organomegaly. Genitalia:  deferred   Rectal:  deferred   Extremities: Extremities normal, atraumatic, no cyanosis or edema. Pulses: 2+ and symmetric all extremities. Skin: Skin color, texture, turgor normal. No rashes or lesions   Lymph nodes: Cervical, supraclavicular, and axillary nodes normal.   Neurologic: CNII-XII intact. Normal strength, sensation and reflexes throughout. Diabetic foot exam:     Left Foot:   Visual Exam: normal    Pulse DP: 2+ (normal)   Filament test: normal sensation       Right Foot:   Visual Exam: normal    Pulse DP: 2+ (normal)   Filament test: normal sensation         LABS   hgb a1c 10.6    Assessment/Plan:    1. Medicare annual wellness visit, initial  Reviewed preventive recommendations  - [address] [place] [address] [employer]    2. Advance care planning  See ACP  - ADVANCE CARE PLANNING FIRST 30 MINS    3. Diabetes mellitus due to underlying condition with hyperosmolarity without coma, with long-term current use of insulin ([facility])  Goal hgb a1c <7; discussed starting new medications; pt declined and will change his lifestyle  -  DIABETES FOOT EXAM  - pioglitazone (ACTOS) 45 mg tablet; Take 1 Tab by mouth daily. Dispense: 90 Tab; Refill: 3  - AMB POC HEMOGLOBIN A1C    4. Hypercholesterolemia  Continue statin therapy    5. Weight loss  Ongoing weight loss with appetite booster; imaging ordered due to abdominal pain and abdominal pain  - megestroL (MEGACE) 20 mg tablet; Take 1 Tab by mouth daily. Dispense: 30 Tab; Refill: 2  - CT ABD PELV W WO CONT; Future    6. Erectile dysfunction due to arterial insufficiency  F/u with urology    7.  Right lower quadrant abdominal pain    - CT ABD PELV W WO CONT; Future    Lab review: labs reviewed, I note that glycosylated hemoglobin abnormal high

## 2020-07-02 NOTE — PROGRESS NOTES
Tonio Bienvenido. presents today for   Chief Complaint   Patient presents with    Diabetes    Benign Prostatic Hypertrophy    Weight Loss       Is someone accompanying this pt? no    Is the patient using any DME equipment during OV? no    Depression Screening:  3 most recent PHQ Screens 12/12/2019   Little interest or pleasure in doing things Not at all   Feeling down, depressed, irritable, or hopeless Not at all   Total Score PHQ 2 0       Learning Assessment:  Learning Assessment 6/28/2019   PRIMARY LEARNER Patient   PRIMARY LANGUAGE ENGLISH   LEARNER PREFERENCE PRIMARY LISTENING   ANSWERED BY patient   RELATIONSHIP SELF       Abuse Screening:  Abuse Screening Questionnaire 6/28/2019   Do you ever feel afraid of your partner? N   Are you in a relationship with someone who physically or mentally threatens you? N   Is it safe for you to go home? Y       Fall Risk  Fall Risk Assessment, last 12 mths 12/12/2019   Able to walk? Yes   Fall in past 12 months? No   Fall with injury? -   Number of falls in past 12 months -   Fall Risk Score -       Health Maintenance reviewed and discussed and ordered per Provider. Health Maintenance Due   Topic Date Due    Eye Exam Retinal or Dilated  08/06/1944    DTaP/Tdap/Td series (1 - Tdap) 08/06/1956    Shingrix Vaccine Age 50> (1 of 2) 08/06/1985    GLAUCOMA SCREENING Q2Y  08/06/2000    Pneumococcal 65+ years (1 of 1 - PPSV23) 08/06/2000    MICROALBUMIN Q1  02/21/2020    Lipid Screen  02/21/2020    Medicare Yearly Exam  04/03/2020    Foot Exam Q1  05/09/2020   . Coordination of Care:  1. Have you been to the ER, urgent care clinic since your last visit? Hospitalized since your last visit? no    2. Have you seen or consulted any other health care providers outside of the [address] [place] [address] since your last visit? Include any pap smears or colon screening.  no      Last  Checked na  Last UDS Checked na  Last Pain contract signed: na    Patient presents in office today for routine care.   Patient concerns: med refills

## 2020-07-02 NOTE — ACP (ADVANCE CARE PLANNING)
Advance Care Planning (ACP) Provider Note - Comprehensive      Date of ACP Conversation: 07/2/2020  Persons included in Conversation:  patient  Length of ACP Conversation in minutes:  16 minutes     Authorized Decision Maker (if patient is incapable of making informed decisions): This person is:   Shailesh salas Lamb for ALL Patients with Decision Making Capacity:   Importance of advance care planning, including choosing a healthcare agent to communicate patient's healthcare decisions if patient lost the ability to make decisions, such as after a sudden illness or accident  Understanding of the healthcare agent role was assessed and information provided  Exploration of values, goals, and preferences if recovery is not expected, even with continued medical treatment in the event of: Imminent death  Severe, permanent brain injury  \"In these circumstances, what matters most to you? \"  Care focused more on comfort or quality of life. \"What, if any, treatments would you want to avoid? \" none  Opportunity offered to explore how cultural, Church, spiritual, or personal beliefs would affect decisions for future care      Review of Existing Advance Directive:  What information were you given about medical decisions to consider before completing your advance directive? conversation starter     For Serious or Chronic Illness:  Understanding of medical condition       Interventions Provided:  Reviewed existing Advance Directive

## 2020-07-16 DIAGNOSIS — E08.00 DIABETES MELLITUS DUE TO UNDERLYING CONDITION WITH HYPEROSMOLARITY WITHOUT COMA, WITH LONG-TERM CURRENT USE OF INSULIN (HCC): ICD-10-CM

## 2020-07-16 DIAGNOSIS — Z79.4 DIABETES MELLITUS DUE TO UNDERLYING CONDITION WITH HYPEROSMOLARITY WITHOUT COMA, WITH LONG-TERM CURRENT USE OF INSULIN (HCC): ICD-10-CM

## 2020-07-17 RX ORDER — PIOGLITAZONEHYDROCHLORIDE 45 MG/1
TABLET ORAL
Qty: 90 TAB | Refills: 3 | Status: SHIPPED | OUTPATIENT
Start: 2020-07-17 | End: 2022-03-18

## 2020-10-19 RX ORDER — EMPAGLIFLOZIN 25 MG/1
TABLET, FILM COATED ORAL
Qty: 90 TAB | Refills: 3 | Status: SHIPPED | OUTPATIENT
Start: 2020-10-19 | End: 2022-03-18 | Stop reason: SDUPTHER

## 2020-11-13 DIAGNOSIS — E11.65 TYPE 2 DIABETES MELLITUS WITH HYPERGLYCEMIA, WITHOUT LONG-TERM CURRENT USE OF INSULIN (HCC): ICD-10-CM

## 2020-11-13 RX ORDER — MULTIVIT-MIN/FERROUS FUMARATE 9 MG/15 ML
2 LIQUID (ML) ORAL 2 TIMES DAILY
Qty: 15 ML | Refills: 1 | Status: SHIPPED | OUTPATIENT
Start: 2020-11-13 | End: 2021-11-30

## 2020-11-13 RX ORDER — PEN NEEDLE, DIABETIC 30 GX3/16"
NEEDLE, DISPOSABLE MISCELLANEOUS
Qty: 90 PEN NEEDLE | Refills: 3 | Status: SHIPPED | OUTPATIENT
Start: 2020-11-13 | End: 2021-05-25 | Stop reason: SDUPTHER

## 2020-11-13 NOTE — TELEPHONE ENCOUNTER
Pt called in and informed me that he needed a refill on his prescriptions. Please advise. He needs these to go to the [facility]. Please advise.

## 2021-01-06 ENCOUNTER — VIRTUAL VISIT (OUTPATIENT)
Dept: FAMILY MEDICINE CLINIC | Age: 86
End: 2021-01-06
Payer: MEDICARE

## 2021-01-06 DIAGNOSIS — E11.65 POORLY CONTROLLED DIABETES MELLITUS (HCC): ICD-10-CM

## 2021-01-06 DIAGNOSIS — R63.4 UNINTENTIONAL WEIGHT LOSS: ICD-10-CM

## 2021-01-06 DIAGNOSIS — R05.9 COUGH: ICD-10-CM

## 2021-01-06 DIAGNOSIS — F33.9 RECURRENT DEPRESSION (HCC): ICD-10-CM

## 2021-01-06 DIAGNOSIS — H91.93 DECREASED HEARING OF BOTH EARS: ICD-10-CM

## 2021-01-06 DIAGNOSIS — R63.4 UNINTENTIONAL WEIGHT LOSS: Primary | ICD-10-CM

## 2021-01-06 DIAGNOSIS — N52.01 ERECTILE DYSFUNCTION DUE TO ARTERIAL INSUFFICIENCY: ICD-10-CM

## 2021-01-06 DIAGNOSIS — E08.00 DIABETES MELLITUS DUE TO UNDERLYING CONDITION WITH HYPEROSMOLARITY WITHOUT COMA, WITH LONG-TERM CURRENT USE OF INSULIN (HCC): ICD-10-CM

## 2021-01-06 DIAGNOSIS — Z79.4 DIABETES MELLITUS DUE TO UNDERLYING CONDITION WITH HYPEROSMOLARITY WITHOUT COMA, WITH LONG-TERM CURRENT USE OF INSULIN (HCC): ICD-10-CM

## 2021-01-06 PROCEDURE — G8428 CUR MEDS NOT DOCUMENT: HCPCS | Performed by: FAMILY MEDICINE

## 2021-01-06 PROCEDURE — G8510 SCR DEP NEG, NO PLAN REQD: HCPCS | Performed by: FAMILY MEDICINE

## 2021-01-06 PROCEDURE — 1101F PT FALLS ASSESS-DOCD LE1/YR: CPT | Performed by: FAMILY MEDICINE

## 2021-01-06 PROCEDURE — 99214 OFFICE O/P EST MOD 30 MIN: CPT | Performed by: FAMILY MEDICINE

## 2021-01-06 RX ORDER — MIRTAZAPINE 7.5 MG/1
7.5 TABLET, FILM COATED ORAL
Qty: 30 TAB | Refills: 2 | Status: SHIPPED | OUTPATIENT
Start: 2021-01-06 | End: 2021-08-24

## 2021-01-06 NOTE — PROGRESS NOTES
Ouida Boas presents today for   Chief Complaint   Patient presents with    Diabetes    Weight Loss       Is someone accompanying this pt? na    Is the patient using any DME equipment during OV? na    Depression Screening:  3 most recent PHQ Screens 1/6/2021   Little interest or pleasure in doing things Several days   Feeling down, depressed, irritable, or hopeless Several days   Total Score PHQ 2 2       Learning Assessment:  Learning Assessment 6/28/2019   PRIMARY LEARNER Patient   PRIMARY LANGUAGE ENGLISH   LEARNER PREFERENCE PRIMARY LISTENING   ANSWERED BY patient   RELATIONSHIP SELF       Abuse Screening:  Abuse Screening Questionnaire 1/6/2021   Do you ever feel afraid of your partner? N   Are you in a relationship with someone who physically or mentally threatens you? N   Is it safe for you to go home? Y       Fall Risk  Fall Risk Assessment, last 12 mths 1/6/2021   Able to walk? Yes   Fall in past 12 months? 0   Do you feel unsteady? 0   Are you worried about falling 0   Number of falls in past 12 months -   Fall with injury? -       Health Maintenance reviewed and discussed and ordered per Provider. Health Maintenance Due   Topic Date Due    Eye Exam Retinal or Dilated  08/06/1945    DTaP/Tdap/Td series (1 - Tdap) 08/06/1957    Shingrix Vaccine Age 50> (1 of 2) 08/06/1986    GLAUCOMA SCREENING Q2Y  08/06/2001    MICROALBUMIN Q1  02/21/2020    Lipid Screen  02/21/2020    Flu Vaccine (1) 09/01/2020   . Coordination of Care:  1. Have you been to the ER, urgent care clinic since your last visit? Hospitalized since your last visit? Yes, 12/7/20, [facility], a fall    2. Have you seen or consulted any other health care providers outside of the [address] [place] [address] since your last visit? Include any pap smears or colon screening.  no      Last  Checked na  Last UDS Checked na  Last Pain contract signed: na    Patients concerns today:  Weight loss

## 2021-01-06 NOTE — PROGRESS NOTES
Sergio aWn is a 81 y.o.  male and presents with    Chief Complaint   Patient presents with    Diabetes    Weight Loss     Sergio Wan, who was evaluated through a synchronous (real-time) audio-video encounter, and/or his healthcare decision maker, is aware that it is a billable service, with coverage as determined by his insurance carrier. He provided verbal consent to proceed: Yes, and patient identification was verified. It was conducted pursuant to the emergency declaration under the [facility], [address] [place] [address] authority and the [employer] Act. A caregiver was present when appropriate. Ability to conduct physical exam was limited. I was in the office. The patient was at home. Subjective:  He c/o ongoing weight loss and now with dyspnea on exertion. Cardiovascular Review:  The patient has diabetes and hyperlipidemia. Diet and Lifestyle: generally follows a low fat low cholesterol diet, generally follows a low sodium diet, does not rigorously follow a diabetic diet, exercises sporadically, former smoker  Home BP Monitoring: is not measured at home. Pertinent ROS: taking medications as instructed, no medication side effects noted, no TIA's, no chest pain on exertion, notes new dyspnea on exertion, no swelling of ankles. Diabetes Mellitus:  He has diabetes mellitus, and  hyperlipidemia. Diabetic ROS - medication compliance: noncompliant some of the time, diabetic diet compliance: noncompliant some of the time. Lab review: labs reviewed, I note that glycosylated hemoglobin abnormal and due. ROS   Constitutional: Negative for fever; positive for weight loss and night sweats  HENT: Negative for sore throat.    Eyes: Negative for visual disturbance. Respiratory: No shortness of breath. Positive for cough and wheezing.    Cardiovascular: Negative for chest pain.  Positive for dyspnea on exertion  Gastrointestinal: positive for abdominal pain which is localized to right inguinal area which is intermittent. He has intermittent loose stool  Endocrine: positive for polyuria. Musculoskeletal: Negative for gait problem. Skin: Negative for rash. Allergic/Immunologic: Negative for immunocompromised state. Neurological: Negative for syncope. Psychiatric/Behavioral: Negative for sleep disturbance.   All other systems reviewed and are negative. Objective: There were no vitals filed for this visit. alert, well appearing, and in no distress, oriented to person, place, and time and thin  Mental status - normal mood, behavior, speech, dress, motor activity, and thought processes  Chest - normal work of breathing  Neurological - cranial nerves II through XII intact    LABS     TESTS      Assessment/Plan:    1. Unintentional weight loss  Assess for any infection  - XR CHEST PA LAT; Future    2. Cough  Assess for pulmonary abnormalities  - XR CHEST PA LAT; Future    3. Poorly controlled diabetes mellitus ([address]  Goal hgb a1c <7    4. Type 2 diabetes mellitus with hyperglycemia, with long-term current use of insulin (HCC)  Increase insulin with goal fasting glucose goal <150    5. Erectile dysfunction due to arterial insufficiency      6. Decreased hearing of both ears    - REFERRAL TO AUDIOLOGY    7. Recurrent depression ([address]  Start treatment for mood  - mirtazapine (REMERON) 7.5 mg tablet; Take 1 Tab by mouth nightly. Dispense: 30 Tab; Refill: 2    Lab review: orders written for new lab studies as appropriate; see orders      I have discussed the diagnosis with the patient and the intended plan as seen in the above orders. I have discussed medication side effects and warnings with the patient as well. I have reviewed the plan of care with the patient, accepted their input and they are in agreement with the treatment goals.

## 2021-01-07 ENCOUNTER — HOSPITAL ENCOUNTER (OUTPATIENT)
Dept: GENERAL RADIOLOGY | Age: 86
End: 2021-01-07
Payer: MEDICARE

## 2021-01-07 ENCOUNTER — HOSPITAL ENCOUNTER (OUTPATIENT)
Dept: LAB | Age: 86
Discharge: HOME | End: 2021-01-07
Payer: MEDICARE

## 2021-01-07 DIAGNOSIS — E08.00 DIABETES MELLITUS DUE TO UNDERLYING CONDITION WITH HYPEROSMOLARITY WITHOUT COMA, WITH LONG-TERM CURRENT USE OF INSULIN (HCC): ICD-10-CM

## 2021-01-07 DIAGNOSIS — E11.65 POORLY CONTROLLED DIABETES MELLITUS (HCC): ICD-10-CM

## 2021-01-07 DIAGNOSIS — Z79.4 DIABETES MELLITUS DUE TO UNDERLYING CONDITION WITH HYPEROSMOLARITY WITHOUT COMA, WITH LONG-TERM CURRENT USE OF INSULIN (HCC): ICD-10-CM

## 2021-01-07 LAB
CHOLEST SERPL-MCNC: 160 MG/DL
CREAT UR-MCNC: 108 MG/DL (ref 30–125)
EST. AVERAGE GLUCOSE BLD GHB EST-MCNC: 269 MG/DL
HBA1C MFR BLD: 11 % (ref 4.2–5.6)
HDLC SERPL-MCNC: 67 MG/DL (ref 40–60)
HDLC SERPL: 2.4 {RATIO} (ref 0–5)
LDLC SERPL CALC-MCNC: 76 MG/DL (ref 0–100)
LIPID PROFILE,FLP: ABNORMAL
MICROALBUMIN UR-MCNC: 134 MG/DL (ref 0–3)
MICROALBUMIN/CREAT UR-RTO: 1241 MG/G (ref 0–30)
TRIGL SERPL-MCNC: 85 MG/DL (ref ?–150)
VLDLC SERPL CALC-MCNC: 17 MG/DL

## 2021-01-07 PROCEDURE — 80061 LIPID PANEL: CPT

## 2021-01-07 PROCEDURE — 36415 COLL VENOUS BLD VENIPUNCTURE: CPT

## 2021-01-07 PROCEDURE — 83036 HEMOGLOBIN GLYCOSYLATED A1C: CPT

## 2021-01-07 PROCEDURE — 82043 UR ALBUMIN QUANTITATIVE: CPT

## 2021-01-08 ENCOUNTER — HOSPITAL ENCOUNTER (OUTPATIENT)
Dept: GENERAL RADIOLOGY | Age: 86
Discharge: HOME | End: 2021-01-08
Payer: MEDICARE

## 2021-01-08 ENCOUNTER — TRANSCRIBE ORDER (OUTPATIENT)
Dept: REGISTRATION | Age: 86
End: 2021-01-08

## 2021-01-08 DIAGNOSIS — R63.4 LOSS OF WEIGHT: ICD-10-CM

## 2021-01-08 DIAGNOSIS — G89.29 CHRONIC HAND PAIN, LEFT: ICD-10-CM

## 2021-01-08 DIAGNOSIS — R05.9 COUGH: ICD-10-CM

## 2021-01-08 DIAGNOSIS — M79.641 CHRONIC HAND PAIN, RIGHT: Primary | ICD-10-CM

## 2021-01-08 DIAGNOSIS — G89.29 CHRONIC HAND PAIN, RIGHT: Primary | ICD-10-CM

## 2021-01-08 DIAGNOSIS — M79.641 CHRONIC HAND PAIN, RIGHT: ICD-10-CM

## 2021-01-08 DIAGNOSIS — M79.642 CHRONIC HAND PAIN, LEFT: ICD-10-CM

## 2021-01-08 DIAGNOSIS — R63.4 LOSS OF WEIGHT: Primary | ICD-10-CM

## 2021-01-08 DIAGNOSIS — G89.29 CHRONIC HAND PAIN, RIGHT: ICD-10-CM

## 2021-01-08 PROCEDURE — 71046 X-RAY EXAM CHEST 2 VIEWS: CPT

## 2021-01-11 ENCOUNTER — HOSPITAL ENCOUNTER (OUTPATIENT)
Dept: GENERAL RADIOLOGY | Age: 86
Discharge: HOME | End: 2021-01-11
Payer: MEDICARE

## 2021-01-11 PROCEDURE — 73130 X-RAY EXAM OF HAND: CPT

## 2021-01-16 ENCOUNTER — HOSPITAL ENCOUNTER (EMERGENCY)
Age: 86
Discharge: HOME | End: 2021-01-16
Attending: EMERGENCY MEDICINE
Payer: MEDICARE

## 2021-01-16 ENCOUNTER — APPOINTMENT (OUTPATIENT)
Dept: CT IMAGING | Age: 86
End: 2021-01-16
Attending: EMERGENCY MEDICINE
Payer: MEDICARE

## 2021-01-16 VITALS
TEMPERATURE: 97.9 F | RESPIRATION RATE: 19 BRPM | OXYGEN SATURATION: 98 % | DIASTOLIC BLOOD PRESSURE: 72 MMHG | SYSTOLIC BLOOD PRESSURE: 122 MMHG | HEART RATE: 88 BPM

## 2021-01-16 DIAGNOSIS — E11.65 TYPE 2 DIABETES MELLITUS WITH HYPERGLYCEMIA, WITHOUT LONG-TERM CURRENT USE OF INSULIN (HCC): ICD-10-CM

## 2021-01-16 DIAGNOSIS — N39.0 URINARY TRACT INFECTION WITHOUT HEMATURIA, SITE UNSPECIFIED: Primary | ICD-10-CM

## 2021-01-16 DIAGNOSIS — R63.4 WEIGHT LOSS: ICD-10-CM

## 2021-01-16 LAB
ALBUMIN SERPL-MCNC: 3 G/DL (ref 3.4–5)
ALBUMIN/GLOB SERPL: 0.7 {RATIO} (ref 0.8–1.7)
ALP SERPL-CCNC: 107 U/L (ref 45–117)
ALT SERPL-CCNC: 27 U/L (ref 16–61)
ANION GAP SERPL CALC-SCNC: 8 MMOL/L (ref 3–18)
APPEARANCE UR: ABNORMAL
AST SERPL-CCNC: 23 U/L (ref 10–38)
BACTERIA URNS QL MICRO: ABNORMAL /HPF
BASOPHILS # BLD: 0 K/UL (ref 0–0.1)
BASOPHILS NFR BLD: 1 % (ref 0–2)
BILIRUB SERPL-MCNC: 0.5 MG/DL (ref 0.2–1)
BILIRUB UR QL: NEGATIVE
BUN SERPL-MCNC: 23 MG/DL (ref 7–18)
BUN/CREAT SERPL: 21 (ref 12–20)
CALCIUM SERPL-MCNC: 8.5 MG/DL (ref 8.5–10.1)
CHLORIDE SERPL-SCNC: 104 MMOL/L (ref 100–111)
CO2 SERPL-SCNC: 23 MMOL/L (ref 21–32)
COLOR UR: YELLOW
CREAT SERPL-MCNC: 1.11 MG/DL (ref 0.6–1.3)
DIFFERENTIAL METHOD BLD: ABNORMAL
EOSINOPHIL # BLD: 0 K/UL (ref 0–0.4)
EOSINOPHIL NFR BLD: 0 % (ref 0–5)
EPITH CASTS URNS QL MICRO: ABNORMAL /LPF (ref 0–5)
ERYTHROCYTE [DISTWIDTH] IN BLOOD BY AUTOMATED COUNT: 13.3 % (ref 11.6–14.5)
GLOBULIN SER CALC-MCNC: 4.1 G/DL (ref 2–4)
GLUCOSE SERPL-MCNC: 334 MG/DL (ref 74–99)
GLUCOSE UR STRIP.AUTO-MCNC: >1000 MG/DL
HCT VFR BLD AUTO: 51.3 % (ref 36–48)
HGB BLD-MCNC: 16.6 G/DL (ref 13–16)
HGB UR QL STRIP: ABNORMAL
KETONES UR QL STRIP.AUTO: 15 MG/DL
LEUKOCYTE ESTERASE UR QL STRIP.AUTO: ABNORMAL
LYMPHOCYTES # BLD: 0.8 K/UL (ref 0.9–3.6)
LYMPHOCYTES NFR BLD: 28 % (ref 21–52)
MCH RBC QN AUTO: 32.7 PG (ref 24–34)
MCHC RBC AUTO-ENTMCNC: 32.4 G/DL (ref 31–37)
MCV RBC AUTO: 101 FL (ref 74–97)
MONOCYTES # BLD: 0.4 K/UL (ref 0.05–1.2)
MONOCYTES NFR BLD: 14 % (ref 3–10)
NEUTS SEG # BLD: 1.7 K/UL (ref 1.8–8)
NEUTS SEG NFR BLD: 57 % (ref 40–73)
NITRITE UR QL STRIP.AUTO: NEGATIVE
PH UR STRIP: 5 [PH] (ref 5–8)
PLATELET # BLD AUTO: 307 K/UL (ref 135–420)
PMV BLD AUTO: 10.9 FL (ref 9.2–11.8)
POTASSIUM SERPL-SCNC: 5 MMOL/L (ref 3.5–5.5)
PROT SERPL-MCNC: 7.1 G/DL (ref 6.4–8.2)
PROT UR STRIP-MCNC: 100 MG/DL
RBC # BLD AUTO: 5.08 M/UL (ref 4.7–5.5)
RBC #/AREA URNS HPF: ABNORMAL /HPF (ref 0–5)
SODIUM SERPL-SCNC: 135 MMOL/L (ref 136–145)
SP GR UR REFRACTOMETRY: >1.03 (ref 1–1.03)
TROPONIN I SERPL-MCNC: <0.02 NG/ML (ref 0–0.04)
UROBILINOGEN UR QL STRIP.AUTO: 0.2 EU/DL (ref 0.2–1)
WBC # BLD AUTO: 2.9 K/UL (ref 4.6–13.2)
WBC URNS QL MICRO: ABNORMAL /HPF (ref 0–4)

## 2021-01-16 PROCEDURE — 81001 URINALYSIS AUTO W/SCOPE: CPT

## 2021-01-16 PROCEDURE — 84484 ASSAY OF TROPONIN QUANT: CPT

## 2021-01-16 PROCEDURE — 80053 COMPREHEN METABOLIC PANEL: CPT

## 2021-01-16 PROCEDURE — 85025 COMPLETE CBC W/AUTO DIFF WBC: CPT

## 2021-01-16 PROCEDURE — 93005 ELECTROCARDIOGRAM TRACING: CPT

## 2021-01-16 PROCEDURE — 74177 CT ABD & PELVIS W/CONTRAST: CPT

## 2021-01-16 PROCEDURE — 74011000636 HC RX REV CODE- 636: Performed by: EMERGENCY MEDICINE

## 2021-01-16 PROCEDURE — 99284 EMERGENCY DEPT VISIT MOD MDM: CPT

## 2021-01-16 RX ORDER — CEPHALEXIN 500 MG/1
500 CAPSULE ORAL 4 TIMES DAILY
Qty: 40 CAP | Refills: 0 | Status: SHIPPED | OUTPATIENT
Start: 2021-01-16 | End: 2021-01-26

## 2021-01-16 RX ADMIN — IOPAMIDOL 100 ML: 612 INJECTION, SOLUTION INTRAVENOUS at 13:12

## 2021-01-16 NOTE — ED NOTES
Patient left unit prior to receiving written discharge instructions.  Provider had given verbal instructions and patient was ambulatory off unit

## 2021-01-16 NOTE — ED NOTES
Patient reports having a GI surgery \"a few months ago for a pinhole in my colon\" patient reports usually weighing 145 pounds and in one month has dropped to 115 pounds. Patient denies other symptoms.

## 2021-01-16 NOTE — ED PROVIDER NOTES
EMERGENCY DEPARTMENT HISTORY AND PHYSICAL EXAM    12:52 PM      Date: 1/16/2021  Patient Name: Eran Foy    History of Presenting Illness     Chief Complaint   Patient presents with    Weight Loss    Lethargy    Dizziness         History Provided By: Carlos Wheeler    Additional History (Context): Eran Foy is a 81 y.o. male presents with history of diabetes comes in with weight loss. Was dizzy this morning. Patient describes as vertiginous symptoms and had to use his cane. He is asymptomatic at the time that I evaluate him saying and wants to go home. No other focal weakness or stroke syndrome. Has never had a colonoscopy. Has had a partial outpatient work-up by Dr. Gemini Urban. Harper Lora PCP: Tam Loyola MD    Chief Complaint:   Duration:    Timing:    Location:   Quality:   Severity:   Modifying Factors:   Associated Symptoms:       Current Outpatient Medications   Medication Sig Dispense Refill    cephALEXin (Keflex) 500 mg capsule Take 1 Cap by mouth four (4) times daily for 10 days. 40 Cap 0    mirtazapine (REMERON) 7.5 mg tablet Take 1 Tab by mouth nightly. 30 Tab 2    insulin detemir U-100 (LEVEMIR FLEXTOUCH) 100 unit/mL (3 mL) inpn 18 Units by SubCUTAneous route nightly. 15 Pen 4    Insulin Needles, Disposable, 31 gauge x 5/16\" ndle Give insulin once a day 90 Pen Needle 3    [id number] (Eye Drops, with povidone,) 0.05-0.1-1-1 % drop Apply 2 Drops to eye two (2) times a day. 15 mL 1    Jardiance 25 mg tablet take 1 tablet by mouth once daily 90 Tab 3    sildenafil citrate (VIAGRA) 100 mg tablet Take 1 Tab by mouth daily as needed (for ED. No more than 1 tab a day). 30 Tab 2    pioglitazone (ACTOS) 45 mg tablet take 1 tablet by mouth once daily 90 Tab 3    ONE TOUCH DELICA 33 gauge misc Test blood glucose 3 times a day 100 Lancet 4    ONETOUCH VERIO SYSTEM misc       pravastatin (PRAVACHOL) 40 mg tablet Take 1 Tab by mouth nightly.  90 Tab 3    glucose blood VI test strips (ONETOUCH VERIO) strip Test blood glucose twice a day 100 Strip 11    finasteride (PROSCAR) 5 mg tablet take 1 tablet by mouth once daily 90 Tab 3       Past History     Past Medical History:  Past Medical History:   Diagnosis Date    Abdominal pain     Acute cystitis with hematuria     Bladder mass     Bladder pain     BPH (benign prostatic hyperplasia)     BPH with obstruction/lower urinary tract symptoms     Chronic prostatitis     Diabetes ([address]     ED (erectile dysfunction)     Elevated PSA     Essential hypertension     Hematuria     History of UTI     Hypercholesterolemia     Nocturia     Urinary tract infection, site not specified     Urine leukocytes        Past Surgical History:  Past Surgical History:   Procedure Laterality Date    HX LAPAROTOMY  04/04/2019    HX OTHER SURGICAL  01/21/2015    Dr. Sarah Neves RT Eye surgery    HX UROLOGICAL  2013    TURP x 3 (most recent 2013).  HX VITRECTOMY  04/06/2017    pars plana vitrectomy and membrane peel of left eye    SC ABDOMEN SURGERY PROC UNLISTED      stabbing    SC CYSTOSCOPY,RESEC EJACULATORY DUCT         Family History:  Family History   Problem Relation Age of Onset    Diabetes Mother     Kidney Disease Sister        Social History:  Social History     Tobacco Use    Smoking status: Former Smoker     Packs/day: 0.25     Years: 65.00     Pack years: 16.25     Types: Cigarettes    Smokeless tobacco: Never Used    Tobacco comment: he is cutting down   Substance Use Topics    Alcohol use: Yes     Frequency: Never     Comment: social    Drug use: No       Allergies:  No Known Allergies      Review of Systems     Review of Systems   Constitutional: Positive for unexpected weight change. Negative for diaphoresis and fever. HENT: Negative for congestion and sore throat. Eyes: Negative for pain and itching. Respiratory: Negative for cough and shortness of breath.     Cardiovascular: Negative for chest pain and palpitations. Gastrointestinal: Negative for abdominal pain and diarrhea. Endocrine: Negative for polydipsia and polyuria. Genitourinary: Negative for dysuria and hematuria. Musculoskeletal: Negative for arthralgias and myalgias. Skin: Negative for rash and wound. Neurological: Negative for seizures and syncope. Hematological: Does not bruise/bleed easily. Psychiatric/Behavioral: Negative for agitation and hallucinations. Physical Exam       Patient Vitals for the past 12 hrs:   Temp Pulse Resp BP SpO2   01/16/21 1128  88 19  98 %   01/16/21 1115  91 19 122/72 99 %   01/16/21 1041 97.9 °F (36.6 °C) (!) 108 18 112/80 98 %       Physical Exam  Vitals signs and nursing note reviewed. Constitutional:       General: He is not in acute distress. Appearance: He is well-developed. He is not ill-appearing. Comments: Slightly thin, not impressively cachectic though   HENT:      Head: Normocephalic and atraumatic. Eyes:      General: No scleral icterus. Conjunctiva/sclera: Conjunctivae normal.   Neck:      Musculoskeletal: Normal range of motion and neck supple. Vascular: No JVD. Cardiovascular:      Rate and Rhythm: Normal rate and regular rhythm. Heart sounds: Normal heart sounds. Comments: 4 intact extremity pulses  Pulmonary:      Effort: Pulmonary effort is normal.      Breath sounds: Normal breath sounds. Abdominal:      Palpations: Abdomen is soft. There is no mass. Tenderness: There is no abdominal tenderness. Hernia: No hernia is present. Musculoskeletal: Normal range of motion. Lymphadenopathy:      Cervical: No cervical adenopathy. Skin:     General: Skin is warm and dry. Neurological:      General: No focal deficit present. Mental Status: He is alert. Cranial Nerves: No cranial nerve deficit. Motor: No weakness.       Coordination: Coordination normal.           Diagnostic Study Results   Labs -  Recent Results (from the past 12 hour(s))   CBC WITH AUTOMATED DIFF    Collection Time: 01/16/21 10:50 AM   Result Value Ref Range    WBC 2.9 (L) 4.6 - 13.2 K/uL    RBC 5.08 4.70 - 5.50 M/uL    HGB 16.6 (H) 13.0 - 16.0 g/dL    HCT 51.3 (H) 36.0 - 48.0 %    .0 (H) 74.0 - 97.0 FL    MCH 32.7 24.0 - 34.0 PG    MCHC 32.4 31.0 - 37.0 g/dL    RDW 13.3 11.6 - 14.5 %    PLATELET 286 822 - 057 K/uL    MPV 10.9 9.2 - 11.8 FL    NEUTROPHILS 57 40 - 73 %    LYMPHOCYTES 28 21 - 52 %    MONOCYTES 14 (H) 3 - 10 %    EOSINOPHILS 0 0 - 5 %    BASOPHILS 1 0 - 2 %    ABS. NEUTROPHILS 1.7 (L) 1.8 - 8.0 K/UL    ABS. LYMPHOCYTES 0.8 (L) 0.9 - 3.6 K/UL    ABS. MONOCYTES 0.4 0.05 - 1.2 K/UL    ABS. EOSINOPHILS 0.0 0.0 - 0.4 K/UL    ABS. BASOPHILS 0.0 0.0 - 0.1 K/UL    DF AUTOMATED     METABOLIC PANEL, COMPREHENSIVE    Collection Time: 01/16/21 10:50 AM   Result Value Ref Range    Sodium 135 (L) 136 - 145 mmol/L    Potassium 5.0 3.5 - 5.5 mmol/L    Chloride 104 100 - 111 mmol/L    CO2 23 21 - 32 mmol/L    Anion gap 8 3.0 - 18 mmol/L    Glucose 334 (H) 74 - 99 mg/dL    BUN 23 (H) 7.0 - 18 MG/DL    Creatinine 1.11 0.6 - 1.3 MG/DL    BUN/Creatinine ratio 21 (H) 12 - 20      GFR est AA >60 >60 ml/min/1.73m2    GFR est non-AA >60 >60 ml/min/1.73m2    Calcium 8.5 8.5 - 10.1 MG/DL    Bilirubin, total 0.5 0.2 - 1.0 MG/DL    ALT (SGPT) 27 16 - 61 U/L    AST (SGOT) 23 10 - 38 U/L    Alk.  phosphatase 107 45 - 117 U/L    Protein, total 7.1 6.4 - 8.2 g/dL    Albumin 3.0 (L) 3.4 - 5.0 g/dL    Globulin 4.1 (H) 2.0 - 4.0 g/dL    A-G Ratio 0.7 (L) 0.8 - 1.7     TROPONIN I    Collection Time: 01/16/21 10:50 AM   Result Value Ref Range    Troponin-I, QT <0.02 0.0 - 0.045 NG/ML   EKG, 12 LEAD, INITIAL    Collection Time: 01/16/21  1:18 PM   Result Value Ref Range    Ventricular Rate 74 BPM    Atrial Rate 74 BPM    P-R Interval 124 ms    QRS Duration 86 ms    Q-T Interval 350 ms    QTC Calculation (Bezet) 388 ms    Calculated P Axis 61 degrees    Calculated R Axis 71 degrees Calculated T Axis 66 degrees    Diagnosis       Normal sinus rhythm  Normal ECG  When compared with ECG of 18-APR-2019 18:40,  premature ventricular complexes are no longer present     URINALYSIS W/ RFLX MICROSCOPIC    Collection Time: 01/16/21  1:41 PM   Result Value Ref Range    Color YELLOW      Appearance TURBID      Specific gravity >1.030 (H) 1.005 - 1.030    pH (UA) 5.0 5.0 - 8.0      Protein 100 (A) NEG mg/dL    Glucose >1,000 (A) NEG mg/dL    Ketone 15 (A) NEG mg/dL    Bilirubin Negative NEG      Blood MODERATE (A) NEG      Urobilinogen 0.2 0.2 - 1.0 EU/dL    Nitrites Negative NEG      Leukocyte Esterase MODERATE (A) NEG     URINE MICROSCOPIC ONLY    Collection Time: 01/16/21  1:41 PM   Result Value Ref Range    WBC TOO NUMEROUS TO COUNT 0 - 4 /hpf    RBC 4 to 6 0 - 5 /hpf    Epithelial cells 2+ 0 - 5 /lpf    Bacteria 2+ (A) NEG /hpf       Radiologic Studies -   CT ABD PELV W CONT   Final Result   IMPRESSION:         1. Large prostate protruding into the urinary bladder appears stable. 2. No new masses or lymphadenopathy. Ct Abd Pelv W Cont    Result Date: 1/16/2021  EXAM: CT of the abdomen and pelvis INDICATION: Weight loss. COMPARISON: 4/4/2019. TECHNIQUE: Axial CT imaging of the abdomen and pelvis was performed with intravenous contrast. Multiplanar reformats were generated. One or more dose reduction techniques were used on this CT: automated exposure control, adjustment of the mAs and/or kVp according to patient size, and iterative reconstruction techniques. The specific techniques used on this CT exam have been documented in the patient's electronic medical record. Digital Imaging and Communications in Medicine (DICOM) format image data are available to nonaffiliated external healthcare facilities or entities on a secure, media free, reciprocally searchable basis with patient authorization for at least a 12-month period after this study.  _______________ FINDINGS: LOWER CHEST: Bilateral lower lobe atelectasis. Varicoses along the distal esophagus appears stable. LIVER, BILIARY: Stable small hypodensity in the right liver lobe periphery. Gallbladder is unremarkable. Stable dilated common bile duct. PANCREAS: Atrophic. SPLEEN: Normal. ADRENALS: Normal. KIDNEYS: Stable small bilateral renal cortical hypodensities likely cysts. Right lateral bladder wall diverticulum LYMPH NODES: No enlarged lymph nodes. GASTROINTESTINAL TRACT: No bowel dilation or wall thickening. Uncomplicated diverticula seen throughout the colon. PELVIC ORGANS: Prostate enlargement protruding into the bladder similar to previous exam. VASCULATURE: Unremarkable. BONES: No acute or aggressive osseous abnormalities identified. OTHER: None. _______________     IMPRESSION: 1. Large prostate protruding into the urinary bladder appears stable. 2. No new masses or lymphadenopathy. Medications ordered:   Medications   iopamidoL (ISOVUE 300) 61 % contrast injection 100 mL (100 mL IntraVENous Given 1/16/21 1312)         Medical Decision Making   Initial Medical Decision Making and DDx:     Concern for an occult malignancy. He had a chest x-ray within the last couple weeks which did not demonstrate any focal abnormality. We will do basic labs about evaluate albumin, kidney function. Evaluate for occult MI. There is no stroke syndrome. Do not suspect intracranial mass. UTI is possible. ED Course: Progress Notes, Reevaluation, and Consults:     3:03 PM Pt reevaluated at this time. Discussed results and findings, as well as, diagnosis and plan for discharge. Follow up with doctors/services listed. Return to the emergency department for alarming symptoms. Pt verbalizes understanding and agreement with plan. All questions addressed. Staff notified me patient is comfortably dressed and says he is going to leave. Prolonged stay in the ER due to crowding.   No actionable findings on his evaluation no malignancy or other alarming process in his abdomen. He does have UTI we will give him prescription for Keflex, discussed with him, return to the emergency department for alarming problems, continue work-up with primary care for weight loss he is happy with this plan ready for discharge. I am the first provider for this patient. I reviewed the vital signs, available nursing notes, past medical history, past surgical history, family history and social history. Patient Vitals for the past 12 hrs:   Temp Pulse Resp BP SpO2   01/16/21 1128  88 19  98 %   01/16/21 1115  91 19 122/72 99 %   01/16/21 1041 97.9 °F (36.6 °C) (!) 108 18 112/80 98 %       Vital Signs-Reviewed the patient's vital signs. Pulse Oximetry Analysis, Cardiac Monitor, 12 lead ekg: No hypoxia on room  Interpreted by the EP. Records Reviewed: Nursing notes reviewed (Time of Review: 12:52 PM)    Procedures:   Critical Care Time:   Aspirin: (was aspirin given for stroke?)    Diagnosis     Clinical Impression:   1. Urinary tract infection without hematuria, site unspecified    2. Type 2 diabetes mellitus with hyperglycemia, without long-term current use of insulin (HCC)    3. Weight loss        Disposition: Discharged      Follow-up Information     Follow up With Specialties Details Why Contact Info    Tam Loyola MD Family Medicine, Internal Medicine In 1 week  [address] [address]  [phone]             Patient's Medications   Start Taking    CEPHALEXIN (KEFLEX) 500 MG CAPSULE    Take 1 Cap by mouth four (4) times daily for 10 days. Continue Taking    FINASTERIDE (PROSCAR) 5 MG TABLET    take 1 tablet by mouth once daily    GLUCOSE BLOOD VI TEST STRIPS (ONETOUCH VERIO) STRIP    Test blood glucose twice a day    INSULIN DETEMIR U-100 (LEVEMIR FLEXTOUCH) 100 UNIT/ML (3 ML) INPN    18 Units by SubCUTAneous route nightly.     INSULIN NEEDLES, DISPOSABLE, 31 GAUGE X 5/16\" NDLE    Give insulin once a day    JARDIANCE 25 MG TABLET take 1 tablet by mouth once daily    MIRTAZAPINE (REMERON) 7.5 MG TABLET    Take 1 Tab by mouth nightly. ONE TOUCH DELICA 33 GAUGE MISC    Test blood glucose 3 times a day    ONETOUCH VERIO SYSTEM MISC        PIOGLITAZONE (ACTOS) 45 MG TABLET    take 1 tablet by mouth once daily    PRAVASTATIN (PRAVACHOL) 40 MG TABLET    Take 1 Tab by mouth nightly. SILDENAFIL CITRATE (VIAGRA) 100 MG TABLET    Take 1 Tab by mouth daily as needed (for ED. No more than 1 tab a day). [id number] (EYE DROPS, WITH POVIDONE,) 0.05-0.1-1-1 % DROP    Apply 2 Drops to eye two (2) times a day.    These Medications have changed    No medications on file   Stop Taking    No medications on file     _______________________________    Notes:    Ismael Faulkner MD using Dragon dictation      _______________________________

## 2021-01-16 NOTE — ED TRIAGE NOTES
Patient co weight loss, lethargy, and being off balance x 1 month. Patient states he has been checked out several times by his PCP for these reasons but is never told what is wrong. Patient had 3 visits to PCP last week but still feeling \"down. \" Patient denies numbness, tingling, or any new/different weakness. States \"I just want to know why I am feeling down all the time. \"   No changes in diet or appetite. States he is eating well at home. Wife reports that PCP has told patient that these symptoms are due to his uncontrolled diabetes.

## 2021-01-17 LAB
ATRIAL RATE: 74 BPM
CALCULATED P AXIS, ECG09: 61 DEGREES
CALCULATED R AXIS, ECG10: 71 DEGREES
CALCULATED T AXIS, ECG11: 66 DEGREES
DIAGNOSIS, 93000: NORMAL
P-R INTERVAL, ECG05: 124 MS
Q-T INTERVAL, ECG07: 350 MS
QRS DURATION, ECG06: 86 MS
QTC CALCULATION (BEZET), ECG08: 388 MS
VENTRICULAR RATE, ECG03: 74 BPM

## 2021-01-25 DIAGNOSIS — M18.0 PRIMARY OSTEOARTHRITIS OF BOTH FIRST CARPOMETACARPAL JOINTS: Primary | ICD-10-CM

## 2021-05-25 ENCOUNTER — OFFICE VISIT (OUTPATIENT)
Dept: FAMILY MEDICINE CLINIC | Age: 86
End: 2021-05-25
Payer: MEDICARE

## 2021-05-25 VITALS
DIASTOLIC BLOOD PRESSURE: 67 MMHG | WEIGHT: 117 LBS | SYSTOLIC BLOOD PRESSURE: 106 MMHG | RESPIRATION RATE: 16 BRPM | TEMPERATURE: 97 F | BODY MASS INDEX: 18.8 KG/M2 | OXYGEN SATURATION: 98 % | HEART RATE: 77 BPM | HEIGHT: 66 IN

## 2021-05-25 DIAGNOSIS — E11.65 TYPE 2 DIABETES MELLITUS WITH HYPERGLYCEMIA, WITHOUT LONG-TERM CURRENT USE OF INSULIN (HCC): ICD-10-CM

## 2021-05-25 DIAGNOSIS — N30.00 ACUTE CYSTITIS WITHOUT HEMATURIA: ICD-10-CM

## 2021-05-25 DIAGNOSIS — N40.1 BENIGN PROSTATIC HYPERPLASIA WITH LOWER URINARY TRACT SYMPTOMS, SYMPTOM DETAILS UNSPECIFIED: ICD-10-CM

## 2021-05-25 DIAGNOSIS — R35.0 FREQUENT URINATION: Primary | ICD-10-CM

## 2021-05-25 DIAGNOSIS — R41.3 MEMORY CHANGES: ICD-10-CM

## 2021-05-25 DIAGNOSIS — E11.65 POORLY CONTROLLED DIABETES MELLITUS (HCC): ICD-10-CM

## 2021-05-25 LAB
BILIRUB UR QL STRIP: NEGATIVE
GLUCOSE POC: NORMAL MG/DL
GLUCOSE UR-MCNC: NORMAL MG/DL
KETONES P FAST UR STRIP-MCNC: NEGATIVE MG/DL
PH UR STRIP: 5.5 [PH] (ref 4.6–8)
PROT UR QL STRIP: NORMAL
SP GR UR STRIP: 1.01 (ref 1–1.03)
UA UROBILINOGEN AMB POC: NORMAL (ref 0.2–1)
URINALYSIS CLARITY POC: NORMAL
URINALYSIS COLOR POC: YELLOW
URINE BLOOD POC: NORMAL
URINE LEUKOCYTES POC: NORMAL
URINE NITRITES POC: POSITIVE

## 2021-05-25 PROCEDURE — 1101F PT FALLS ASSESS-DOCD LE1/YR: CPT | Performed by: FAMILY MEDICINE

## 2021-05-25 PROCEDURE — G8432 DEP SCR NOT DOC, RNG: HCPCS | Performed by: FAMILY MEDICINE

## 2021-05-25 PROCEDURE — 81003 URINALYSIS AUTO W/O SCOPE: CPT | Performed by: FAMILY MEDICINE

## 2021-05-25 PROCEDURE — 99214 OFFICE O/P EST MOD 30 MIN: CPT | Performed by: FAMILY MEDICINE

## 2021-05-25 PROCEDURE — G8427 DOCREV CUR MEDS BY ELIG CLIN: HCPCS | Performed by: FAMILY MEDICINE

## 2021-05-25 PROCEDURE — G8536 NO DOC ELDER MAL SCRN: HCPCS | Performed by: FAMILY MEDICINE

## 2021-05-25 PROCEDURE — G8420 CALC BMI NORM PARAMETERS: HCPCS | Performed by: FAMILY MEDICINE

## 2021-05-25 PROCEDURE — 82962 GLUCOSE BLOOD TEST: CPT | Performed by: FAMILY MEDICINE

## 2021-05-25 RX ORDER — FINASTERIDE 5 MG/1
TABLET, FILM COATED ORAL
Qty: 90 TABLET | Refills: 3 | Status: SHIPPED | OUTPATIENT
Start: 2021-05-25 | End: 2022-01-07 | Stop reason: ALTCHOICE

## 2021-05-25 RX ORDER — BLOOD SUGAR DIAGNOSTIC
STRIP MISCELLANEOUS
Qty: 100 STRIP | Refills: 11 | Status: SHIPPED | OUTPATIENT
Start: 2021-05-25 | End: 2021-11-09 | Stop reason: SDUPTHER

## 2021-05-25 RX ORDER — ALBUTEROL SULFATE 90 UG/1
1 AEROSOL, METERED RESPIRATORY (INHALATION)
Qty: 1 INHALER | Refills: 1 | Status: SHIPPED | OUTPATIENT
Start: 2021-05-25

## 2021-05-25 RX ORDER — CIPROFLOXACIN 500 MG/1
500 TABLET ORAL 2 TIMES DAILY
Qty: 20 TABLET | Refills: 0 | Status: SHIPPED | OUTPATIENT
Start: 2021-05-25 | End: 2021-06-04

## 2021-05-25 RX ORDER — DONEPEZIL HYDROCHLORIDE 10 MG/1
10 TABLET, FILM COATED ORAL
Qty: 30 TABLET | Refills: 0 | Status: SHIPPED | OUTPATIENT
Start: 2021-05-25 | End: 2021-06-18

## 2021-05-25 RX ORDER — PEN NEEDLE, DIABETIC 30 GX3/16"
NEEDLE, DISPOSABLE MISCELLANEOUS
Qty: 90 PEN NEEDLE | Refills: 3 | Status: SHIPPED | OUTPATIENT
Start: 2021-05-25 | End: 2022-09-08 | Stop reason: ALTCHOICE

## 2021-05-25 NOTE — PROGRESS NOTES
Heike Bourgeois is a 81 y.o.  male and presents with    Chief Complaint   Patient presents with    Diabetes     stopped all medications about 4 months ago     Weight Loss       Subjective:  Mr. Adamaris Fuentes presents c/o frequent urination and weight loss. He has stopped using his insulin and reports that he gave up. He denies pain with urination. He has nocturia. Cardiovascular Review:  The patient has diabetes and hyperlipidemia. Diet and Lifestyle: generally follows a low fat low cholesterol diet, generally follows a low sodium diet, does not rigorously follow a diabetic diet, exercises sporadically, former smoker  Home BP Monitoring: is not measured at home. Pertinent ROS: taking medications as instructed, no medication side effects noted, no TIA's, no chest pain on exertion, notes new dyspnea on exertion, no swelling of ankles. Diabetes Mellitus:  He has diabetes mellitus, and  hyperlipidemia. Diabetic ROS - medication compliance: noncompliant all of the time, diabetic diet compliance: noncompliant some of the time. Lab review: labs reviewed, I note that glycosylated hemoglobin abnormal     ROS   Constitutional: Negative for fever; positive for weight loss and night sweats  HENT: Negative for sore throat.    Eyes: Negative for visual disturbance. Respiratory: No shortness of breath. Positive for cough and wheezing.    Cardiovascular: Negative for chest pain. Positive for dyspnea on exertion  Gastrointestinal: positive for abdominal pain which is localized to right inguinal area which is intermittent. He has intermittent loose stool  Endocrine: positive for polyuria. Musculoskeletal: Negative for gait problem. Skin: Negative for rash. Allergic/Immunologic: Negative for immunocompromised state. Neurological: Negative for syncope. Psychiatric/Behavioral: Negative for sleep disturbance.   All other systems reviewed and are negative.     Objective:  Vitals:    05/25/21 1601   BP: 106/67   Pulse: 77   Resp: 16   Temp: 97 °F (36.1 °C)   TempSrc: Temporal   SpO2: 98%   Weight: 117 lb (53.1 kg)   Height: 5' 6\" (1.676 m)   PainSc:   0 - No pain       alert, well appearing, and in no distress, oriented to person, place, and time and thin  Mental status - normal mood, behavior, speech, dress, motor activity, and thought processes  Chest - clear to auscultation, no wheezes, rales or rhonchi, symmetric air entry  Heart - normal rate, regular rhythm, normal S1, S2, no murmurs, rubs, clicks or gallops  Neurological - cranial nerves II through XII intact  LABS   Urinalysis - 3+ glucose, + nitrite + leukocyte esterase  TESTS      Assessment/Plan:    1. Frequent urination  Due to noncompliance with insulin and associated infection  - AMB POC GLUCOSE BLOOD, BY GLUCOSE MONITORING DEVICE  - AMB POC URINALYSIS DIP STICK AUTO W/O MICRO    2. Poorly controlled diabetes mellitus (HCC)  Restart insulin and monitor daily  - AMB POC GLUCOSE BLOOD, BY GLUCOSE MONITORING DEVICE  - AMB POC URINALYSIS DIP STICK AUTO W/O MICRO  - glucose blood VI test strips (OneTouch Verio test strips) strip; Test blood glucose twice a day  Dispense: 100 Strip; Refill: 11    3. Acute cystitis without hematuria  Start abx  - ciprofloxacin HCl (CIPRO) 500 mg tablet; Take 1 Tablet by mouth two (2) times a day for 10 days. Dispense: 20 Tablet; Refill: 0    4. Type 2 diabetes mellitus with hyperglycemia, without long-term current use of insulin ([facility])  Goal hgb a1c <7  - glucose blood VI test strips (OneTouch Verio test strips) strip; Test blood glucose twice a day  Dispense: 100 Strip; Refill: 11  - insulin detemir U-100 (LEVEMIR FLEXTOUCH) 100 unit/mL (3 mL) inpn; 25 Units by SubCUTAneous route nightly. Dispense: 15 Pen; Refill: 4  - Insulin Needles, Disposable, 31 gauge x 5/16\" ndle; Give insulin once a day  Dispense: 90 Pen Needle; Refill: 3    5.  Benign prostatic hyperplasia with lower urinary tract symptoms, symptom details unspecified  Continue treatment  - finasteride (PROSCAR) 5 mg tablet; take 1 tablet by mouth once daily  Dispense: 90 Tablet; Refill: 3    6. Memory changes  Start treatment  - donepeziL (ARICEPT) 10 mg tablet; Take 1 Tablet by mouth nightly. Dispense: 30 Tablet; Refill: 0      Lab review: labs reviewed, I note that urinalysis abnormal, glucose extremely abnormal      I have discussed the diagnosis with the patient and the intended plan as seen in the above orders. The patient has received an after-visit summary and questions were answered concerning future plans. I have discussed medication side effects and warnings with the patient as well. I have reviewed the plan of care with the patient, accepted their input and they are in agreement with the treatment goals.

## 2021-05-25 NOTE — PROGRESS NOTES
Steven Ba presents today for urination and medication evaluation for diabetes  - Is someone accompanying this pt? His wife  - Is the patient using any durable medical equipment during office visit? glucometer    Coordination of Care:  1. Have you been to the ER, urgent care clinic since your last visit? Hospitalized since your last visit? yes    2. Have you seen or consulted any other health care providers outside of the [address] [place] [address] since your last visit?  Yes, Urologist, Dr. Faviola Sellers

## 2021-08-24 ENCOUNTER — HOSPITAL ENCOUNTER (OUTPATIENT)
Dept: LAB | Age: 86
Discharge: HOME | End: 2021-08-24
Payer: MEDICARE

## 2021-08-24 ENCOUNTER — OFFICE VISIT (OUTPATIENT)
Dept: FAMILY MEDICINE CLINIC | Age: 86
End: 2021-08-24
Payer: MEDICARE

## 2021-08-24 VITALS
SYSTOLIC BLOOD PRESSURE: 105 MMHG | WEIGHT: 120 LBS | TEMPERATURE: 98.1 F | OXYGEN SATURATION: 98 % | RESPIRATION RATE: 16 BRPM | HEART RATE: 88 BPM | DIASTOLIC BLOOD PRESSURE: 81 MMHG | BODY MASS INDEX: 19.29 KG/M2 | HEIGHT: 66 IN

## 2021-08-24 DIAGNOSIS — E11.65 POORLY CONTROLLED DIABETES MELLITUS (HCC): ICD-10-CM

## 2021-08-24 DIAGNOSIS — R63.4 UNINTENTIONAL WEIGHT LOSS: ICD-10-CM

## 2021-08-24 DIAGNOSIS — R41.3 MEMORY CHANGES: ICD-10-CM

## 2021-08-24 DIAGNOSIS — Z00.00 MEDICARE ANNUAL WELLNESS VISIT, SUBSEQUENT: Primary | ICD-10-CM

## 2021-08-24 DIAGNOSIS — R68.82 DECREASED LIBIDO: ICD-10-CM

## 2021-08-24 DIAGNOSIS — Z71.89 ADVANCE CARE PLANNING: ICD-10-CM

## 2021-08-24 DIAGNOSIS — Z28.21 REFUSED PNEUMOCOCCAL VACCINATION: ICD-10-CM

## 2021-08-24 LAB
EST. AVERAGE GLUCOSE BLD GHB EST-MCNC: 252 MG/DL
HBA1C MFR BLD: 10.4 % (ref 4.2–5.6)

## 2021-08-24 PROCEDURE — 99214 OFFICE O/P EST MOD 30 MIN: CPT | Performed by: FAMILY MEDICINE

## 2021-08-24 PROCEDURE — G8432 DEP SCR NOT DOC, RNG: HCPCS | Performed by: FAMILY MEDICINE

## 2021-08-24 PROCEDURE — G8420 CALC BMI NORM PARAMETERS: HCPCS | Performed by: FAMILY MEDICINE

## 2021-08-24 PROCEDURE — 36415 COLL VENOUS BLD VENIPUNCTURE: CPT

## 2021-08-24 PROCEDURE — 1101F PT FALLS ASSESS-DOCD LE1/YR: CPT | Performed by: FAMILY MEDICINE

## 2021-08-24 PROCEDURE — 83036 HEMOGLOBIN GLYCOSYLATED A1C: CPT

## 2021-08-24 PROCEDURE — 90000 NO LOS: CPT | Performed by: FAMILY MEDICINE

## 2021-08-24 PROCEDURE — G8536 NO DOC ELDER MAL SCRN: HCPCS | Performed by: FAMILY MEDICINE

## 2021-08-24 PROCEDURE — G8428 CUR MEDS NOT DOCUMENT: HCPCS | Performed by: FAMILY MEDICINE

## 2021-08-24 PROCEDURE — G0439 PPPS, SUBSEQ VISIT: HCPCS | Performed by: FAMILY MEDICINE

## 2021-08-24 RX ORDER — NUT.TX.GLUC.INTOLER,LAC-FR,SOY
1 LIQUID (ML) ORAL 2 TIMES DAILY
Qty: 60 BOTTLE | Refills: 5 | Status: SHIPPED | OUTPATIENT
Start: 2021-08-24 | End: 2022-09-08 | Stop reason: ALTCHOICE

## 2021-08-24 NOTE — PROGRESS NOTES
(AWV) The Medicare Annual Wellness Exam PROGRESS NOTE    This is a Medicare Annual Wellness Exam (AWV)     I have reviewed the patient's medical history in detail and updated the computerized patient record. Ursula Atkinson is a 81 y.o.  male and presents for an annual wellness exam     ROS   Constitutional: Negative for fever; positive for weight loss and night sweats  HENT: Negative for sore throat.    Eyes: Negative for visual disturbance. Respiratory: No shortness of breath. Positive for cough and wheezing.    Cardiovascular: Negative for chest pain. Positive for dyspnea on exertion  Gastrointestinal: positive for abdominal pain which is localized to right inguinal area which is intermittent. He has intermittent loose stool  Endocrine: positive for polyuria. Musculoskeletal: Negative for gait problem. Skin: Negative for rash. Allergic/Immunologic: Negative for immunocompromised state. Neurological: Negative for syncope. Psychiatric/Behavioral: Negative for sleep disturbance.   All other systems reviewed and are negative    History     Past Medical History:   Diagnosis Date    Abdominal pain     Acute cystitis with hematuria     Bladder mass     Bladder pain     BPH (benign prostatic hyperplasia)     BPH with obstruction/lower urinary tract symptoms     Chronic prostatitis     Diabetes ([facility] [address]     ED (erectile dysfunction)     Elevated PSA     Essential hypertension     Hematuria     History of UTI     Hypercholesterolemia     Nocturia     Urinary tract infection, site not specified     Urine leukocytes       Past Surgical History:   Procedure Laterality Date    HX LAPAROTOMY  04/04/2019    HX OTHER SURGICAL  01/21/2015    Dr. Mary Estrada RT Eye surgery    HX UROLOGICAL  2013    TURP x 3 (most recent 2013).     HX VITRECTOMY  04/06/2017    pars plana vitrectomy and membrane peel of left eye    HI ABDOMEN SURGERY PROC UNLISTED      stabbing    HI CYSTOSCOPY,RESEC EJACULATORY DUCT       Current Outpatient Medications   Medication Sig Dispense Refill    donepeziL (ARICEPT) 10 mg tablet take 1 tablet by mouth nightly 90 Tablet 3    albuterol (PROVENTIL HFA, VENTOLIN HFA, PROAIR HFA) 90 mcg/actuation inhaler Take 1 Puff by inhalation every six (6) hours as needed for Wheezing or Cough. 1 Inhaler 1    glucose blood VI test strips (OneTouch Verio test strips) strip Test blood glucose twice a day 100 Strip 11    insulin detemir U-100 (LEVEMIR FLEXTOUCH) 100 unit/mL (3 mL) inpn 25 Units by SubCUTAneous route nightly. 15 Pen 4    finasteride (PROSCAR) 5 mg tablet take 1 tablet by mouth once daily 90 Tablet 3    Insulin Needles, Disposable, 31 gauge x 5/16\" ndle Give insulin once a day 90 Pen Needle 3    Clear Eyes Redness Relief 0.012-0.25 % drop APPLY 2 DROPS TO EYE TWO TIMES A DAY      sildenafil citrate (VIAGRA) 100 mg tablet Take 1 Tab by mouth daily as needed for Erectile Dysfunction. 30 Tab 3    mirtazapine (REMERON) 7.5 mg tablet Take 1 Tab by mouth nightly. 30 Tab 2    Mpebysmdme-Qbexnl65-CIN084-Pov (Eye Drops, with povidone,) 0.05-0.1-1-1 % drop Apply 2 Drops to eye two (2) times a day. 15 mL 1    Jardiance 25 mg tablet take 1 tablet by mouth once daily 90 Tab 3    sildenafil citrate (VIAGRA) 100 mg tablet Take 1 Tab by mouth daily as needed (for ED. No more than 1 tab a day). 30 Tab 2    pioglitazone (ACTOS) 45 mg tablet take 1 tablet by mouth once daily 90 Tab 3    ONE TOUCH DELICA 33 gauge misc Test blood glucose 3 times a day 100 Lancet 4    ONETOUCH VERIO SYSTEM misc       pravastatin (PRAVACHOL) 40 mg tablet Take 1 Tab by mouth nightly.  80 Tab 3     No Known Allergies  Family History   Problem Relation Age of Onset    Diabetes Mother     Kidney Disease Sister      Social History     Tobacco Use    Smoking status: Former Smoker     Packs/day: 0.25     Years: 65.00     Pack years: 16.25     Types: Cigarettes    Smokeless tobacco: Never Used    Tobacco comment: he is cutting down   Substance Use Topics    Alcohol use: Yes     Comment: social     Patient Active Problem List   Diagnosis Code    Hematuria R31.9    Benign prostatic hyperplasia with lower urinary tract symptoms N40.1    Erectile dysfunction N52.9    Elevated PSA R97.20    Diabetes ([address] E11.9    BPH (benign prostatic hyperplasia) N40.0    Hypercholesterolemia E78.00    Type 2 diabetes with nephropathy (HCC) E11.21    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 · Little interest or pleasure in doing things? · Not at all. [0]  · Feeling down, depressed, or hopeless? · Not at all. [0]    Total Score: 0/6  PHQ-2 Assessment Scoring:   A score of 2 or more requires further screening with the PHQ-9    Alcohol Risk Factor Screening:     Men: On any occasion during the past 3 months, have you had more than 4 drinks containing alcohol? no  Do you average more than 14 drinks per week? no    Functional Ability and Level of Safety:     Hearing Loss    Hearing is good. Activities of Daily Living   Self-care. Requires assistance with: no ADLs    Fall Risk   Secondary diagnoses (15 pts)  Score: 15    Abuse Screen   Patient is not abused    Examination   Physical Examination  Vitals:    08/24/21 1059   BP: 105/81   Pulse: 88   Resp: 16   Temp: 98.1 °F (36.7 °C)   TempSrc: Temporal   SpO2: 98%   Weight: 120 lb (54.4 kg)   Height: 5' 6\" (1.676 m)   PainSc:   0 - No pain      Body mass index is 19.37 kg/m².      Evaluation of Cognitive Function:  Mood/affect:good mood  Appearance:well isadora  Family member/caregiver input: n/a    alert, well appearing, and in no distress, oriented to person, place, and time and thin    Patient Care Team:  Jin Rich MD as PCP - General (Family Medicine)  Jin Rich MD as PCP - REHABILITATION [facility] Empaneled Provider  Hunter Duran MD as Consulting Provider (General Surgery)  Lorrine Gosselin, MD (Urology)  Gale Rachel DO (Internal Medicine)  Lurdes Ryder NP (Nurse Practitioner)  Macey Landis DO (General Surgery)    End-of-life planning  Advanced Directive in the case than an injury or illness causes the patient to be unable to make health care decisions    Health Care Directive or Living Will: no    Advice/Referrals/Counselling/Plan:   Education and counseling provided:  End-of-Life planning (with patient's consent)  Pneumococcal Vaccine  Medical nutrition therapy for individuals with diabetes or renal disease  covid vaccine  Include in education list (weight loss, physical activity, smoking cessation, fall prevention, and nutrition)    ICD-10-CM ICD-9-CM    1. Medicare annual wellness visit, subsequent  Z00.00 V70.0 [address] [facility]   2. Advance care planning  Z71.89 V65.49 ADVANCE CARE PLANNING FIRST 30 MINS   3. Poorly controlled diabetes mellitus ([facility] [address]  E11.65 250.00 REFERRAL TO PHARMACIST       DIABETES FOOT EXAM      HEMOGLOBIN A1C WITH EAG   4. Memory changes  R41.3 780.93    5. Unintentional weight loss  R63.4 783.21 nut.tx.gluc.intol,lac-free,soy (Glucerna Shake) liqd   6. Decreased libido  R68.82 799.81    7. Refused pneumococcal vaccination  Z28.21 V64.06    .  Brief written plan, checklist    I have discussed the diagnosis with the patient and the intended plan as seen in the above orders. The patient has received an after-visit summary and questions were answered concerning future plans. I have discussed medication side effects and warnings with the patient as well. I have reviewed the plan of care with the patient, accepted their input and they are in agreement with the treatment goals. ____________________________________________________________    Problem Assessment    for treatment of   Chief Complaint   Patient presents with    Annual Wellness Visit         SUBJECTIVE    Well Adult Physical   Patient here for a comprehensive physical exam.The patient reports problems - diabetes but he is not taking any of his medication; he reports that he does not know what medications to take. He has been prescribed insulin but is not using this. Do you take any herbs or supplements that were not prescribed by a doctor? no Are you taking calcium supplements? no Are you taking aspirin daily? no  Cardiovascular Review:  The patient has diabetes and hyperlipidemia. Diet and Lifestyle: generally follows a low fat low cholesterol diet, generally follows a low sodium diet, does not rigorously follow a diabetic diet, exercises sporadically, former smoker  Home BP Monitoring: is not measured at home. Pertinent ROS: taking medications as instructed, no medication side effects noted, no TIA's, no chest pain on exertion, notes new dyspnea on exertion, no swelling of ankles.   Diabetes Mellitus:  He has diabetes mellitus, and  hyperlipidemia. Diabetic ROS - medication compliance: noncompliant all of the time, diabetic diet compliance: noncompliant some of the time. Lab review: labs reviewed, I note that glycosylated hemoglobin abnormal         Visit Vitals  /81 (BP 1 Location: Right arm, BP Patient Position: Sitting, BP Cuff Size: Adult)   Pulse 88   Temp 98.1 °F (36.7 °C) (Temporal)   Resp 16   Ht 5' 6\" (1.676 m)   Wt 120 lb (54.4 kg)   SpO2 98%   BMI 19.37 kg/m²     General:  Alert, cooperative, no distress, appears stated age. Head:  Normocephalic, without obvious abnormality, atraumatic. Eyes:  Conjunctivae/corneas clear. PERRL, EOMs intact. Fundi benign   Ears:  Normal TMs and external ear canals both ears. Nose: Nares normal. Septum midline. Mucosa normal. No drainage or sinus tenderness. Throat: Lips, mucosa, and tongue normal. Teeth and gums normal.   Neck: Supple, symmetrical, trachea midline, no adenopathy, thyroid: no enlargement/tenderness/nodules, no carotid bruit and no JVD. Back:   Symmetric, no curvature. ROM normal. No CVA tenderness. Lungs:   Clear to auscultation bilaterally. Chest wall:  No tenderness or deformity. Heart:  Regular rate and rhythm, S1, S2 normal, no murmur, click, rub or gallop. Abdomen:   Soft, non-tender. Bowel sounds normal. No masses,  No organomegaly. Genitalia:  Normal male without lesion, discharge or tenderness. Rectal:  Normal tone, normal prostate, no masses or tenderness  Guaiac negative stool. Extremities: Extremities normal, atraumatic, no cyanosis or edema. Pulses: 2+ and symmetric all extremities. Skin: Skin color, texture, turgor normal. No rashes or lesions   Lymph nodes: Cervical, supraclavicular, and axillary nodes normal.   Neurologic: CNII-XII intact. Normal strength, sensation and reflexes throughout. Diabetic foot exam:     Left Foot:   Visual Exam: normal    Pulse DP: 2+ (normal)   Filament test: reduced sensation       Right Foot:   Visual Exam: normal    Pulse DP: 2+ (normal)   Filament test: reduced sensation     LABS     TESTS      Assessment/Plan:    1. Medicare annual wellness visit, subsequent  Reviewed preventive recommendations  - [address] [facility]    2. Advance care planning  See ACP  - ADVANCE CARE PLANNING FIRST 30 MINS    3. Poorly controlled diabetes mellitus ([facility] [address]  Goal hgb a1c <7; encourage compliance; refer to pharmacist for assistance in optimizing his treatment  - [address]  - HEMOGLOBIN A1C WITH EAG; Future    4. Memory changes  Continue current treatment    5.  Unintentional weight loss  Supplemental low carb nutrition ordered  - nut.tx.gluc.intol,lac-free,soy (Glucerna Shake) liqd; Take 1 Bottle by mouth two (2) times a day. Dispense: 60 Bottle; Refill: 5    6. Decreased libido  Encourage exercise    7.  Refused pneumococcal vaccination    Lab review: orders written for new lab studies as appropriate; see orders

## 2021-08-24 NOTE — ACP (ADVANCE CARE PLANNING)
Advance Care Planning     Advance Care Planning (ACP) Physician/NP/PA Conversation      Date of Conversation: 8/24/2021  Conducted with: Patient with Decision Making Capacity    Healthcare Decision Maker:     Primary Decision Maker: Sohail Moon Karolynkareemjudy Ocampo - [place] - [phone]  Click here to complete [address] including selection of the Healthcare Decision Maker Relationship (ie \"Primary\")      Today we documented Decision Maker(s) consistent with ACP documents on file. Care Preferences:    Hospitalization: \"If your health worsens and it becomes clear that your chance of recovery is unlikely, what would be your preference regarding hospitalization? \"  The patient would prefer hospitalization. Ventilation: \"If you were unable to breathe on your own and your chance of recovery was unlikely, what would be your preference about the use of a ventilator (breathing machine) if it was available to you? \"   The patient would desire the use of a ventilator. Resuscitation: \"In the event your heart stopped as a result of an underlying serious health condition, would you want attempts to be made to restart your heart, or would you prefer a natural death? \"   Yes, attempt to resuscitate.     Additional topics discussed: treatment goals, benefit/burden of treatment options, ventilation preferences, hospitalization preferences and resuscitation preferences    Conversation Outcomes / Follow-Up Plan:   ACP in process - information provided, considering goals and options  Reviewed DNR/DNI and patient elects Full Code (Attempt Resuscitation)     Length of Voluntary ACP Conversation in minutes:  16 minutes    Ebonie Ramirez MD

## 2021-08-24 NOTE — PATIENT INSTRUCTIONS
Give 25 units insulin at bedtime  Check blood glucose in the morning  If blood glucose is > 130 for 3 days then increase insulin by 3 units  If blood glucose is < 100 for 3 days then decrease insulin by 3 units

## 2021-08-24 NOTE — PROGRESS NOTES
Victor M Cool presents today for   Chief Complaint   Patient presents with    Annual Wellness Visit       Is someone accompanying this pt? no    Is the patient using any DME equipment during OV? no    Depression Screening:  3 most recent PHQ Screens 8/24/2021   Little interest or pleasure in doing things Not at all   Feeling down, depressed, irritable, or hopeless Not at all   Total Score PHQ 2 0       Learning Assessment:  Learning Assessment 6/28/2019   PRIMARY LEARNER Patient   PRIMARY LANGUAGE ENGLISH   LEARNER PREFERENCE PRIMARY LISTENING   ANSWERED BY patient   RELATIONSHIP SELF       Abuse Screening:  Abuse Screening Questionnaire 5/25/2021   Do you ever feel afraid of your partner? N   Are you in a relationship with someone who physically or mentally threatens you? N   Is it safe for you to go home? Y       Fall Risk  Fall Risk Assessment, last 12 mths 8/24/2021   Able to walk? Yes   Fall in past 12 months? 0   Do you feel unsteady? 0   Are you worried about falling 0   Is TUG test greater than 12 seconds? -   Is the gait abnormal? -   Number of falls in past 12 months -   Fall with injury? -       Health Maintenance reviewed and discussed and ordered per Provider. Health Maintenance Due   Topic Date Due    Eye Exam Retinal or Dilated  Never done    COVID-19 Vaccine (1) Never done    DTaP/Tdap/Td series (1 - Tdap) Never done    Shingrix Vaccine Age 50> (1 of 2) Never done    Pneumococcal 65+ years (1 of 1 - PPSV23) Never done    Foot Exam Q1  07/02/2021    Medicare Yearly Exam  07/03/2021   . Coordination of Care:  1. Have you been to the ER, urgent care clinic since your last visit? Hospitalized since your last visit? no    2. Have you seen or consulted any other health care providers outside of the [address] [place] [address] since your last visit? Include any pap smears or colon screening.  no      Last  Checked na  Last UDS Checked na  Last Pain contract signed: na    Annual Medicare Wellness Exam  Weight loss, and med eval

## 2021-08-25 ENCOUNTER — TELEPHONE (OUTPATIENT)
Dept: FAMILY MEDICINE CLINIC | Age: 86
End: 2021-08-25

## 2021-08-25 NOTE — TELEPHONE ENCOUNTER
Called patient to schedule appointment for PharmD diabetes education and management per referral from PCP Rick Russell MD. Confirmed patient's .      Scheduled patient for PharmD appointment on 2021 at [address] [place] [address] in place: No   Recommendation Provided To: Patient/Caregiver: 1 via Telephone   Intervention Detail: Scheduled Appointment   Gap Closed?: No   Intervention Accepted By: Patient/Caregiver: 1   Time Spent (min): 5

## 2021-09-07 ENCOUNTER — OFFICE VISIT (OUTPATIENT)
Dept: FAMILY MEDICINE CLINIC | Age: 86
End: 2021-09-07

## 2021-09-07 DIAGNOSIS — E11.65 POORLY CONTROLLED DIABETES MELLITUS (HCC): Primary | ICD-10-CM

## 2021-09-07 NOTE — Clinical Note
Please see note regarding recommendations. I will help patient with insulin dose titration you provided him at last visit (3 units every 3 days until BS < 130), as self-titration may not be a viable option in this patient as he was unable to perform a correct dose calculation given those directions and still expressed confusion with directions even after I explained them again to him.

## 2021-09-07 NOTE — PATIENT INSTRUCTIONS
Your Visit Summary:   - Call me at [phone] tomorrow with your blood sugar reading and we will discuss a plan. Check and document your blood sugar every morning  Bring your meter/log to all future visits. Your blood sugar goals:  - Fasting (first thing in the morning)  blood sugar: 80 - 130   - 1 to 2 hours after a meal: less than 180     If you experience symptoms of low blood sugar (example: less than 70):  - Confirm low reading by checking your blood sugar.   - Then treat with 15 grams of carbohydrates (one-half cup of juice or regular soda, or 4-5 glucose tablets). - Wait 15 minutes to recheck blood sugar.   - Then eat a protein containing meal/snack to prevent another low blood sugar episode. (example: peanut butter + crackers)    Other recommendations:  - Schedule an annual eye exam.  - Check your feet daily for any signs of open wounds, cuts, or sores. - Given your risk factors, the following vaccines are recommended: annual flu shot and pneumococcal vaccine (Pneumovax)      In addition to taking your medications as directed, improving your blood sugar involves modifying your nutrition and maximizing the amount of physical activity. Nutrition:  - When reviewing a nutrition label, focus on the serving size, total calories, fat (and type of fats), total carbohydrates, sugar (and amount of added sugar), amount of fiber (good for your digestive), and amount of protein. Refer to your nutrition label guide for more information.  - For a meal : max 45 - 60 grams of carbohydrates  - For a snack: max 15 grams of carbohydrates  - Reduce amount of saturated and trans fat. Consider more unsaturated fat options as they are better for your heart health. - Have at least 1 serving of lean protein with each meal.    - Increase fiber intake slowly to prevent constipation.   - Substitute fruit juices for the whole fruit.     Low carb snack ideas (15 grams total carb or less):   String cheese or babybel with 6 crackers   4 peanut butter crackers   3 cups of popcorn   1 cup raw vegetables with hummus or ranch dip (just need to watch how much dip you use)   Nuts   2 rice cakes   Celery with peanut butter or cream cheese   String cheese with 1 serving of fruit   Greek yogurt (look at label to make sure < 15 gram carb)   Plain greek yogurt with fresh berries added   Nature valley protein bar   Whisps parmesan cheese crisps   Hard boiled egg   Cottage cheese   Tuna salad lettuce roll-ups   Deli meat roll-ups with slice of cheese   Sugar Free Jello   Glucerna shake (16 grams)    Glucerna hunger smart shake (16 grams)   Ensure protein max shake   Fruit (1 serving/15 grams)   1/2 banana, sol, or grapefruit    1/3 melon (small cantaloupe)   1 slice or 1 cup of honeydew melon   1 slice or 1 and 1/4 cups of watermelon    1 small apple, peach, orange or pear   2 small tangerines   1 cup of raspberries   3/4 cup of blackberries, blueberries or pineapples   1/2 cup of fruit juice, pears, applesauce, or mangos   17 small grapes   12 cherries    Be careful with the glucerna products as they differ in the total carbs depending on the product (some are intended as meal replacements not snacks). Make sure you look at the total carbs on the label as products can differ. Physical Activity:  - Aim for 30 minutes of consistent, moderately intensive, physical activity a day for 5 days or an average of 150 minutes per week. - Start slow, increase as tolerated. For example: Walk every day, working up to 30 minutes of brisk walking, 5 days a week--or split the 30 minutes into two 15-minute or three 10-minute walks. - If you sit for a long time, get up and move/stretch every 90 minutes.

## 2021-09-07 NOTE — PROGRESS NOTES
Pharmacy Progress Note - Diabetes Management    S/O: Mr. Reagan Jones is a 81 y.o. male, referred by Dr. Sundeep Andersen MD, was seen today for diabetes management and education visit. Patient's last A1c was 10.4% in August 2021. Brief History: Patient has a history of non-compliance to diabetes medications and blood glucose monitoring. States that before his appointment with Dr. Telma Forrest on 8/24, he had not been taking his medications for over 4 months. States the reason for this is that he feels better when he doesn't take his medications. Patient could not explain what feels different, just that he feels worse with medications. Patient restarted all medications about 2 weeks ago. Patient was given instructions at last PCP visit to test blood glucose each morning and titrate insulin every 3 days until BS < 130. However, pt has not tested blood sugars at home since June 1, 2021 and has not increased his insulin dose. When we discussed the titration schedule, patient was not able to perform the calculation of adding 3 units to his current 25 units correctly. Current anti-hyperglycemic regimen includes:    - Levemir 25 units at bedtime  - Jardiance 25 mg daily  - Pioglitazone 45 mg daily    Patient has a history of nonadherence, started taking all medications again about 2 weeks ago. States he takes all of his pills every morning and insulin at night. However, patient admits he forgot to take his insulin last night because he fell asleep. ROS:  Today, Pt endorses:  - Symptoms of Hyperglycemia: polyuria. States he got up \"50 times\" last night to urinate. - Symptoms of Hypoglycemia: none    Self Monitoring Blood Glucose (SMBG) or CGM:  - Patient brought in his glucometer and log today. - Patient has not tested BS since June 1st. Patient tested his blood sugar in office today and got a fasting reading of 256 mg/dL.     Nutrition/Lifestyle Modifications:  Diet:  - Patient states he does not follow a diet and eats \"whatever he wants\". Patient does not limit carb intake. Patient concerned with his weight loss despite \"eating all day long\". Explained how this is likely related his diabetes and insufficient insulin. Labs/Vitals: Wt Readings from Last 3 Encounters:   08/24/21 120 lb (54.4 kg)   05/25/21 117 lb (53.1 kg)   02/16/21 115 lb (52.2 kg)     BP Readings from Last 3 Encounters:   08/24/21 105/81   05/25/21 106/67   01/16/21 122/72       Lab Results   Component Value Date/Time    Sodium 135 (L) 01/16/2021 10:50 AM    Potassium 5.0 01/16/2021 10:50 AM    Chloride 104 01/16/2021 10:50 AM    CO2 23 01/16/2021 10:50 AM    Anion gap 8 01/16/2021 10:50 AM    Glucose 334 (H) 01/16/2021 10:50 AM    BUN 23 (H) 01/16/2021 10:50 AM    Creatinine 1.11 01/16/2021 10:50 AM    BUN/Creatinine ratio 21 (H) 01/16/2021 10:50 AM    GFR est AA >60 01/16/2021 10:50 AM    GFR est non-AA >60 01/16/2021 10:50 AM    Calcium 8.5 01/16/2021 10:50 AM    Bilirubin, total 0.5 01/16/2021 10:50 AM    Alk. phosphatase 107 01/16/2021 10:50 AM    Protein, total 7.1 01/16/2021 10:50 AM    Albumin 3.0 (L) 01/16/2021 10:50 AM    Globulin 4.1 (H) 01/16/2021 10:50 AM    A-G Ratio 0.7 (L) 01/16/2021 10:50 AM    ALT (SGPT) 27 01/16/2021 10:50 AM       Lab Results   Component Value Date/Time    Cholesterol, total 160 01/07/2021 11:45 AM    HDL Cholesterol 67 (H) 01/07/2021 11:45 AM    LDL, calculated 76 01/07/2021 11:45 AM    VLDL, calculated 17 01/07/2021 11:45 AM    Triglyceride 85 01/07/2021 11:45 AM    CHOL/HDL Ratio 2.4 01/07/2021 11:45 AM       HbA1c:  Lab Results   Component Value Date/Time    Hemoglobin A1c 10.4 (H) 08/24/2021 12:07 PM    Hemoglobin A1c (POC) 10.6 07/02/2020 04:51 PM       A/P:    Diabetes Management:  - Per ADA guidelines, Pt's A1c is not at goal of < 7%, although a goal of < 8% for this patient is reasonable given his age and concerns for hypoglycemia given that patient lives alone.    - Patients FBG in office today was 256 mg/dL, patient did miss his insulin dose last night. Given patients inability to calculate the new insulin dose using self-titration directions provided at his last PCP visit, I am unsure of the safety of the patient titrating his insulin on his own. Since FBG remains very elevated today, encouraged patient to follow Dr. Gunnar Tatum directions and increase insulin to 28 units as directed. I will contact patient 1-2 times a week to assist with titration schedule provided by Dr. Andres Arreola for the safety of the patient. Patient wants to call me in the morning to discuss blood sugar and a plan moving forward. Provided patient with my contact information.  - Recommend checking every day in the morning and keeping a log.  - Given that patient is already experiencing polyuria due to hyperglycemia, I fear that [place] Peto is worsening this issue for the patient and may not be providing much additional glycemic benefit. Recommend reducing Jardiance dose to 10 mg daily. If patient continues to have bothersome urinary symptoms despite lower dose and improved glycemic control from insulin titration, recommend discontinuing Jardiance. Nutrition/Lifestyle Modifications:  - Did not go into in depth discussion of diet at this time, as I feel it more important to focus on medication compliance and insulin titration in this patient. Will discuss diet more at a later visit. - Provided written information on nutrition labels, portion sizes, and food groups to assist patient with meal planning. There are no discontinued medications. Patient verbalized understanding of the information presented and all of the patients questions were answered. AVS was handed to the patient. Patient advised to call the office with any additional questions or concerns. Notifications of recommendations will be sent to Dr. Martine Conway MD for review.     Will follow-up with patient 1-2 times a week via telephone for the next several weeks to assist with insulin titration.     Thank you,  Florencio Hunt, PharmD      For Pharmacy Admin Tracking Only     CPA in place: No   Recommendation Provided To: Provider: 2 via Note to Provider  and Patient/Caregiver: 1 via In person   Intervention Detail: Dose Adjustment: 2, reason: Therapy Optimization and Scheduled Appointment   Gap Closed?: No   Intervention Accepted By: Provider: 0 and Patient/Caregiver: 1   Time Spent (min): 60

## 2021-09-08 ENCOUNTER — TELEPHONE (OUTPATIENT)
Dept: FAMILY MEDICINE CLINIC | Age: 86
End: 2021-09-08

## 2021-09-08 DIAGNOSIS — E11.65 POORLY CONTROLLED DIABETES MELLITUS (HCC): Primary | ICD-10-CM

## 2021-09-08 NOTE — TELEPHONE ENCOUNTER
Pharmacy Progress Note - Telephone Encounter    S/O: Mr. Gogo Baker 81 y.o. male contacted me via an inbound telephone call to provide his fasting blood glucose reading today. - FBG this mornin mg/dL   - Patient reports taking 28 units last night per titration directions provided by Dr. Rubén Pickard at last PCP visit (directions provided to patient by PCP: Check blood glucose in the morning. If blood glucose is > 130 for 3 days then increase insulin by 3 units. If blood glucose is < 100 for 3 days then decrease insulin by 3 units). A/P:  - Instructed patient to continue taking 28 units x 3 days per titration schedule. Patient to call me on Friday with his FBG readings so I can continue to assist patient with safely titrating insulin dosing, as he does not feel confident doing it by himself. These frequent contacts will also assist patient with adherence, as he has a history of non-adherence to his medications. - Patient endorses understanding to the provided information. All questions answered at this time.      Thank you,  Anthony Chen, PharmD      For Pharmacy Admin Tracking Only     CPA in place: No   Recommendation Provided To: Patient/Caregiver: 1 via Telephone   Intervention Detail: Adherence Monitorin   Gap Closed?: No   Intervention Accepted By: Patient/Caregiver: 1   Time Spent (min): 10

## 2021-09-10 ENCOUNTER — TELEPHONE (OUTPATIENT)
Dept: FAMILY MEDICINE CLINIC | Age: 86
End: 2021-09-10

## 2021-09-10 NOTE — TELEPHONE ENCOUNTER
Pharmacy Progress Note - Telephone Call    Mr. Gogo Baker 81 y.o. was contacted via an outbound telephone call regarding blood glucose readings today. Patient called this morning to provide me with his readings to help with his insulin dose adjusment, but I was not available and missed his call. A voicemail was left for patient to return my call.      Thank you,  Anthony Chen, [facility] in place: No   Time Spent (min): 5

## 2021-09-13 ENCOUNTER — TELEPHONE (OUTPATIENT)
Dept: FAMILY MEDICINE CLINIC | Age: 86
End: 2021-09-13

## 2021-09-13 NOTE — TELEPHONE ENCOUNTER
Pharmacy Progress Note - Telephone Call    Mr. Ness Romeo 81 y.o. was contacted via an outbound telephone call to follow-up on fasting blood sugar readings today. A voicemail was left for patient to return my call.        Thank you,  Rose Blanco [place]

## 2021-09-16 NOTE — TELEPHONE ENCOUNTER
Pharmacy Progress Note - Telephone Call    Attempted to contact Mr. Cherylene Edison 81 y.o. again today via an outbound telephone call to follow-up on fasting blood sugar readings. A voicemail was left for patient to return my call.      Thank you,  Rachael Renee, PharmD

## 2021-09-24 ENCOUNTER — OFFICE VISIT (OUTPATIENT)
Dept: FAMILY MEDICINE CLINIC | Age: 86
End: 2021-09-24
Payer: MEDICARE

## 2021-09-24 VITALS
TEMPERATURE: 97.8 F | SYSTOLIC BLOOD PRESSURE: 131 MMHG | WEIGHT: 119 LBS | OXYGEN SATURATION: 98 % | BODY MASS INDEX: 19.13 KG/M2 | HEART RATE: 86 BPM | HEIGHT: 66 IN | DIASTOLIC BLOOD PRESSURE: 69 MMHG | RESPIRATION RATE: 16 BRPM

## 2021-09-24 DIAGNOSIS — N40.1 BENIGN PROSTATIC HYPERPLASIA WITH LOWER URINARY TRACT SYMPTOMS, SYMPTOM DETAILS UNSPECIFIED: ICD-10-CM

## 2021-09-24 DIAGNOSIS — E11.65 POORLY CONTROLLED DIABETES MELLITUS (HCC): Primary | ICD-10-CM

## 2021-09-24 DIAGNOSIS — R35.0 FREQUENT URINATION: ICD-10-CM

## 2021-09-24 DIAGNOSIS — R41.3 MEMORY CHANGES: ICD-10-CM

## 2021-09-24 LAB — HBA1C MFR BLD HPLC: 9.8 %

## 2021-09-24 PROCEDURE — G8536 NO DOC ELDER MAL SCRN: HCPCS | Performed by: FAMILY MEDICINE

## 2021-09-24 PROCEDURE — 99214 OFFICE O/P EST MOD 30 MIN: CPT | Performed by: FAMILY MEDICINE

## 2021-09-24 PROCEDURE — 1101F PT FALLS ASSESS-DOCD LE1/YR: CPT | Performed by: FAMILY MEDICINE

## 2021-09-24 PROCEDURE — G8420 CALC BMI NORM PARAMETERS: HCPCS | Performed by: FAMILY MEDICINE

## 2021-09-24 PROCEDURE — G8428 CUR MEDS NOT DOCUMENT: HCPCS | Performed by: FAMILY MEDICINE

## 2021-09-24 PROCEDURE — G8510 SCR DEP NEG, NO PLAN REQD: HCPCS | Performed by: FAMILY MEDICINE

## 2021-09-24 PROCEDURE — 83036 HEMOGLOBIN GLYCOSYLATED A1C: CPT | Performed by: FAMILY MEDICINE

## 2021-09-24 NOTE — PROGRESS NOTES
Theressa Galeazzi is a 81 y.o.  male and presents with    Chief Complaint   Patient presents with    Diabetes    Memory Loss    Weight Loss     Subjective:  He is following up for memory difficulties after starting donapezil; he recently filled the medication. Diabetes Mellitus:  He has diabetes mellitus, and  hyperlipidemia. He was referred to pharmacist for diabetes management but he did not answer his phone. Diabetic ROS - medication compliance: noncompliant all of the time, diabetic diet compliance: noncompliant some of the time. Lab review: labs reviewed, I note that glycosylated hemoglobin abnormal    ROS   Constitutional: Negative for fever; positive for weight loss and night sweats  HENT: Negative for sore throat.    Eyes: Negative for visual disturbance. Respiratory: No shortness of breath. Positive for cough and wheezing.    Cardiovascular: Negative for chest pain. Positive for dyspnea on exertion  Gastrointestinal: positive for abdominal pain which is localized to right inguinal area which is intermittent. He has intermittent loose stool  Endocrine: positive for polyuria. Musculoskeletal: Negative for gait problem. Skin: Negative for rash. Allergic/Immunologic: Negative for immunocompromised state. Neurological: Negative for syncope. Psychiatric/Behavioral: Negative for sleep disturbance  All other systems reviewed and are negative.       Objective:  Vitals:    09/24/21 1043   BP: 131/69   Pulse: 86   Resp: 16   Temp: 97.8 °F (36.6 °C)   TempSrc: Temporal   SpO2: 98%   Weight: 119 lb (54 kg)   Height: 5' 6\" (1.676 m)   PainSc:   0 - No pain       alert, well appearing, and in no distress, oriented to person, place, and time and normal appearing weight  Mental status - normal mood, behavior, speech, dress, motor activity, and thought processes  Chest - clear to auscultation, no wheezes, rales or rhonchi, symmetric air entry  Heart - normal rate, regular rhythm, normal S1, S2, no murmurs, rubs, clicks or gallops  Extremities - peripheral pulses normal, no pedal edema, no clubbing or cyanosis  Skin - normal coloration and turgor, no rashes, no suspicious skin lesions noted    LABS   hgb a1c 9.8  TESTS      Assessment/Plan:    1. Poorly controlled diabetes mellitus ([facility] [address]  Goal hgb a1c <7; pt needs better control; refer to pharmacist for assistance in complying with medications and optimizing  - AMB POC HEMOGLOBIN A1C    2. Memory changes  Improved per patient with aricept    3. Frequent urination  Continue current treatment    4. Benign prostatic hyperplasia with lower urinary tract symptoms, symptom details unspecified  Continue current treatment      Lab review: labs reviewed, I note that glycosylated hemoglobin abnormal but improved      I have discussed the diagnosis with the patient and the intended plan as seen in the above orders. The patient has received an after-visit summary and questions were answered concerning future plans. I have discussed medication side effects and warnings with the patient as well. I have reviewed the plan of care with the patient, accepted their input and they are in agreement with the treatment goals.

## 2021-09-24 NOTE — PROGRESS NOTES
Jareth Turpin presents today for   Chief Complaint   Patient presents with    Diabetes    Memory Loss    Weight Loss       Is someone accompanying this pt? no    Is the patient using any DME equipment during OV? no    Depression Screening:  3 most recent PHQ Screens 9/24/2021   Little interest or pleasure in doing things Not at all   Feeling down, depressed, irritable, or hopeless Not at all   Total Score PHQ 2 0       Learning Assessment:  Learning Assessment 6/28/2019   PRIMARY LEARNER Patient   PRIMARY LANGUAGE ENGLISH   LEARNER PREFERENCE PRIMARY LISTENING   ANSWERED BY patient   RELATIONSHIP SELF       Abuse Screening:  Abuse Screening Questionnaire 5/25/2021   Do you ever feel afraid of your partner? N   Are you in a relationship with someone who physically or mentally threatens you? N   Is it safe for you to go home? Y       Fall Risk  Fall Risk Assessment, last 12 mths 9/24/2021   Able to walk? Yes   Fall in past 12 months? 0   Do you feel unsteady? 0   Are you worried about falling 0   Is TUG test greater than 12 seconds? -   Is the gait abnormal? -   Number of falls in past 12 months -   Fall with injury? -       Health Maintenance reviewed and discussed and ordered per Provider. Health Maintenance Due   Topic Date Due    Eye Exam Retinal or Dilated  Never done    DTaP/Tdap/Td series (1 - Tdap) Never done    Shingrix Vaccine Age 50> (1 of 2) Never done    Pneumococcal 65+ years (1 of 1 - PPSV23) Never done    Flu Vaccine (1) Never done   . Coordination of Care:  1. Have you been to the ER, urgent care clinic since your last visit? Hospitalized since your last visit? no    2. Have you seen or consulted any other health care providers outside of the [address] [place] [address] since your last visit? Include any pap smears or colon screening. no      Last  Checked na  Last UDS Checked na  Last Pain contract signed: na    Patient presents in office today for routine care.   Patient concerns: weight loss, and memory loss

## 2021-11-09 DIAGNOSIS — E11.65 TYPE 2 DIABETES MELLITUS WITH HYPERGLYCEMIA, WITHOUT LONG-TERM CURRENT USE OF INSULIN (HCC): ICD-10-CM

## 2021-11-09 DIAGNOSIS — E11.65 POORLY CONTROLLED DIABETES MELLITUS (HCC): ICD-10-CM

## 2021-11-09 RX ORDER — BLOOD SUGAR DIAGNOSTIC
STRIP MISCELLANEOUS
Qty: 100 STRIP | Refills: 11 | Status: SHIPPED | OUTPATIENT
Start: 2021-11-09 | End: 2021-11-16 | Stop reason: SDUPTHER

## 2021-11-09 NOTE — TELEPHONE ENCOUNTER
Patient called requesting medication refill, please advise    Requested Prescriptions     Pending Prescriptions Disp Refills    glucose blood VI test strips (OneTouch Verio test strips) strip 100 Strip 11     Sig: Test blood glucose twice a day

## 2021-11-16 DIAGNOSIS — E11.65 TYPE 2 DIABETES MELLITUS WITH HYPERGLYCEMIA, WITHOUT LONG-TERM CURRENT USE OF INSULIN (HCC): ICD-10-CM

## 2021-11-16 DIAGNOSIS — E11.65 POORLY CONTROLLED DIABETES MELLITUS (HCC): ICD-10-CM

## 2021-11-16 RX ORDER — BLOOD SUGAR DIAGNOSTIC
STRIP MISCELLANEOUS
Qty: 100 STRIP | Refills: 11 | Status: SHIPPED | OUTPATIENT
Start: 2021-11-16 | End: 2022-08-30 | Stop reason: ALTCHOICE

## 2021-11-24 ENCOUNTER — OFFICE VISIT (OUTPATIENT)
Dept: FAMILY MEDICINE CLINIC | Age: 86
End: 2021-11-24
Payer: MEDICARE

## 2021-11-24 VITALS
RESPIRATION RATE: 16 BRPM | DIASTOLIC BLOOD PRESSURE: 76 MMHG | HEIGHT: 66 IN | TEMPERATURE: 97 F | OXYGEN SATURATION: 98 % | BODY MASS INDEX: 18.42 KG/M2 | WEIGHT: 114.6 LBS | SYSTOLIC BLOOD PRESSURE: 119 MMHG | HEART RATE: 93 BPM

## 2021-11-24 DIAGNOSIS — R31.0 GROSS HEMATURIA: ICD-10-CM

## 2021-11-24 DIAGNOSIS — R30.0 DYSURIA: Primary | ICD-10-CM

## 2021-11-24 DIAGNOSIS — E11.65 POORLY CONTROLLED DIABETES MELLITUS (HCC): ICD-10-CM

## 2021-11-24 DIAGNOSIS — L85.3 DRY SKIN: ICD-10-CM

## 2021-11-24 LAB
BILIRUB UR QL STRIP: NEGATIVE
GLUCOSE UR-MCNC: NORMAL MG/DL
HBA1C MFR BLD HPLC: 13.2 %
KETONES P FAST UR STRIP-MCNC: NEGATIVE MG/DL
PH UR STRIP: 5.5 [PH] (ref 4.6–8)
PROT UR QL STRIP: NORMAL
SP GR UR STRIP: 1.02 (ref 1–1.03)
UA UROBILINOGEN AMB POC: NORMAL (ref 0.2–1)
URINALYSIS CLARITY POC: CLEAR
URINALYSIS COLOR POC: YELLOW
URINE BLOOD POC: NORMAL
URINE LEUKOCYTES POC: NORMAL
URINE NITRITES POC: NEGATIVE

## 2021-11-24 PROCEDURE — 1101F PT FALLS ASSESS-DOCD LE1/YR: CPT | Performed by: FAMILY MEDICINE

## 2021-11-24 PROCEDURE — 99215 OFFICE O/P EST HI 40 MIN: CPT | Performed by: FAMILY MEDICINE

## 2021-11-24 PROCEDURE — G8536 NO DOC ELDER MAL SCRN: HCPCS | Performed by: FAMILY MEDICINE

## 2021-11-24 PROCEDURE — G8427 DOCREV CUR MEDS BY ELIG CLIN: HCPCS | Performed by: FAMILY MEDICINE

## 2021-11-24 PROCEDURE — 83036 HEMOGLOBIN GLYCOSYLATED A1C: CPT | Performed by: FAMILY MEDICINE

## 2021-11-24 PROCEDURE — G8420 CALC BMI NORM PARAMETERS: HCPCS | Performed by: FAMILY MEDICINE

## 2021-11-24 PROCEDURE — G8510 SCR DEP NEG, NO PLAN REQD: HCPCS | Performed by: FAMILY MEDICINE

## 2021-11-24 PROCEDURE — 81001 URINALYSIS AUTO W/SCOPE: CPT | Performed by: FAMILY MEDICINE

## 2021-11-24 RX ORDER — TRIAMCINOLONE ACETONIDE 5 MG/G
CREAM TOPICAL 2 TIMES DAILY
Qty: 454 G | Refills: 0 | Status: SHIPPED | OUTPATIENT
Start: 2021-11-24 | End: 2021-11-30

## 2021-11-24 NOTE — PROGRESS NOTES
Jose R Sr is a 81 y.o.  male and presents with    Chief Complaint   Patient presents with    Diabetes    Blood in Urine       Subjective:  He is following up for diabetes; he has been monitoring his blood glucose and has had 114 and 140 in the AM .  Nighttime 397. He is taking medication as prescribed. He c/o nocturia and blood in his urine; he was last seen by urology 3 weeks ago. He has been prescribed antibiotics but is not taking them at this time. He has had urinary tract infection earlier this year. He had resolution of symptoms after treatment. He had a couple of days of burning with urination last week  . Diabetes Mellitus:  He has diabetes mellitus, and  hyperlipidemia. He has had referral to pharmacist for diabetes management. Diabetic ROS - medication compliance: noncompliant all of the time, diabetic diet compliance: noncompliant some of the time. He has new blood glucose monitor and needs assistance in performing the testing  Lab review: labs reviewed, I note that glycosylated hemoglobin abnormal     ROS   Constitutional: Negative for fever; positive night sweats; no weight changes  HENT: Negative for sore throat.    Eyes: Negative for visual disturbance. Respiratory: No shortness of breath. Positive for cough and wheezing.    Cardiovascular: Negative for chest pain. Positive for dyspnea on exertion  Gastrointestinal: positive for abdominal pain which is localized to right inguinal area which is intermittent. He has intermittent loose stool  Endocrine: positive for polyuria. Musculoskeletal: Negative for gait problem. Skin: Negative for rash. Allergic/Immunologic: Negative for immunocompromised state. Neurological: Negative for syncope. Psychiatric/Behavioral: Negative for sleep disturbance  All other systems reviewed and are negative.     Objective:  Vitals:    11/24/21 0818   BP: 119/76   Pulse: 93   Resp: 16   Temp: 97 °F (36.1 °C)   TempSrc: Temporal SpO2: 98%   Weight: 114 lb 9.6 oz (52 kg)   Height: 5' 6\" (1.676 m)   PainSc:   0 - No pain       alert, well appearing, and in no distress, oriented to person, place, and time and thin  Mental status - normal mood, behavior, speech, dress, motor activity, and thought processes  Abdomen - soft, nontender, nondistended, no masses or organomegaly  Back exam - full range of motion, no tenderness, palpable spasm or pain on motion  Skin - hyperpigmented patch on left lower leg    LABS   Urinalysis + blood, 3+ glucose; no nitrates  hgb a1c 13.2  Glucose 80  TESTS      Assessment/Plan:    1. Dysuria  Last episode last week; no nitrates on U/A. F/u with urology as scheduled  - AMB POC URINALYSIS DIP STICK AUTO W/ MICRO    2. Poorly controlled diabetes mellitus ([facility] [address]  Goal hgb a1c <7; increased to 13.2 over the past 2 months up from 9.8; he is instructed to increase his basal insulin to 30 units. GLP 1 ordered for daily use. He will f/u in 2 weeks; home nurse requested to evaluate pt's compliance  - AMB POC HEMOGLOBIN A1C    3. Gross hematuria  Last episode a week ago; continue to monitor    4. Dry skin  Start topical treatment  - triamcinolone (ARISTOCORT) 0.5 % topical cream; Apply  to affected area two (2) times a day. use thin layer  Dispense: 454 g; Refill: 0    Lab review: labs reviewed, I note that glycosylated hemoglobin abnormal, urinalysis abnormal      I have discussed the diagnosis with the patient and the intended plan as seen in the above orders. The patient has received an after-visit summary and questions were answered concerning future plans. I have discussed medication side effects and warnings with the patient as well. I have reviewed the plan of care with the patient, accepted their input and they are in agreement with the treatment goals.        On this date 11/24/2021 I have spent 50 minutes reviewing previous notes, test results and face to face (virtual) with the patient discussing the diagnosis and importance of compliance with the treatment plan as well as documenting on the day of the visit.     Michael Padilla MD

## 2021-11-24 NOTE — Clinical Note
He was on the right track but has now bumped up to 13. 2.  he is instructed to increase his basal insulin to 30. I will start victoza daily.

## 2021-11-24 NOTE — PROGRESS NOTES
Manju Lino is a 81 y.o. male (: 1935) presenting to address:  Patient DECLINED flu vaccine. Chief Complaint   Patient presents with    Follow-up       There were no vitals filed for this visit. Hearing/Vision:   No exam data present    Learning Assessment:     Learning Assessment 2019   PRIMARY LEARNER Patient   PRIMARY LANGUAGE ENGLISH   LEARNER PREFERENCE PRIMARY LISTENING   ANSWERED BY patient   RELATIONSHIP SELF     Depression Screening:     3 most recent PHQ Screens 2021   Little interest or pleasure in doing things Not at all   Feeling down, depressed, irritable, or hopeless Not at all   Total Score PHQ 2 0     Fall Risk Assessment:     Fall Risk Assessment, last 12 mths 2021   Able to walk? Yes   Fall in past 12 months? 0   Do you feel unsteady? 0   Are you worried about falling 0   Is TUG test greater than 12 seconds? -   Is the gait abnormal? -   Number of falls in past 12 months -   Fall with injury? -     Abuse Screening:     Abuse Screening Questionnaire 2021   Do you ever feel afraid of your partner? N   Are you in a relationship with someone who physically or mentally threatens you? N   Is it safe for you to go home? Y     ADL Assessment:     ADL Assessment 2019   Feeding yourself No Help Needed   Getting from bed to chair No Help Needed   Getting dressed No Help Needed   Bathing or showering No Help Needed   Walk across the room (includes cane/walker) No Help Needed   Using the telphone No Help Needed   Taking your medications No Help Needed   Preparing meals No Help Needed   Managing money (expenses/bills) No Help Needed   Moderately strenuous housework (laundry) No Help Needed   Shopping for personal items (toiletries/medicines) No Help Needed   Shopping for groceries No Help Needed   Driving No Help Needed   Climbing a flight of stairs No Help Needed   Getting to places beyond walking distances No Help Needed        Coordination of Care Questionaire:   1. Have you been to the ER, urgent care clinic since your last visit? Hospitalized since your last visit? NO    2. Have you seen or consulted any other health care providers outside of the [address] [place] [address] since your last visit? Include any pap smears or colon screening. YES    Advanced Directive:   1. Do you have an Advanced Directive? YES    2. Would you like information on Advanced Directives?  YES

## 2021-11-26 ENCOUNTER — HOME HEALTH ADMISSION (OUTPATIENT)
Dept: HOME HEALTH SERVICES | Facility: HOME HEALTH | Age: 86
End: 2021-11-26
Payer: MEDICARE

## 2021-11-28 ENCOUNTER — HOME CARE VISIT (OUTPATIENT)
Dept: SCHEDULING | Facility: HOME HEALTH | Age: 86
End: 2021-11-28
Payer: MEDICARE

## 2021-11-28 PROCEDURE — 3331090001 HH PPS REVENUE CREDIT

## 2021-11-28 PROCEDURE — 400013 HH SOC

## 2021-11-28 PROCEDURE — 400018 HH-NO PAY CLAIM PROCEDURE

## 2021-11-28 PROCEDURE — 3331090002 HH PPS REVENUE DEBIT

## 2021-11-28 PROCEDURE — G0299 HHS/HOSPICE OF RN EA 15 MIN: HCPCS

## 2021-11-29 VITALS
TEMPERATURE: 98.4 F | HEART RATE: 94 BPM | SYSTOLIC BLOOD PRESSURE: 122 MMHG | OXYGEN SATURATION: 99 % | RESPIRATION RATE: 16 BRPM | DIASTOLIC BLOOD PRESSURE: 70 MMHG

## 2021-11-29 PROCEDURE — 3331090001 HH PPS REVENUE CREDIT

## 2021-11-29 PROCEDURE — 3331090002 HH PPS REVENUE DEBIT

## 2021-11-29 NOTE — CASE COMMUNICATION
SOC completed on Mar Fuss,  36. SN ordered. Diet / nutrition /medication education has begun. Will see 2wk3, and 2 prn. Thank you.

## 2021-11-29 NOTE — HOME HEALTH
Skilled services/Home bound verification: Patient Augusto Kraus understand homebound status. Skilled Reason for admission/summary of clinical condition: DM uncontrolled. .  This patient is homebound for the following reasons Requires considerable and taxing effort to leave the home , Requires the assistance of 1 or more persons to leave the home  and Only leaves the home for medical reasons or [employer] services and are infrequent and of short duration for other reasons . Caregiver: is girlfriend Frankie Patiño, she  Assists with ADLs, Medication management, Transportation to appointments, Meal prep and assists with ambulation. Medications reconciled and all medications are available in the home this visit. Medications  are somewhat effective at this time. High risk medication teaching regarding hyperglycemic agents performed, Patient is on Elemi insulin Went over the need to understand the signs of hypoglycemia and hyperglycemia, also to check sugar on a daily basis. Rotate injections to different areas of body, dispose of syringes in a non-porus container. He is also on Victoza, Jardiance and Actos, I We discussed the importance of taking hypoglycemics as ordered, knowing the signs of hypoglycemia and monitoring Blood sugars periodically. Home health supplies by type and quantity ordered/delivered this visit include: n/a    Patient education provided this visit to include: the importance of regular blood sugar monitoring for good blood sugar control. Patient instructed to follow with diabetic diet- monitoring sugar intake, limiting foods with high sugar content. I went over specifics with him about his likes and dislikes, patient has been drinking a lot of orange juice daily and has either white bread or white potatoes with every meal.  We discussed alternatives.     Patient is a fall risk, went over the need of having someone with him when ambulating, keep hallways and living areas free of clutter and throw rugs. Discussed the importance of staying well hydrated with water 6-8 glasses a day. Patient/caregiver degree of understanding:good    Home exercise program/Homework provided: Continue to decrease white empty carbs from diet. Change Orange juice for cranberry or other less sweet juices. Discussed s/s of infection to monitor for, s/s of UTI, who to report to/when. Instructed cg to notify staff/md/seek tx if complications occur. Patient instructed to maintain clear pathways in home and to minimize clutter to prevent falls from occurring/minimize fall potential.   Patient needing a well balanced diet with the 5 food groups, patient to increase fiber in diet, fresh fruits and vegetables, whole grains and increase water intake. Maintain healthy  ADA diet, and continue to monitor  Blood sugars, Observe for signs of infection. I went over the importance of taking all prescriptions as ordered. We discussed the signs of infection and when to call MD.  We discussed the high risk for falls and ways to prevent falls in the future. We discussed taking B/P daily and keeping a log. We also discussed the need of a ADA healthy diet, and the need to change positions frequently. Pt/Caregiver instructed on plan of care and are agreeable to plan of care at this time. Physician Fermin Wheeler  notified of patient admission to home health and plan of care including anticipated frequency of visits and treatments/interventions/modalities of SN. Discharge planning discussed with patient and caregiver. Discharge planning as follows: Will discharge when education is completed and patient is medically stable. Pt/Caregiver did verbalize understanding of discharge planning. Next MD appointment TBD  with Fermin Wheeler MD.  Patient/caregiver encouraged/instructed to keep appointment as lack of follow through with physician appointment could result in discontinuation of home care services for non-compliance.

## 2021-11-30 PROCEDURE — 3331090002 HH PPS REVENUE DEBIT

## 2021-11-30 PROCEDURE — 3331090001 HH PPS REVENUE CREDIT

## 2021-12-01 ENCOUNTER — HOME CARE VISIT (OUTPATIENT)
Dept: HOME HEALTH SERVICES | Facility: HOME HEALTH | Age: 86
End: 2021-12-01
Payer: MEDICARE

## 2021-12-01 PROCEDURE — 3331090002 HH PPS REVENUE DEBIT

## 2021-12-01 PROCEDURE — 3331090001 HH PPS REVENUE CREDIT

## 2021-12-01 NOTE — HOME HEALTH
spoke with pt he advised did not need a nursing visit today he was just seen. Nurse attempted to tell pt it was monday and he insisted it was yesterday and told me not to come.

## 2021-12-02 PROCEDURE — 3331090002 HH PPS REVENUE DEBIT

## 2021-12-02 PROCEDURE — 3331090001 HH PPS REVENUE CREDIT

## 2021-12-03 PROCEDURE — 3331090001 HH PPS REVENUE CREDIT

## 2021-12-03 PROCEDURE — 3331090002 HH PPS REVENUE DEBIT

## 2021-12-04 PROCEDURE — 3331090001 HH PPS REVENUE CREDIT

## 2021-12-04 PROCEDURE — 3331090002 HH PPS REVENUE DEBIT

## 2021-12-04 RX ORDER — MEGESTROL ACETATE 20 MG/1
TABLET ORAL
Qty: 30 TABLET | Refills: 5 | Status: SHIPPED | OUTPATIENT
Start: 2021-12-04 | End: 2022-03-18

## 2021-12-05 PROCEDURE — 3331090002 HH PPS REVENUE DEBIT

## 2021-12-05 PROCEDURE — 3331090001 HH PPS REVENUE CREDIT

## 2021-12-06 ENCOUNTER — HOME CARE VISIT (OUTPATIENT)
Dept: HOME HEALTH SERVICES | Facility: HOME HEALTH | Age: 86
End: 2021-12-06
Payer: MEDICARE

## 2021-12-06 ENCOUNTER — OFFICE VISIT (OUTPATIENT)
Dept: FAMILY MEDICINE CLINIC | Age: 86
End: 2021-12-06
Payer: MEDICARE

## 2021-12-06 ENCOUNTER — HOSPITAL ENCOUNTER (OUTPATIENT)
Dept: LAB | Age: 86
Discharge: HOME | End: 2021-12-06
Payer: MEDICARE

## 2021-12-06 VITALS
TEMPERATURE: 97.2 F | BODY MASS INDEX: 17.64 KG/M2 | HEIGHT: 66 IN | RESPIRATION RATE: 16 BRPM | WEIGHT: 109.8 LBS | HEART RATE: 96 BPM | OXYGEN SATURATION: 100 % | SYSTOLIC BLOOD PRESSURE: 98 MMHG | DIASTOLIC BLOOD PRESSURE: 68 MMHG

## 2021-12-06 DIAGNOSIS — E11.65 POORLY CONTROLLED TYPE 2 DIABETES MELLITUS (HCC): ICD-10-CM

## 2021-12-06 DIAGNOSIS — E11.65 POORLY CONTROLLED TYPE 2 DIABETES MELLITUS (HCC): Primary | ICD-10-CM

## 2021-12-06 DIAGNOSIS — R30.0 DYSURIA: ICD-10-CM

## 2021-12-06 DIAGNOSIS — L85.3 DRY SKIN: ICD-10-CM

## 2021-12-06 LAB
EST. AVERAGE GLUCOSE BLD GHB EST-MCNC: 303 MG/DL
HBA1C MFR BLD: 12.2 % (ref 4.2–5.6)

## 2021-12-06 PROCEDURE — G8418 CALC BMI BLW LOW PARAM F/U: HCPCS | Performed by: FAMILY MEDICINE

## 2021-12-06 PROCEDURE — G8510 SCR DEP NEG, NO PLAN REQD: HCPCS | Performed by: FAMILY MEDICINE

## 2021-12-06 PROCEDURE — 99214 OFFICE O/P EST MOD 30 MIN: CPT | Performed by: FAMILY MEDICINE

## 2021-12-06 PROCEDURE — G8427 DOCREV CUR MEDS BY ELIG CLIN: HCPCS | Performed by: FAMILY MEDICINE

## 2021-12-06 PROCEDURE — G8536 NO DOC ELDER MAL SCRN: HCPCS | Performed by: FAMILY MEDICINE

## 2021-12-06 PROCEDURE — 3331090001 HH PPS REVENUE CREDIT

## 2021-12-06 PROCEDURE — 83036 HEMOGLOBIN GLYCOSYLATED A1C: CPT

## 2021-12-06 PROCEDURE — 36415 COLL VENOUS BLD VENIPUNCTURE: CPT

## 2021-12-06 PROCEDURE — 3331090002 HH PPS REVENUE DEBIT

## 2021-12-06 PROCEDURE — 1101F PT FALLS ASSESS-DOCD LE1/YR: CPT | Performed by: FAMILY MEDICINE

## 2021-12-06 RX ORDER — TRIAMCINOLONE ACETONIDE 5 MG/G
CREAM TOPICAL 2 TIMES DAILY
Qty: 454 G | Refills: 0 | Status: SHIPPED | OUTPATIENT
Start: 2021-12-06 | End: 2021-12-06 | Stop reason: DRUGHIGH

## 2021-12-06 RX ORDER — AMOXICILLIN 875 MG/1
875 TABLET, FILM COATED ORAL 2 TIMES DAILY
Qty: 20 TABLET | Refills: 0 | Status: SHIPPED | OUTPATIENT
Start: 2021-12-06 | End: 2021-12-13 | Stop reason: ALTCHOICE

## 2021-12-06 RX ORDER — TRIAMCINOLONE ACETONIDE 1 MG/G
CREAM TOPICAL 2 TIMES DAILY
Qty: 454 G | Refills: 0 | Status: SHIPPED | OUTPATIENT
Start: 2021-12-06 | End: 2022-03-18

## 2021-12-06 NOTE — PROGRESS NOTES
Jose R Sr is a 81 y.o.  male and presents with    Chief Complaint   Patient presents with    Dysuria    Diabetes     Subjective:  Urinary Tract Infection  Patient complains of dysuria, frequency, hematuria. Onset was a few days ago, gradually worsening since that time. Patient complains of no other symptoms. Patient denies back pain, congestion, cough, fever, headache, rhinitis, sorethroat and stomach ache. Patient does not have a history of recurrent UTI. Patient does not have a history of pyelonephritis. Diabetes Mellitus:  He has diabetes mellitus, and  hyperlipidemia.  He has had referral to pharmacist for diabetes management. Diabetic ROS - medication compliance: noncompliant all of the time, diabetic diet compliance: noncompliant some of the time. He has new blood glucose monitor and needs assistance in performing the testing; blood glucose level > 200 two days ago. Lab review: labs reviewed, I note that glycosylated hemoglobin abnormal     ROS   Constitutional: Negative for fever; positive night sweats; no weight changes  HENT: Negative for sore throat.    Eyes: Negative for visual disturbance. Respiratory: No shortness of breath. Positive for cough and wheezing.    Cardiovascular: Negative for chest pain. Positive for dyspnea on exertion  Gastrointestinal: positive for abdominal pain which is localized to right inguinal area which is intermittent. He has intermittent loose stool  Endocrine: positive for polyuria. Musculoskeletal: Negative for gait problem. Skin: Negative for rash. Allergic/Immunologic: Negative for immunocompromised state. Neurological: Negative for syncope. Psychiatric/Behavioral: Negative for sleep disturbance  All other systems reviewed and are negative.     Objective:  Vitals:    12/06/21 0823 12/06/21 0825   BP: (!) 87/66 98/68   Pulse: 96    Resp: 16    Temp: 97.2 °F (36.2 °C)    TempSrc: Temporal    SpO2: 100%    Weight: 109 lb 12.8 oz (49.8 kg)    Height: 5' 6\" (1.676 m)    PainSc:   0 - No pain        alert, well appearing, and in no distress, oriented to person, place, and time and thin  Mental status - normal mood, behavior, speech, dress, motor activity, and thought processes  Chest - clear to auscultation, no wheezes, rales or rhonchi, symmetric air entry  Heart - normal rate, regular rhythm, normal S1, S2, no murmurs, rubs, clicks or gallops  Back - no CVA ttp    LABS   hgb a1c    TESTS      Assessment/Plan:    1. Dry skin  Start topical treatment  - triamcinolone (ARISTOCORT) 0.5 % topical cream; Apply  to affected area two (2) times a day. use thin layer  Dispense: 454 g; Refill: 0    2. Poorly controlled type 2 diabetes mellitus (HCC)  Goal hgb a1c <7; recently started victoza; assess for improvement  - HEMOGLOBIN A1C WITH EAG; Future    3. Dysuria  Start abx therapy  - amoxicillin (AMOXIL) 875 mg tablet; Take 1 Tablet by mouth two (2) times a day for 10 days. Dispense: 20 Tablet; Refill: 0      Lab review: orders written for new lab studies as appropriate; see orders      I have discussed the diagnosis with the patient and the intended plan as seen in the above orders. The patient has received an after-visit summary and questions were answered concerning future plans. I have discussed medication side effects and warnings with the patient as well. I have reviewed the plan of care with the patient, accepted their input and they are in agreement with the treatment goals.

## 2021-12-06 NOTE — PROGRESS NOTES
Joes R Sr is a 81 y.o. male (: 1935) presenting to address:    No chief complaint on file. There were no vitals filed for this visit. Hearing/Vision:   No exam data present    Learning Assessment:     Learning Assessment 2019   PRIMARY LEARNER Patient   PRIMARY LANGUAGE ENGLISH   LEARNER PREFERENCE PRIMARY LISTENING   ANSWERED BY patient   RELATIONSHIP SELF     Depression Screening:     3 most recent PHQ Screens 2021   Little interest or pleasure in doing things Not at all   Feeling down, depressed, irritable, or hopeless Not at all   Total Score PHQ 2 0     Fall Risk Assessment:     Fall Risk Assessment, last 12 mths 2021   Able to walk? Yes   Fall in past 12 months? 0   Do you feel unsteady? 0   Are you worried about falling 0   Is TUG test greater than 12 seconds? -   Is the gait abnormal? -   Number of falls in past 12 months -   Fall with injury? -     Abuse Screening:     Abuse Screening Questionnaire 2021   Do you ever feel afraid of your partner? N   Are you in a relationship with someone who physically or mentally threatens you? N   Is it safe for you to go home? Y     ADL Assessment:     ADL Assessment 2019   Feeding yourself No Help Needed   Getting from bed to chair No Help Needed   Getting dressed No Help Needed   Bathing or showering No Help Needed   Walk across the room (includes cane/walker) No Help Needed   Using the telphone No Help Needed   Taking your medications No Help Needed   Preparing meals No Help Needed   Managing money (expenses/bills) No Help Needed   Moderately strenuous housework (laundry) No Help Needed   Shopping for personal items (toiletries/medicines) No Help Needed   Shopping for groceries No Help Needed   Driving No Help Needed   Climbing a flight of stairs No Help Needed   Getting to places beyond walking distances No Help Needed        Coordination of Care Questionaire:   1.  Have you been to the ER, urgent care clinic since your last visit? Hospitalized since your last visit? NO    2. Have you seen or consulted any other health care providers outside of the [address] [place] [address] since your last visit? Include any pap smears or colon screening. NO    Advanced Directive:   1. Do you have an Advanced Directive? YES    2. Would you like information on Advanced Directives?  NO

## 2021-12-06 NOTE — HOME HEALTH
Called pt and attempted to set up a nursing visit and he refused and said maybe other day but not today.

## 2021-12-07 ENCOUNTER — TELEPHONE (OUTPATIENT)
Dept: FAMILY MEDICINE CLINIC | Age: 86
End: 2021-12-07

## 2021-12-07 ENCOUNTER — HOME CARE VISIT (OUTPATIENT)
Dept: HOME HEALTH SERVICES | Facility: HOME HEALTH | Age: 86
End: 2021-12-07
Payer: MEDICARE

## 2021-12-07 PROCEDURE — 3331090001 HH PPS REVENUE CREDIT

## 2021-12-07 PROCEDURE — 3331090002 HH PPS REVENUE DEBIT

## 2021-12-07 NOTE — TELEPHONE ENCOUNTER
Pharmacy Progress Note - Telephone Call    Mr. Gonzalo Lim 81 y.o. was contacted via an outbound telephone call for diabetes follow-up call today. A voicemail was left for patient to return my call. Patient was recently prescribed Victoza. Called patient to make sure Victoza was started without issue. Also want to make sure patient advances dose from 0.6 mg to 1.2 mg after 1 week, as the 0.6 mg is does not provide effective glycemic control and is only intended to reduce incidence of GI symptoms.     Thank you,  Rudi Boyle, PharmD  Clinical Pharmacist  [phone]

## 2021-12-08 PROCEDURE — 3331090001 HH PPS REVENUE CREDIT

## 2021-12-08 PROCEDURE — 3331090002 HH PPS REVENUE DEBIT

## 2021-12-09 PROCEDURE — 3331090001 HH PPS REVENUE CREDIT

## 2021-12-09 PROCEDURE — 3331090002 HH PPS REVENUE DEBIT

## 2021-12-10 PROCEDURE — 3331090001 HH PPS REVENUE CREDIT

## 2021-12-10 PROCEDURE — 3331090002 HH PPS REVENUE DEBIT

## 2021-12-11 ENCOUNTER — HOME CARE VISIT (OUTPATIENT)
Dept: SCHEDULING | Facility: HOME HEALTH | Age: 86
End: 2021-12-11
Payer: MEDICARE

## 2021-12-11 PROCEDURE — 3331090002 HH PPS REVENUE DEBIT

## 2021-12-11 PROCEDURE — G0299 HHS/HOSPICE OF RN EA 15 MIN: HCPCS

## 2021-12-11 PROCEDURE — 3331090001 HH PPS REVENUE CREDIT

## 2021-12-12 PROCEDURE — 3331090001 HH PPS REVENUE CREDIT

## 2021-12-12 PROCEDURE — 3331090002 HH PPS REVENUE DEBIT

## 2021-12-13 VITALS
DIASTOLIC BLOOD PRESSURE: 70 MMHG | TEMPERATURE: 97.8 F | HEART RATE: 72 BPM | OXYGEN SATURATION: 98 % | RESPIRATION RATE: 16 BRPM | SYSTOLIC BLOOD PRESSURE: 132 MMHG

## 2021-12-13 PROCEDURE — 3331090001 HH PPS REVENUE CREDIT

## 2021-12-13 PROCEDURE — 3331090002 HH PPS REVENUE DEBIT

## 2021-12-13 NOTE — HOME HEALTH
Caregiver involvement:  Assists with ADLs, Medication management, Transportation to appointments, Meal prep and assists with ambulation. Medications reconciled and all medications are available in the home this visit. Medications  are effective at this time. Home health supplies by type and quantity ordered/delivered this visit include: n/a    Patient education provided this visit:Patient has made some major changes to his diet, \" don't drink Orange Juice all the time like I used to\"  Blood sugars are running in the 100-130 range according to patient. pt instructed to follow with diabetic diet- monitoring sugar intake, limiting foods with high sugar content. Patient is a fall risk, went over the need of having someone with him when ambulating, keep hallways and living areas free of clutter and throw rugs. We discussed other changes, modifications in his diet to continue to get control of diabetes, Patient is now checking BS daily. Progress toward goals: Blood sugars much better. Home exercise program: the importance of regular blood sugar monitoring for good blood sugar control. Discussed s/s of infection to monitor for, s/s of UTI, who to report to/when. Instructed cg to notify staff/md/seek tx if complications occur. Patient instructed to maintain clear pathways in home and to minimize clutter to prevent falls from occurring/minimize fall potential.   Patient needing a well balanced diet with the 5 food groups, patient to increase fiber in diet, fresh fruits and vegetables, whole grains and increase water intake. Maintain healthy ADA diet, Continue to monitor Blood sugars, Observe for signs of infection.  :I went over the importance of taking all prescriptions as ordered. I discussed how to avoid extra sodium in his diet. We discussed the signs of infection and when to call MD.  We discussed the high risk for falls and ways to prevent falls in the future.  We discussed taking B/P daily and keeping a log. We also discussed the need of a heart healthy diet, and the need to change positions frequently. Continued need for the following skills: Nursing    The following discharge planning was discussed with the pt/caregiver: Will discharge from nursing when education is complete and patient is medically stable.

## 2021-12-14 PROCEDURE — 3331090001 HH PPS REVENUE CREDIT

## 2021-12-14 PROCEDURE — 3331090002 HH PPS REVENUE DEBIT

## 2021-12-15 ENCOUNTER — HOME CARE VISIT (OUTPATIENT)
Dept: HOME HEALTH SERVICES | Facility: HOME HEALTH | Age: 86
End: 2021-12-15
Payer: MEDICARE

## 2021-12-15 PROCEDURE — 3331090001 HH PPS REVENUE CREDIT

## 2021-12-15 PROCEDURE — 3331090002 HH PPS REVENUE DEBIT

## 2021-12-15 NOTE — Clinical Note
Called pt last night to set time and he refused my visit today stated he ws seen on sunday and doesnt need a nusing visit, Scheduling notified and missed visit note placed    Thanks in [address] [place]

## 2021-12-15 NOTE — HOME HEALTH
SPoke with pt last night and he refused nursing isit today staing he was seen on sunday and we are coming to much.

## 2021-12-16 PROCEDURE — 3331090001 HH PPS REVENUE CREDIT

## 2021-12-16 PROCEDURE — 3331090002 HH PPS REVENUE DEBIT

## 2021-12-17 ENCOUNTER — HOME CARE VISIT (OUTPATIENT)
Dept: HOME HEALTH SERVICES | Facility: HOME HEALTH | Age: 86
End: 2021-12-17
Payer: MEDICARE

## 2021-12-17 PROCEDURE — 3331090002 HH PPS REVENUE DEBIT

## 2021-12-17 PROCEDURE — 3331090001 HH PPS REVENUE CREDIT

## 2021-12-17 NOTE — Clinical Note
Roxana said discharge     ----- Message -----  From: Mirna Vaughn RN  Sent: 12/18/2021  12:00 AM EST  To: Ruth Barr  Subject: RE:                                                ok keep me posted       ----- Message -----  From: Alison Anderson  Sent: 12/17/2021   7:28 AM EST  To: Yuridia Dolan RN      This pt has had several missed visits refuses my visit everytime I call. We may need to just discharge from last visit. Patti Elias has been notified.      Thanks in advance  Ruth Barr LPN

## 2021-12-17 NOTE — Clinical Note
This pt has had several missed visits refuses my visit everytime I call. We may need to just discharge from last visit. Gianni Hyatt has been notified.      Thanks in advance  Kai Vieira LPN

## 2021-12-17 NOTE — HOME HEALTH
Called pt last night again to schedule for today and he refused care advised we ome to much and he doesnt need us all the time. I atempted to educate the reason for my vist and the pt hung up on me.  Scheduling notified as well as

## 2021-12-17 NOTE — Clinical Note
ok keep me posted       ----- Message -----  From: Laura Higgins  Sent: 12/17/2021   7:28 AM EST  To: Parish Batista RN      This pt has had several missed visits refuses my visit everytime I call. We may need to just discharge from last visit. Jas Cardoza has been notified.      Thanks in advance  Kaity Fierro LPN

## 2021-12-18 PROCEDURE — 3331090002 HH PPS REVENUE DEBIT

## 2021-12-18 PROCEDURE — 3331090001 HH PPS REVENUE CREDIT

## 2021-12-19 PROCEDURE — 3331090001 HH PPS REVENUE CREDIT

## 2021-12-19 PROCEDURE — 3331090002 HH PPS REVENUE DEBIT

## 2021-12-20 PROCEDURE — 3331090001 HH PPS REVENUE CREDIT

## 2021-12-20 PROCEDURE — 3331090002 HH PPS REVENUE DEBIT

## 2021-12-21 PROCEDURE — 3331090001 HH PPS REVENUE CREDIT

## 2021-12-21 PROCEDURE — 3331090002 HH PPS REVENUE DEBIT

## 2021-12-22 ENCOUNTER — HOME CARE VISIT (OUTPATIENT)
Dept: SCHEDULING | Facility: HOME HEALTH | Age: 86
End: 2021-12-22
Payer: MEDICARE

## 2021-12-22 VITALS
HEART RATE: 91 BPM | DIASTOLIC BLOOD PRESSURE: 62 MMHG | TEMPERATURE: 98.6 F | SYSTOLIC BLOOD PRESSURE: 115 MMHG | RESPIRATION RATE: 16 BRPM | OXYGEN SATURATION: 95 %

## 2021-12-22 PROCEDURE — 3331090001 HH PPS REVENUE CREDIT

## 2021-12-22 PROCEDURE — 3331090002 HH PPS REVENUE DEBIT

## 2021-12-22 PROCEDURE — G0299 HHS/HOSPICE OF RN EA 15 MIN: HCPCS

## 2021-12-22 PROCEDURE — 3331090003 HH PPS REVENUE ADJ

## 2021-12-23 NOTE — HOME HEALTH
Skilled reason for visit: [facility] DC VISIT, BS, MEDS, PAIN, F/U PCP [facility] 3/7 AND NEXT MD APPT 1/5    Caregiver involvement: Primary caregiver is available most days and some evenings, it is his girlfriend, Curtis Valencia, she Assists with ADLs, Medication management, Transportation to appointments, Meal prep and assists with ambulation. Medications reviewed and all medications are available in the home this visit. The following education was provided regarding medications, medication interactions, and look alike medications (specify):   ARICEPT, LEVEMIR, Jardiance AND PROSCAR  Medications  are effective at this time. Home health supplies by type and quantity ordered/delivered this visit include: N/A    Patient education provided this visit: pt instructed to follow with diabetic diet- monitoring sugar intake, limiting foods with high sugar content. discussed diabetic diet, s/s of hypoglycemia, hyperglycemia- who to report to/when, DM management with glucometer and BS checks, insulin management, preparation and injection administration. INSTRUCTED PT TO MINIMIZE RISK OF DIABETIC NEUROPATHY BY: KEEPING BS UNDER CONTROL, WEAR WELL FITTING SHOES, INSPECT FEET DAILY, IF ANY REDNESS OR CUTS, SEE MD IMMEDIATELY, VISIT FOOT SURGEON OR PODIATRIST REGULARLY AND ENDOCRINOLOGISTS. Instruct patient/caregiver in methods to conserve energy. TAKE FREQUENT BREAKS, USE ASSISTIVE DEVICE. Patient is a fall risk. Educated pateint to sit on the side of the chair/bed, take a slow deep breaths, have feet firmly planted before standing up, use cane/walker/wheelchair if available, or have someone to assist. patient aware to monitor for effectiveness and to notify staff of any adverse reactions to medications/any changes to medication regimen. reviewed side effects, purposes, dosage, frequencies.  patient encouraged to monitor for increase in pain and to continue with current pain management and to notify staff/md if pain becomes excrutiating/intolerable. THE PATIENT AND CG WERE RECOMMENDED TO CALL PCP OR TAKE PT TO THE ER WITH ANY FEVER 101 OR ABOVE, CHILLS, SOB, CHEST PAIN, SEVERE HEADACHE, PROFUSE VOMITING AND/OR DIARRHEA, ACUTE PAIN OR ANY OTHER ACUTE CHANGE    Progress toward goals: PT REFUSED 4 NURSING VISITS. pt stated to nurse that we come to see him too much, and he doesnt need nursing. He stated that nursing gets in the way of his schedule and when he needs to leave the apt. Home exercise program: activity as tolerated, trying to get physical activity 4-5 x weekly. stopping activity if causing shortness of breath or chest pain, dizziness or weakness. Continued need for the following skills: none     Patient and/or caregiver notified and agrees to changes in the Plan of Care YES      The following discharge planning was discussed with the pt/caregiver: pt will be discharged today due to refusing too many nursing visits and pt also stating he doesnt need nursing to see him.

## 2022-01-18 ENCOUNTER — OFFICE VISIT (OUTPATIENT)
Dept: FAMILY MEDICINE CLINIC | Age: 87
End: 2022-01-18
Payer: MEDICARE

## 2022-01-18 ENCOUNTER — HOSPITAL ENCOUNTER (OUTPATIENT)
Dept: LAB | Age: 87
Discharge: HOME | End: 2022-01-18
Payer: MEDICARE

## 2022-01-18 VITALS
BODY MASS INDEX: 17.36 KG/M2 | DIASTOLIC BLOOD PRESSURE: 70 MMHG | HEIGHT: 66 IN | OXYGEN SATURATION: 94 % | TEMPERATURE: 97.4 F | HEART RATE: 64 BPM | SYSTOLIC BLOOD PRESSURE: 105 MMHG | WEIGHT: 108 LBS | RESPIRATION RATE: 16 BRPM

## 2022-01-18 DIAGNOSIS — E11.65 POORLY CONTROLLED TYPE 2 DIABETES MELLITUS (HCC): ICD-10-CM

## 2022-01-18 DIAGNOSIS — R63.4 WEIGHT LOSS: ICD-10-CM

## 2022-01-18 DIAGNOSIS — F10.10 ALCOHOL ABUSE: Primary | ICD-10-CM

## 2022-01-18 DIAGNOSIS — R97.20 ELEVATED PSA: ICD-10-CM

## 2022-01-18 DIAGNOSIS — F33.9 RECURRENT DEPRESSION (HCC): ICD-10-CM

## 2022-01-18 DIAGNOSIS — E78.00 HYPERCHOLESTEROLEMIA: ICD-10-CM

## 2022-01-18 DIAGNOSIS — F10.10 ALCOHOL ABUSE: ICD-10-CM

## 2022-01-18 DIAGNOSIS — E11.65 POORLY CONTROLLED DIABETES MELLITUS (HCC): ICD-10-CM

## 2022-01-18 LAB
ALBUMIN SERPL-MCNC: 3.2 G/DL (ref 3.4–5)
ALBUMIN/GLOB SERPL: 0.8 {RATIO} (ref 0.8–1.7)
ALP SERPL-CCNC: 116 U/L (ref 45–117)
ALT SERPL-CCNC: 21 U/L (ref 16–61)
AST SERPL-CCNC: 12 U/L (ref 10–38)
BILIRUB DIRECT SERPL-MCNC: 0.1 MG/DL (ref 0–0.2)
BILIRUB SERPL-MCNC: 0.4 MG/DL (ref 0.2–1)
CHOLEST SERPL-MCNC: 166 MG/DL
CREAT UR-MCNC: 46 MG/DL (ref 30–125)
EST. AVERAGE GLUCOSE BLD GHB EST-MCNC: 298 MG/DL
GLOBULIN SER CALC-MCNC: 3.8 G/DL (ref 2–4)
HBA1C MFR BLD: 12 % (ref 4.2–5.6)
HDLC SERPL-MCNC: 46 MG/DL (ref 40–60)
HDLC SERPL: 3.6 {RATIO} (ref 0–5)
LDLC SERPL CALC-MCNC: 91.6 MG/DL (ref 0–100)
LIPID PROFILE,FLP: NORMAL
MICROALBUMIN UR-MCNC: 82.8 MG/DL (ref 0–3)
MICROALBUMIN/CREAT UR-RTO: 1800 MG/G (ref 0–30)
PROT SERPL-MCNC: 7 G/DL (ref 6.4–8.2)
TRIGL SERPL-MCNC: 142 MG/DL (ref ?–150)
VLDLC SERPL CALC-MCNC: 28.4 MG/DL

## 2022-01-18 PROCEDURE — G8536 NO DOC ELDER MAL SCRN: HCPCS | Performed by: FAMILY MEDICINE

## 2022-01-18 PROCEDURE — 80061 LIPID PANEL: CPT

## 2022-01-18 PROCEDURE — 36415 COLL VENOUS BLD VENIPUNCTURE: CPT

## 2022-01-18 PROCEDURE — 83036 HEMOGLOBIN GLYCOSYLATED A1C: CPT

## 2022-01-18 PROCEDURE — 80076 HEPATIC FUNCTION PANEL: CPT

## 2022-01-18 PROCEDURE — 1101F PT FALLS ASSESS-DOCD LE1/YR: CPT | Performed by: FAMILY MEDICINE

## 2022-01-18 PROCEDURE — G8510 SCR DEP NEG, NO PLAN REQD: HCPCS | Performed by: FAMILY MEDICINE

## 2022-01-18 PROCEDURE — 99214 OFFICE O/P EST MOD 30 MIN: CPT | Performed by: FAMILY MEDICINE

## 2022-01-18 PROCEDURE — G8427 DOCREV CUR MEDS BY ELIG CLIN: HCPCS | Performed by: FAMILY MEDICINE

## 2022-01-18 PROCEDURE — 82043 UR ALBUMIN QUANTITATIVE: CPT

## 2022-01-18 PROCEDURE — G8418 CALC BMI BLW LOW PARAM F/U: HCPCS | Performed by: FAMILY MEDICINE

## 2022-01-18 NOTE — PROGRESS NOTES
Yaw Jiménez presents today for   Chief Complaint   Patient presents with    Diabetes    Weight Loss       Is someone accompanying this pt? no    Is the patient using any DME equipment during OV? no    Depression Screening:  3 most recent PHQ Screens 1/18/2022   Little interest or pleasure in doing things Not at all   Feeling down, depressed, irritable, or hopeless Not at all   Total Score PHQ 2 0       Learning Assessment:  Learning Assessment 6/28/2019   PRIMARY LEARNER Patient   PRIMARY LANGUAGE ENGLISH   LEARNER PREFERENCE PRIMARY LISTENING   ANSWERED BY patient   RELATIONSHIP SELF       Abuse Screening:  Abuse Screening Questionnaire 5/25/2021   Do you ever feel afraid of your partner? N   Are you in a relationship with someone who physically or mentally threatens you? N   Is it safe for you to go home? Y       Fall Risk  Fall Risk Assessment, last 12 mths 1/18/2022   Able to walk? Yes   Fall in past 12 months? 0   Do you feel unsteady? 0   Are you worried about falling 0   Is TUG test greater than 12 seconds? -   Is the gait abnormal? -   Number of falls in past 12 months -   Fall with injury? -       Health Maintenance reviewed and discussed and ordered per Provider. Health Maintenance Due   Topic Date Due    Eye Exam Retinal or Dilated  Never done    DTaP/Tdap/Td series (1 - Tdap) Never done    Shingrix Vaccine Age 50> (1 of 2) Never done    Pneumococcal 65+ years (1 of 1 - PPSV23) Never done    COVID-19 Vaccine (3 - Booster for Pfizer series) 12/11/2021    MICROALBUMIN Q1  01/07/2022    Lipid Screen  01/07/2022   . Coordination of Care:  1. Have you been to the ER, urgent care clinic since your last visit? Hospitalized since your last visit? no    2. Have you seen or consulted any other health care providers outside of the [address] [place] [address] since your last visit? Include any pap smears or colon screening.  no      Last  Checked na  Last UDS Checked na  Last Pain contract signed: na    Patient presents in office today for routine care.   Patient concerns: requesting oxygen

## 2022-01-18 NOTE — PROGRESS NOTES
Guillaume Sanchez is a 81 y.o.  male and presents with    Chief Complaint   Patient presents with    Diabetes    Weight Loss     Subjective:  He returns c/o weight loss; he has appetite. He has elevated psa and had appointment with urology today but this was postponed. He he denies hot or cold intolerance. Diabetes Mellitus:  He has diabetes mellitus, and  hyperlipidemia.  He has had referral to pharmacist for diabetes management. Diabetic ROS - medication compliance: noncompliant all of the time, diabetic diet compliance: noncompliant some of the time.  He has new blood glucose monitor and needs assistance in performing the testing; blood glucose level > 200 two days ago. Lab review: labs reviewed, I note that glycosylated hemoglobin abnormal     ROS   Constitutional: Negative for fever; positive night sweats; no weight changes  HENT: Negative for sore throat.    Eyes: Negative for visual disturbance. Respiratory: No shortness of breath. Positive for cough and wheezing.    Cardiovascular: Negative for chest pain. Positive for dyspnea on exertion  Gastrointestinal: positive for abdominal pain which is localized to right inguinal area which is intermittent. He has intermittent loose stool  Endocrine: positive for polyuria. Musculoskeletal: Negative for gait problem. Skin: Negative for rash. Allergic/Immunologic: Negative for immunocompromised state. Neurological: Negative for syncope. Psychiatric/Behavioral: Negative for sleep disturbance    All other systems reviewed and are negative.       Objective:  Vitals:    01/18/22 0832   BP: 105/70   Pulse: 64   Resp: 16   Temp: 97.4 °F (36.3 °C)   TempSrc: Temporal   SpO2: 94%   Weight: 108 lb (49 kg)   Height: 5' 6\" (1.676 m)   PainSc:   0 - No pain       alert, well appearing, and in no distress, oriented to person, place, and time and thin  Mental status - normal mood, behavior, speech, dress, motor activity, and thought processes  Neurological - cranial nerves II through XII intact    LABS   PSA 14  hgb a1c 12  TESTS      Assessment/Plan:    1. Recurrent depression ([facility] [address]  Mood exacerbated by weight loss    2. Poorly controlled type 2 diabetes mellitus (HCC)  Goal hgb a1c <7; adjust victoza with increased dose to 1.2 mg daily  - HEMOGLOBIN A1C WITH EAG; Future    3. Alcohol abuse  Heavy alcohol use; assess liver function  - HEPATIC FUNCTION PANEL; Future    4. Weight loss  Concern for alcohol malnutrition vs prostate cancer with elevated psa    5. Hypercholesterolemia  continue statin therapy; assess efficacy    6. Elevated PSA  F/u with urology; continue medications for hyperplasia    7. Poorly controlled diabetes mellitus ([facility] [address]    - LIPID PANEL; Future  - MICROALBUMIN, UR, RAND W/ MICROALB/CREAT RATIO; Future  - liraglutide (VICTOZA) 0.6 mg/0.1 mL (18 mg/3 mL) pnij; 1.2 mg by SubCUTAneous route daily. Dispense: 2 Each; Refill: 5      Lab review: orders written for new lab studies as appropriate; see orders      I have discussed the diagnosis with the patient and the intended plan as seen in the above orders. The patient has received an after-visit summary and questions were answered concerning future plans. I have discussed medication side effects and warnings with the patient as well. I have reviewed the plan of care with the patient, accepted their input and they are in agreement with the treatment goals.

## 2022-01-28 PROBLEM — R55 SYNCOPE AND COLLAPSE: Status: ACTIVE | Noted: 2022-01-28

## 2022-01-28 PROBLEM — I95.9 HYPOTENSION: Status: ACTIVE | Noted: 2022-01-28

## 2022-01-28 PROBLEM — R11.2 NAUSEA WITH VOMITING: Status: ACTIVE | Noted: 2022-01-28

## 2022-01-28 PROBLEM — E86.0 DEHYDRATION: Status: ACTIVE | Noted: 2022-01-28

## 2022-02-01 ENCOUNTER — PATIENT OUTREACH (OUTPATIENT)
Dept: CASE MANAGEMENT | Age: 87
End: 2022-02-01

## 2022-02-01 NOTE — PROGRESS NOTES
Transitions of Care Call  Call within 2 business days of discharge: Yes     Patient: River Rocha Patient : 1935 MRN: [id number]    Last Discharge [address]       Complaint Diagnosis Description Type Department Provider    22 Hypotension; Syncope Syncope and collapse . .. ED to Hosp-Admission (Discharged) (ADMIT) Holly Drummond MD; Stefan Gutierrez. .. Was this an external facility discharge? No Discharge Facility: N/A    Challenges to be reviewed by the provider   Additional needs identified to be addressed with provider no  none            No answer. Left HIPPA compliant message. Name, role and contact information provided. Requested return call. Will attempt to contact at a later time.             Future Appointments   Date Time Provider Reina Duncan   2/10/2022 11:15 AM Pennie Pires MD [address] [place] [address]   2022  2:00 PM Bernice Pedroza MD [address] [place] [address]   3/7/2022  8:45 AM Palma Schmidt MD [facility] BS AMB        Emre Aguilar RN

## 2022-02-02 ENCOUNTER — PATIENT OUTREACH (OUTPATIENT)
Dept: CASE MANAGEMENT | Age: 87
End: 2022-02-02

## 2022-02-02 NOTE — PROGRESS NOTES
Transitions of Care Call  Call within 2 business days of discharge: Yes     Patient: Dahlia Kim Patient : 1935 MRN: [id number]    Last Discharge [address]       Complaint Diagnosis Description Type Department Provider    22 Hypotension; Syncope Syncope and collapse . .. ED to Hosp-Admission (Discharged) (ADMIT) Maribel Freeman MD; Giles Klinefelter. .. Was this an external facility discharge? No Discharge Facility: N/A    Challenges to be reviewed by the provider   Additional needs identified to be addressed with provider no  none           Second attempt. No answer. Left HIPPA compliant message. Name, role and contact information provided. Requested return call. Will attempt to contact at a later time.          Future Appointments   Date Time Provider Riena Duncan   2/10/2022 11:15 AM Mayuri Paredes MD [facility]   2/15/2022  7:45 AM Susan Mcallister MD [facility] BS AMB   2022  2:00 PM Sina Parr MD [address]   3/7/2022  8:45 AM Marita Schmidt MD [facility] BS AMB        Ghulam Robin, RN

## 2022-02-09 ENCOUNTER — PATIENT OUTREACH (OUTPATIENT)
Dept: CASE MANAGEMENT | Age: 87
End: 2022-02-09

## 2022-02-09 NOTE — PROGRESS NOTES
Transitions of Care Call  Call within 2 business days of discharge: Yes     Patient: Frances Thao Patient : 1935 MRN: [id number]    Last Discharge [address]       Complaint Diagnosis Description Type Department Provider    22 Hypotension; Syncope Syncope and collapse . .. ED to Hosp-Admission (Discharged) (ADMIT) Steven Roy MD; Oswaldo Elliott. .. Was this an external facility discharge? No Discharge Facility: N/A    Challenges to be reviewed by the provider   Additional needs identified to be addressed with provider: yes   Patient reported intermittent burning with urination. Denied hematuria, N/V/D and lower abdominal pain. Encounter was routed to provider for escalation. Method of communication with provider: chart routing. Discussed COVID-19 related testing which was available at this time. Test results were negative. Patient informed of results, if available? yes. Current Symptoms:no new symptoms and no worsening symptoms    Reviewed New or Changed Meds: yes; patient verbalized he completed Keflex    Do you have what you need at home?  Durable Medical Equipment ordered at discharge: None   Home Health/Outpatient orders at discharge: none    Pulse oximeter? no     Patient education provided: Reviewed appropriate site of care based on symptoms and resources available to patient including: Specialist and CTN.      Follow up appointment scheduled within 7 days of discharge: no. If no appointment scheduled, scheduling offered: no.  Future Appointments   Date Time Provider Reina Duncan   2/10/2022 11:15 AM MD Mayra Dewitt   2022  2:00 PM MD Mayra Gonzales   3/7/2022  8:45 AM Lizette Schmidt MD [facility] BS AMB       Interventions: Obtained and reviewed discharge summary and/or continuity of care documents  CTN reviewed discharge instructions, medical action plan and red flags with patient who verbalized understanding. Provided contact information for future needs. Plan for follow-up call in 7-10 days based on severity of symptoms and risk factors.   Plan for next call: symptom management-burning with urination and follow up appointment-verify patient attended urology appt on 2/10     Josias Champagne RN

## 2022-02-10 PROBLEM — E55.9 VITAMIN D DEFICIENCY: Status: ACTIVE | Noted: 2022-02-10

## 2022-02-14 ENCOUNTER — NURSE TRIAGE (OUTPATIENT)
Dept: OTHER | Facility: CLINIC | Age: 87
End: 2022-02-14

## 2022-02-14 NOTE — TELEPHONE ENCOUNTER
Received call from Vida Deluca at [facility] with Red Flag Complaint. Subjective: Caller states \"he wakes up very dizzy, and the room is spinning or tilting and he is not able to get to the bathroom without falling,  He has chills. \"     Current Symptoms: dizziness, chills, cold extremities, bladder fulness, not able to void. Onset: 2 weeks ago; gradual, worsening    Associated Symptoms: reduced activity, reduced appetite, increased wakefulness, increased urination    Pain Severity: 9/10; pressure; constant, severe    Temperature: not assessed n/a    What has been tried: fluids    LMP: NA Pregnant: NA    Recommended disposition: Go to ED now or PCP with approval.     2nd level triage completed with Denisse Rodríguez Nurse; provider recommends patient be seen Go to ED now. Care advice provided, patient verbalizes understanding; denies any other questions or concerns; instructed to call back for any new or worsening symptoms. Patient proceeding to [facility] Emergency Department    Attention Provider: Thank you for allowing me to participate in the care of your patient. The patient was connected to triage in response to information provided to the [facility]. Please do not respond through this encounter as the response is not directed to a shared pool.         Reason for Disposition   Unable to urinate (or only a few drops) and bladder feels very full    Protocols used: URINATION PAIN - MALE-ADULT-[place]

## 2022-02-17 ENCOUNTER — PATIENT OUTREACH (OUTPATIENT)
Dept: CASE MANAGEMENT | Age: 87
End: 2022-02-17

## 2022-02-17 NOTE — PROGRESS NOTES
Follow Up Call    Challenges to be reviewed by the provider   Additional needs identified to be addressed with provider: no  none           Encounter was not routed to provider for escalation. Method of communication with provider: none. Contacted the patient by telephone to follow up after hospital visit. Status: doing ok  Interventions to address identified needs: Assessment and support for treatment adherence and medication management-taking medications as ordered    [facility] follow up appointment(s):   Future Appointments   Date Time Provider Reina Duncan   2/25/2022  2:00 PM Yessica Kendall MD [address]   3/2/2022 11:00 AM [facility] [address] [place]   3/7/2022  8:45 AM Shandra Duran MD [facility]   4/6/2022 11:00 AM Angie Thomas MD [place] [address] [address] [facility] follow up appointment(s): see above   Follow up appointment completed? yes. Provided contact information for future needs. Plan for follow-up call in 7-10 days based on severity of symptoms and risk factors.   Plan for next call: follow up     Irene Gutierrez LPN

## 2022-02-24 ENCOUNTER — PATIENT OUTREACH (OUTPATIENT)
Dept: CASE MANAGEMENT | Age: 87
End: 2022-02-24

## 2022-02-24 NOTE — PROGRESS NOTES
Follow Up Call    Challenges to be reviewed by the provider   Additional needs identified to be addressed with provider: no  none           Encounter was not routed to provider for escalation. Method of communication with provider: none. Contacted the patient by telephone to follow up after hospital visit. Status: improved  Interventions to address identified needs: Obtained and reviewed discharge summary and/or continuity of care documents  Reminded patient of follow up appointment on 2/25/22 with Dr. Gabrielle Paulson. Patient unaware asked for me to speak with his wife. Appointment information given to spouse. Wife will confirm with urology office. [facility] follow up appointment(s):   Future Appointments   Date Time Provider Reina Duncan   2/25/2022  2:00 PM Chris Manzanares MD [address] [facility]   3/2/2022 11:00 AM [place] [id number] [place] [address] [place]   3/7/2022  8:45 AM Cortes Mooney MD [facility] BS AMB   4/6/2022 11:00 AM Juan Roe MD [facility] [address] [facility]     Non[facility] follow up appointment(s): see above   Follow up appointment completed? yes. Provided contact information for future needs. Plan for follow-up call in 5-7 days based on severity of symptoms and risk factors.   Plan for next call: follow up     Carmela Wilkerson LPN

## 2022-02-25 PROBLEM — R73.9 HYPERGLYCEMIA: Status: ACTIVE | Noted: 2022-02-25

## 2022-02-25 PROBLEM — R10.9 ABDOMINAL PAIN: Status: ACTIVE | Noted: 2022-02-25

## 2022-02-25 PROBLEM — N17.9 AKI (ACUTE KIDNEY INJURY) (HCC): Status: ACTIVE | Noted: 2022-02-25

## 2022-02-25 PROBLEM — N17.9 ACUTE KIDNEY INJURY (HCC): Status: ACTIVE | Noted: 2022-02-25

## 2022-03-01 ENCOUNTER — PATIENT OUTREACH (OUTPATIENT)
Dept: CASE MANAGEMENT | Age: 87
End: 2022-03-01

## 2022-03-01 NOTE — PROGRESS NOTES
Care Transitions Initial Call    Call within 2 business days of discharge: Yes     Patient: Roxy Riding Patient : 1935 MRN: [id number]    Last Discharge [address]       Complaint Diagnosis Description Type Department Provider    22 Abdominal Pain Hyperglycemia . .. ED to Hosp-Admission (Discharged) (ADMIT) Anita Suazo MD; Jakub Ortiz . .. Was this an external facility discharge? Yes, 2022 to 2022 Discharge Facility: [facility] to be reviewed by the provider   Additional needs identified to be addressed with provider: no  none         Method of communication with provider : none    Discussed COVID-19 related testing which was not done at this time. Test results were not done. Patient informed of results, if available? n/a     Advance Care Planning:   Does patient have an Advance Directive:  health care decision makers updated    Inpatient Readmission Risk score: Unplanned Readmit Risk Score: 15.9 ( )    Was this a readmission? yes   Patient stated reason for the admission: abdominal pain. Previous admission 2022 to 2022 for syncope and collapse    Patients top risk factors for readmission: medical condition-abdominal pain and support system   Interventions to address risk factors: Scheduled appointment with PCP-Roxana    Care Transition Nurse (CTN) contacted the patient by telephone to perform post hospital discharge assessment. Verified name and  with patient as identifiers. Provided introduction to self, and explanation of the CTN role. CTN reviewed discharge instructions, medical action plan and red flags with patient who verbalized understanding. Were discharge instructions available to patient? yes. Reviewed appropriate site of care based on symptoms and resources available to patient including: PCP, Specialist and [address] [place] [address] Road.  Patient given an opportunity to ask questions and does not have any further questions or concerns at this time. The patient agrees to contact the PCP office for questions related to their healthcare. Medication reconciliation was performed with patient, who verbalizes understanding of administration of home medications. Advised obtaining a 90-day supply of all daily and as-needed medications. Referral to Pharm D needed: no     Home Health/Outpatient orders at discharge: [address]: n/a  Date of initial visit: [address] [place] [address] ordered at discharge: None  [address] [facility] [address] [facility] [address]: [address] [facility] received: n/a    Covid Risk Education    Educated patient about risk for severe COVID-19 due to risk factors according to CDC guidelines. CTN reviewed discharge instructions, medical action plan and red flag symptoms with the patient who verbalized understanding. Discussed COVID vaccination status: yes. Education provided on COVID-19 vaccination as appropriate. Discussed exposure protocols and quarantine with CDC Guidelines. Patient was given an opportunity to verbalize any questions and concerns and agrees to contact CTN or health care provider for questions related to their healthcare. Was patient discharged with a pulse oximeter? no. Discussed and confirmed pulse oximeter discharge instructions and when to notify provider or seek emergency care. Discussed follow-up appointments. If no appointment was previously scheduled, appointment scheduling offered: yes. Is follow up appointment scheduled within 7 days of discharge? yes.    [facility][1] follow up appointment(s):   Future Appointments   Date Time Provider Reina Duncan   3/2/2022 11:00 AM [facility] NURSE [facility] [address]   3/2/2022  3:00 PM Camilo Solano MD [facility] BS AMB   3/7/2022  8:45 AM Palma Bhatti MD [facility]  AMB   4/6/2022 11:00 AM MD Mayra [facility] follow up appointment(s): n/a    Plan for follow-up call in 5-7 days based on severity of symptoms and risk factors. Plan for next call: symptom management-ensure that patient abdominal pain has decreased. CTN provided contact information for future needs. Goals Addressed                 This Visit's Progress     Prevent complications post hospitalization. 1. CTN will monitor X 4 weeks    2. Ensure provider appt is scheduled within 7 days post-discharge 3/1/2022 Patient goes to PCP 3/2/2022. 3. Confirm patient attended post-discharge provider apt    4. Complete post-visit call to confirm attendance and update care needs  3/1/2022 patient stated that he is feeling pretty good today. He did have some blood in urine last night- none this am. Advised to monitor and let physician know at appt tomorrow. 5. Review/educate common or potential \"red flags\" of condition worsening 3/1/2022 Reviewed signs/symptoms of abdominal pain such as severe pain. Fever, bloody stools, persistent N & V, weight loss, tenderness  oer swelling in abdomen. 6. Evaluate adherence to medications and priority barriers to resolve  3/1/2022 Patient stated that he has all meds and takes as directed. 7. Instruct on adherence to medications as ordered and assess for therapeutic response and side-effects  3/1/2022 Patient stated that he knows why he is taking all meds and knows when he needs to call physician for any issues. 8. Discuss and evaluate ADL performance. Provide recommendations on energy conservation, particularly related to transition home from an inpatient admission. 3/1/2022 Patient stated that he moves about with a cane and he rests as needed.

## 2022-03-02 ENCOUNTER — OFFICE VISIT (OUTPATIENT)
Dept: FAMILY MEDICINE CLINIC | Age: 87
End: 2022-03-02
Payer: MEDICARE

## 2022-03-02 VITALS
OXYGEN SATURATION: 99 % | DIASTOLIC BLOOD PRESSURE: 71 MMHG | HEIGHT: 66 IN | BODY MASS INDEX: 16.59 KG/M2 | HEART RATE: 106 BPM | RESPIRATION RATE: 16 BRPM | SYSTOLIC BLOOD PRESSURE: 106 MMHG | WEIGHT: 103.2 LBS | TEMPERATURE: 97.3 F

## 2022-03-02 DIAGNOSIS — N40.0 PROSTATE HYPERTROPHY: Primary | ICD-10-CM

## 2022-03-02 DIAGNOSIS — N17.9 AKI (ACUTE KIDNEY INJURY) (HCC): ICD-10-CM

## 2022-03-02 DIAGNOSIS — Z91.89 AT RISK FOR INFECTION ASSOCIATED WITH FOLEY CATHETER: ICD-10-CM

## 2022-03-02 DIAGNOSIS — E78.00 HYPERCHOLESTEROLEMIA: ICD-10-CM

## 2022-03-02 DIAGNOSIS — E11.65 POORLY CONTROLLED TYPE 2 DIABETES MELLITUS (HCC): ICD-10-CM

## 2022-03-02 DIAGNOSIS — R97.20 ELEVATED PSA: ICD-10-CM

## 2022-03-02 PROCEDURE — 3046F HEMOGLOBIN A1C LEVEL >9.0%: CPT | Performed by: FAMILY MEDICINE

## 2022-03-02 PROCEDURE — G8427 DOCREV CUR MEDS BY ELIG CLIN: HCPCS | Performed by: FAMILY MEDICINE

## 2022-03-02 PROCEDURE — 99496 TRANSJ CARE MGMT HIGH F2F 7D: CPT | Performed by: FAMILY MEDICINE

## 2022-03-02 NOTE — PROGRESS NOTES
Tao Erazo is a 81 y.o. male (: 1935) presenting to address:    Chief Complaint   Patient presents with   [facility] Follow Up       There were no vitals filed for this visit. Hearing/Vision:   No exam data present    Learning Assessment:     Learning Assessment 2019   PRIMARY LEARNER Patient   PRIMARY LANGUAGE ENGLISH   LEARNER PREFERENCE PRIMARY LISTENING   ANSWERED BY patient   RELATIONSHIP SELF     Depression Screening:     3 most recent PHQ Screens 3/2/2022   Little interest or pleasure in doing things Not at all   Feeling down, depressed, irritable, or hopeless Not at all   Total Score PHQ 2 0     Fall Risk Assessment:     Fall Risk Assessment, last 12 mths 3/2/2022   Able to walk? Yes   Fall in past 12 months? 0   Do you feel unsteady? 0   Are you worried about falling 0   Is TUG test greater than 12 seconds? -   Is the gait abnormal? -   Number of falls in past 12 months -   Fall with injury? -     Abuse Screening:     Abuse Screening Questionnaire 2021   Do you ever feel afraid of your partner? N   Are you in a relationship with someone who physically or mentally threatens you? N   Is it safe for you to go home? Y     ADL Assessment:     ADL Assessment 2019   Feeding yourself No Help Needed   Getting from bed to chair No Help Needed   Getting dressed No Help Needed   Bathing or showering No Help Needed   Walk across the room (includes cane/walker) No Help Needed   Using the telphone No Help Needed   Taking your medications No Help Needed   Preparing meals No Help Needed   Managing money (expenses/bills) No Help Needed   Moderately strenuous housework (laundry) No Help Needed   Shopping for personal items (toiletries/medicines) No Help Needed   Shopping for groceries No Help Needed   Driving No Help Needed   Climbing a flight of stairs No Help Needed   Getting to places beyond walking distances No Help Needed        Coordination of Care Questionaire:   1.  Have you been to the ER, urgent care clinic since your last visit? Hospitalized since your last visit? [facility] 2. Have you seen or consulted any other health care providers outside of the [address] [place] [address] since your last visit? Include any pap smears or colon screening.  NO

## 2022-03-02 NOTE — PATIENT INSTRUCTIONS
Give 26 units of levemir daily  Goal fasting glucose 100-130  If > 130 increase levemir dose by 2 units each day  If < 100 decreased levemir dose by 2 units

## 2022-03-02 NOTE — PROGRESS NOTES
Rui Becker is a 81 y.o.  male and presents with    Chief Complaint   Patient presents with   [facility] Follow Up   2/26/2022 admitted to [facility] for  2/28/2022 discharged to home  3/2/2022 transition of care    Subjective:  He is following up after hospitalization; he has catheter and is scheduled for prostate surgery in 11 days. Pt presents to the clinic 3 days after being d/c from the hospital for UTI w/ AYAZ and uncontrolled DM. Pt endorses that this was his second visit in 1 month. He reports that one month ago he had a seizure that resulted in his being admitted to the hospital for 5 days. He was d/c with new medications that he cannot currently recall. Since that visit he has been fatigued, lethargic, and increasing his water intake to ~4-5 16 oz bottles a day. He has also noticed SOB, cold intolerance, and a 15 lb weight loss in the last month. Pt denies any change in appetite. On 2/25 pt was admitted to the hospital for UTI w/ AYAZ and uncontrolled DM. Pt endorses that he went to the ED because he was having difficulty urinating, abdominal pain, and  burning sensation with urination. He also reports fever, chills, chest pain, sweats, nausea, and vomiting. He was discharged with new prescriptions and a catheter that he is supposed to continue with until his prostate surgery 3/18. Pt has not checked his glucose since being d/c.     ROS   Constitutional:  No fever, chills, body aches  ENT: No sore throat or runny nose  Respiratory: No cough, shortness of breath, or wheezing  Cardiovascular: No chest pain or palpitations  Gastrointestinal: No abdominal pain, nausea, vomiting, diarrhea, or constipation  Genitourinary: No dysuria or frequency. Musculoskeletal: No swelling or joint pain  Integumentary: No rashes or lesions  Hematologic: No easy bruising/bleeding. Neurological: No headache or dizziness. All other systems reviewed and are negative.     Objective:  Vitals:    03/02/22 1534   BP: 106/71   Pulse: (!) 106   Resp: 16   Temp: 97.3 °F (36.3 °C)   TempSrc: Temporal   SpO2: 99%   Weight: 103 lb 3.2 oz (46.8 kg)   Height: 5' 6\" (1.676 m)   PainSc:   9       alert, well appearing, and in no distress  Mental status - alert, oriented to person, place, and time  Neck - supple, no significant adenopathy  Chest - clear to auscultation, no wheezes, rales or rhonchi, symmetric air entry  Heart - normal rate, regular rhythm, normal S1, S2, no murmurs, rubs, clicks or gallops  Abdomen - soft, nontender, nondistended, no masses or organomegaly  Extremities - peripheral pulses normal, no pedal edema, no clubbing or cyanosis    LABS     TESTS      Assessment/Plan:    1. Prostate hypertrophy  F/u with urology as scheduled    2. Poorly controlled type 2 diabetes mellitus (HCC)  Encourage compliance with medications; goal hgb a1c <7;     3. AYAZ (acute kidney injury) ([address]  Concern for potassium levels    4. Elevated PSA  Continue surveillance    5. At risk for infection associated with Carter catheter  Return to urology for scheduled follow-up      Lab review: orders written for new lab studies as appropriate; see orders      I have discussed the diagnosis with the patient and the intended plan as seen in the above orders. I have discussed medication side effects and warnings with the patient as well. I have reviewed the plan of care with the patient, accepted their input and they are in agreement with the treatment goals.

## 2022-03-02 NOTE — PROGRESS NOTES
Nitin Reynoso is a 81 y.o.  male and presents with    Chief Complaint   Patient presents with   [facility] Follow Up           Subjective:  Pt presents to the clinic 3 days after being d/c from the hospital for UTI w/ AYAZ and uncontrolled DM. Pt endorses that this was his second visit in 1 month. He reports that one month ago he had a seizure that resulted in his being admitted to the hospital for 5 days. He was d/c with new medications that he cannot currently recall. Since that visit he has been fatigued, lethargic, and increasing his water intake to ~4-5 16 oz bottles a day. He has also noticed SOB, cold intolerance, and a 15 lb weight loss in the last month. Pt denies any change in appetite. On 2/25 pt was admitted to the hospital for UTI w/ AYAZ and uncontrolled DM. Pt endorses that he went to the ED because he was having difficulty urinating, abdominal pain, and  burning sensation with urination. He also reports fever, chills, chest pain, sweats, nausea, and vomiting. He was discharged with new prescriptions and a catheter that he is supposed to continue with until his prostate surgery 3/18. Pt has not checked his glucose since being d/c. Past Medical History:   Diagnosis Date    Abdominal pain     Acute cystitis with hematuria     Bladder mass     Bladder pain     BPH (benign prostatic hyperplasia)     BPH with obstruction/lower urinary tract symptoms     Chronic prostatitis     Diabetes ([address]     ED (erectile dysfunction)     Elevated PSA     Essential hypertension     Hematuria     History of UTI     Hypercholesterolemia     Nocturia     Urinary tract infection, site not specified     Urine leukocytes        ROS   Negative except for as listed above. All other systems reviewed and are negative.       Objective:  Vitals:    03/02/22 1534   BP: 106/71   Pulse: (!) 106   Resp: 16   Temp: 97.3 °F (36.3 °C)   TempSrc: Temporal   SpO2: 99%   Weight: 103 lb 3.2 oz (46.8 kg)   Height: 5' 6\" (1.676 m)   PainSc:   9       alert, well appearing, and in no distress  Mental status - alert, oriented to person, place, and time  Neck - supple, no significant adenopathy  Chest - clear to auscultation, no wheezes, rales or rhonchi, symmetric air entry  Heart - normal rate, regular rhythm, normal S1, S2, no murmurs, rubs, clicks or gallops  Abdomen - soft, nontender, nondistended, no masses or organomegaly  Extremities - peripheral pulses normal, no pedal edema, no clubbing or cyanosis    LABS     TESTS      Assessment/Plan:    1. UTI w/ AYAZ  Continue w/ current medication regimen. Pt will continue to wear Carter Cath until prostate surgery 3/18. 2. Diabetes mellitus Type 2 uncontrolled  Pt educated on insulin dosing and encouraged to check glucose levels daily. I have discussed the diagnosis with the patient and the intended plan as seen in the above orders. The patient has received an after-visit summary and questions were answered concerning future plans. I have discussed medication side effects and warnings with the patient as well. I have reviewed the plan of care with the patient, accepted their input and they are in agreement with the treatment goals. *ATTENTION:  This note has been created by a medical student for educational purposes only. Please do not refer to the content of this note for clinical decision-making, billing, or other purposes. Please see attending physicians note to obtain clinical information on this patient. *

## 2022-03-08 ENCOUNTER — PATIENT OUTREACH (OUTPATIENT)
Dept: CASE MANAGEMENT | Age: 87
End: 2022-03-08

## 2022-03-08 NOTE — PROGRESS NOTES
3/8/2022  3:17 PM    CTN reviewed patient chart and then attempted to contact patient for routine follow up. He was unavailable at the time of the call. CTN left a voice mail  message introducing myself, the purpose of the call and giving my contact information. Requested that patient call back at his earliest convenience. Will follow.
operating room
endoscopy

## 2022-03-15 ENCOUNTER — PATIENT OUTREACH (OUTPATIENT)
Dept: CASE MANAGEMENT | Age: 87
End: 2022-03-15

## 2022-03-15 NOTE — PROGRESS NOTES
Care Transitions Follow Up Call    Challenges to be reviewed by the provider   Additional needs identified to be addressed with provider: no  none           Method of communication with provider : none    Care Transition Nurse (CTN) contacted the patient by telephone to follow up after admission on 2022 to 2022. Verified name and  with patient as identifiers. Addressed changes since last contact: none  Follow up appointment completed? yes. Was follow up appointment scheduled within 7 days of discharge? yes. Advance Care Planning:   Does patient have an Advance Directive:  yes; reviewed and current     CTN reviewed discharge instructions, medical action plan and red flags with patient and discussed any barriers to care and/or understanding of plan of care after discharge. Discussed appropriate site of care based on symptoms and resources available to patient including: [facility] and [employer]. The patient agrees to contact the PCP office for questions related to their healthcare. Patients top risk factors for readmission: medical condition-abdominal pain and support system   Interventions to address risk factors: Scheduled appointment with PCP-[facility] follow up appointment(s):   Future Appointments   Date Time Provider Reina Duncan   2022 11:00 AM MD Mayra Fletcher     [id number] [address] follow up appointment(s): n/a    CTN provided contact information for future needs. Plan for follow-up call in 5-7 days based on severity of symptoms and risk factors. Plan for next call: medication management-ensure patient has all meds and is taking as directed. Also ensure that patient knows when to call physician for any issues. Goals Addressed                 This Visit's Progress     Prevent complications post hospitalization. 1. CTN will monitor X 4 weeks    2.  Ensure provider appt is scheduled within 7 days post-discharge 3/1/2022 Patient goes to PCP 3/2/2022. 3. Confirm patient attended post-discharge provider apt    4. Complete post-visit call to confirm attendance and update care needs  3/1/2022 patient stated that he is feeling pretty good today. He did have some blood in urine last night- none this am. Advised to monitor and let physician know at appt tomorrow. 3/15/2022 Patient stated that he is having surgery on prostate Fri. He is doing well. No care needs noted. 5. Review/educate common or potential \"red flags\" of condition worsening 3/1/2022 Reviewed signs/symptoms of abdominal pain such as severe pain. Fever, bloody stools, persistent N & V, weight loss, tenderness  oer swelling in abdomen. 6. Evaluate adherence to medications and priority barriers to resolve  3/1/2022 Patient stated that he has all meds and takes as directed. 3/15/2022 Patient stated that he has all meds and istaking as prescribed. 7. Instruct on adherence to medications as ordered and assess for therapeutic response and side-effects  3/1/2022 Patient stated that he knows why he is taking all meds and knows when he needs to call physician for any issues. 3/15/2022 Patient has no questions or concerns about his meds. 8. Discuss and evaluate ADL performance. Provide recommendations on energy conservation, particularly related to transition home from an inpatient admission. 3/1/2022 Patient stated that he moves about with a cane and he rests as needed. 3/15/2022 Patient stated that he is doing well getting around. No complaints at present.

## 2022-03-18 PROBLEM — R73.9 HYPERGLYCEMIA: Status: ACTIVE | Noted: 2022-02-25

## 2022-03-18 PROBLEM — Z98.890 STATUS POST EXPLORATORY LAPAROTOMY: Status: ACTIVE | Noted: 2019-04-04

## 2022-03-18 PROBLEM — R10.9 ABDOMINAL PAIN: Status: ACTIVE | Noted: 2022-02-25

## 2022-03-18 PROBLEM — N40.0 BPH (BENIGN PROSTATIC HYPERPLASIA): Status: ACTIVE | Noted: 2019-02-21

## 2022-03-18 RX ORDER — ALFUZOSIN HYDROCHLORIDE 10 MG/1
10 TABLET, EXTENDED RELEASE ORAL
Qty: 90 TABLET | Refills: 3 | Status: SHIPPED | OUTPATIENT
Start: 2022-03-18 | End: 2022-06-08

## 2022-03-18 RX ORDER — DUTASTERIDE 0.5 MG/1
0.5 CAPSULE, LIQUID FILLED ORAL
Qty: 90 CAPSULE | Refills: 3 | Status: SHIPPED | OUTPATIENT
Start: 2022-03-18 | End: 2022-06-08

## 2022-03-18 RX ORDER — PRAVASTATIN SODIUM 40 MG/1
40 TABLET ORAL
Qty: 90 TABLET | Refills: 3 | Status: SHIPPED | OUTPATIENT
Start: 2022-03-18 | End: 2022-09-08 | Stop reason: ALTCHOICE

## 2022-03-19 PROBLEM — R11.2 NAUSEA WITH VOMITING: Status: ACTIVE | Noted: 2022-01-28

## 2022-03-19 PROBLEM — E11.21 TYPE 2 DIABETES WITH NEPHROPATHY (HCC): Status: ACTIVE | Noted: 2019-02-21

## 2022-03-19 PROBLEM — E86.0 DEHYDRATION: Status: ACTIVE | Noted: 2022-01-28

## 2022-03-19 PROBLEM — N17.9 ACUTE KIDNEY INJURY (HCC): Status: ACTIVE | Noted: 2022-02-25

## 2022-03-19 PROBLEM — K57.20 PERFORATED DIVERTICULUM OF LARGE INTESTINE: Status: ACTIVE | Noted: 2019-04-04

## 2022-03-19 PROBLEM — E55.9 VITAMIN D DEFICIENCY: Status: ACTIVE | Noted: 2022-02-10

## 2022-03-19 PROBLEM — I95.9 HYPOTENSION: Status: ACTIVE | Noted: 2022-01-28

## 2022-03-19 PROBLEM — Z71.89 ADVANCE CARE PLANNING: Status: ACTIVE | Noted: 2019-04-11

## 2022-03-20 PROBLEM — E11.9 DIABETES (HCC): Status: ACTIVE | Noted: 2019-02-21

## 2022-03-20 PROBLEM — R97.20 ELEVATED PSA: Status: ACTIVE | Noted: 2017-11-07

## 2022-03-20 PROBLEM — R55 SYNCOPE AND COLLAPSE: Status: ACTIVE | Noted: 2022-01-28

## 2022-03-20 PROBLEM — N17.9 AKI (ACUTE KIDNEY INJURY) (HCC): Status: ACTIVE | Noted: 2022-02-25

## 2022-03-20 PROBLEM — E78.00 HYPERCHOLESTEROLEMIA: Status: ACTIVE | Noted: 2019-02-21

## 2022-03-22 ENCOUNTER — PATIENT OUTREACH (OUTPATIENT)
Dept: CASE MANAGEMENT | Age: 87
End: 2022-03-22

## 2022-03-22 NOTE — PROGRESS NOTES
Care Transitions Follow Up Call    Challenges to be reviewed by the provider   Additional needs identified to be addressed with provider: no  none           Method of communication with provider : none    Care Transition Nurse (CTN) contacted the patient by telephone to follow up after admission on 2022 to 2022. Verified name and  with patient as identifiers. Addressed changes since last contact: none  Follow up appointment completed? yes. Was follow up appointment scheduled within 7 days of discharge? yes. Advance Care Planning:   Does patient have an Advance Directive:  yes; reviewed and current     CTN reviewed discharge instructions, medical action plan and red flags with patient and discussed any barriers to care and/or understanding of plan of care after discharge. Discussed appropriate site of care based on symptoms and resources available to patient including: PCP, Specialist and [employer]. The patient agrees to contact the PCP office for questions related to their healthcare. Patients top risk factors for readmission: medical condition-abdominal pain and support system   Interventions to address risk factors: Scheduled appointment with PCP-Roxana Leal Dr follow up appointment(s):   Future Appointments   Date Time Provider Reina Duncan   3/22/2022  2:45 PM Romana Knudsen, MD [facility] AMB   2022 11:00 AM Terressa Duverney, PA [facility]   2022  1:15 PM Norma Gómez MD [id number]     [id number] [address] follow up appointment(s): n/a    CTN provided contact information for future needs. Plan for follow-up call in 5-7 days based on severity of symptoms and risk factors. Plan for next call: self management-ensure that pateint knows who to call if he needs assistance. Goals Addressed                 This Visit's Progress     Prevent complications post hospitalization. 1. CTN will monitor X 4 weeks    2.  Ensure provider appt is scheduled within 7 days post-discharge 3/1/2022 Patient goes to PCP 3/2/2022. 3. Confirm patient attended post-discharge provider apt    4. Complete post-visit call to confirm attendance and update care needs  3/1/2022 patient stated that he is feeling pretty good today. He did have some blood in urine last night- none this am. Advised to monitor and let physician know at appt tomorrow. 3/15/2022 Patient stated that he is having catheter removal Fri. He is doing well. No care needs noted. 3/22/2022 Patient stated that he is doing well. No care needs noted. 5. Review/educate common or potential \"red flags\" of condition worsening 3/1/2022 Reviewed signs/symptoms of abdominal pain such as severe pain. Fever, bloody stools, persistent N & V, weight loss, tenderness  oer swelling in abdomen. 6. Evaluate adherence to medications and priority barriers to resolve  3/1/2022 Patient stated that he has all meds and takes as directed. 3/15/2022 Patient stated that he has all meds and is taking as prescribed. 3/22/2022 Patient has all meds and is taking as he should. 7. Instruct on adherence to medications as ordered and assess for therapeutic response and side-effects  3/1/2022 Patient stated that he knows why he is taking all meds and knows when he needs to call physician for any issues. 3/15/2022 Patient has no questions or concerns about his meds. 3/22/2022 No questions about meds at present time. 8. Discuss and evaluate ADL performance. Provide recommendations on energy conservation, particularly related to transition home from an inpatient admission. 3/1/2022 Patient stated that he moves about with a cane and he rests as needed. 3/15/2022 Patient stated that he is doing well getting around. No complaints at present. 3/22/2022 patient stated that he is doing well. No concerns at present time.

## 2022-03-29 ENCOUNTER — NURSE TRIAGE (OUTPATIENT)
Dept: OTHER | Facility: CLINIC | Age: 87
End: 2022-03-29

## 2022-03-29 NOTE — TELEPHONE ENCOUNTER
Received call from  [facility] with Red Flag Complaint. Subjective: Caller states \"Hematuria post op\"     Current Symptoms: 80 y/o  With  Hematuria, burning with urinating, passing of clots, frequent urinating, and  Hesitancy      Post operative care 3/18    Onset: 2 days ago     Associated Symptoms:   Post operative  3/18    Pain Severity:   Burning with urination     Temperature: denies       What has been tried: n/a       Recommended disposition: Go to ED/UCC Now (Or to Office with PCP Approval)  [facility] advice provided, patient verbalizes understanding; denies any other questions or concerns; instructed to call back for any new or worsening symptoms. warm transfer to Alee Do at [address] [place] [address]  in [facility]     Attention Provider: Thank you for allowing me to participate in the care of your patient. The patient was connected to triage in response to information provided to the ECC. Please do not respond through this encounter as the response is not directed to a shared pool.       Reason for Disposition   Pain or burning with passing urine (urination) and male   Unable to urinate (or only a few drops) and bladder feels very full    Protocols used: POST-OP SYMPTOMS AND QUESTIONS-ADULT-[place], URINATION PAIN - MALE-ADULT-[place]

## 2022-04-04 ENCOUNTER — PATIENT OUTREACH (OUTPATIENT)
Dept: CASE MANAGEMENT | Age: 87
End: 2022-04-04

## 2022-04-04 NOTE — PROGRESS NOTES
Patient has graduated from the Transitions of Care Coordination  program on 4/4/2022. Patient's symptoms are stable at this time. Patient/family has the ability to self-manage. Care management goals have been completed at this time. No further care transitions nurse follow up scheduled. Goals Addressed                 This Visit's Progress     COMPLETED: Prevent complications post hospitalization. 1. CTN will monitor X 4 weeks    2. Ensure provider appt is scheduled within 7 days post-discharge 3/1/2022 Patient goes to PCP 3/2/2022. 3. Confirm patient attended post-discharge provider apt     4. Complete post-visit call to confirm attendance and update care needs  3/1/2022 patient stated that he is feeling pretty good today. He did have some blood in urine last night- none this am. Advised to monitor and let physician know at appt tomorrow. 3/15/2022 Patient stated that he is having catheter removal Fri. He is doing well. No care needs noted. 3/22/2022 Patient stated that he is doing well. No care needs noted. 4/4/2022 Patient stated that he is doing well No care needs at present time. 5. Review/educate common or potential \"red flags\" of condition worsening 3/1/2022 Reviewed signs/symptoms of abdominal pain such as severe pain. Fever, bloody stools, persistent N & V, weight loss, tenderness  oer swelling in abdomen. 6. Evaluate adherence to medications and priority barriers to resolve  3/1/2022 Patient stated that he has all meds and takes as directed. 3/15/2022 Patient stated that he has all meds and is taking as prescribed. 3/22/2022 Patient has all meds and is taking as he should. 4/4/2022 Patient continues to have all meds and takes as instructed. 7. Instruct on adherence to medications as ordered and assess for therapeutic response and side-effects  3/1/2022 Patient stated that he knows why he is taking all meds and knows when he needs to call physician for any issues.  3/15/2022 Patient has no questions or concerns about his meds. 3/22/2022 No questions about meds at present time. 4/4/2022 No concerns about meds. 8. Discuss and evaluate ADL performance. Provide recommendations on energy conservation, particularly related to transition home from an inpatient admission. 3/1/2022 Patient stated that he moves about with a cane and he rests as needed. 3/15/2022 Patient stated that he is doing well getting around. No complaints at present. 3/22/2022 patient stated that he is doing well. No concerns at present time. 4/4/2022 Patient stated that he is doing well. He is moving about and rests as needed. Patient has care transitions nurse contact information for any further questions, concerns, or needs.   Patient's upcoming visits:    Future Appointments   Date Time Provider Reina Duncan   4/20/2022 11:00 AM Mario Saucedo   8/17/2022  1:15 PM MD Mayra Hernandez

## 2022-04-05 DIAGNOSIS — R63.4 WEIGHT LOSS: ICD-10-CM

## 2022-04-06 RX ORDER — MEGESTROL ACETATE 20 MG/1
TABLET ORAL
Qty: 30 TABLET | Refills: 2 | OUTPATIENT
Start: 2022-04-06

## 2022-04-11 ENCOUNTER — PATIENT OUTREACH (OUTPATIENT)
Dept: CASE MANAGEMENT | Age: 87
End: 2022-04-11

## 2022-04-11 NOTE — PROGRESS NOTES
Ambulatory Care Coordination    Date/Time:  4/11/2022 1:03 PM     Attempted to reach patient by telephone. Left HIPPA compliant message requesting a return call. Will attempt to reach patient again. 1st attempt to reach patient.   Jacki Jose LPN

## 2022-04-12 ENCOUNTER — PATIENT OUTREACH (OUTPATIENT)
Dept: CASE MANAGEMENT | Age: 87
End: 2022-04-12

## 2022-04-12 NOTE — PROGRESS NOTES
Ambulatory Care Coordination 4/12/2022    2nd attempt to reach patient. Spoke with patient. He states he is doing fine. He has an appointment with Urology on 4/19/2022. He denies any blood when urinating at this time. Patient declines CCM at this time. He states he is staying with his girlfriend and does not need it, maybe when he returns home. Timeframe unknown.   Mark Vincent LPN

## 2022-06-24 ENCOUNTER — PATIENT OUTREACH (OUTPATIENT)
Dept: CASE MANAGEMENT | Age: 87
End: 2022-06-24

## 2022-06-24 NOTE — PROGRESS NOTES
Complex Case Management      Date/Time:  2022 3:46 PM    Method of communication with patient:phone    [facility] ([facility]) contacted the patient by telephone to perform Ambulatory Care Coordination. Verified name and  (PHI) with patient as identifiers. Provided introduction to self, and explanation of the Ambulatory Care Manager's role. Reviewed most recent clinic visit w/ patient who verbalized understanding. Patient given an opportunity to ask questions. Top Challenges reviewed with the patient   1. States he has not checked blood glucose \"in weeks\". Instructed to begin checking at least daily. Verbalized understanding and states he will check BS when he returns home tonight. 2. States he has frequent UTIs but denies burning, pain or odor with voiding at this time. The patient agrees to contact the PCP office or the [address] [place] [address] for questions related to their healthcare. Provided contact information for future reference. Disease Specific:   N/A    Home Health Active: No    DME Active: No    Barriers to care? Lack of knowledge about disease    Advance Care Planning:   Does patient have an Advance Directive:  reviewed and current     Medication(s):   Medication reconciliation was performed with patient, who verbalizes understanding of administration of home medications. There were no barriers to obtaining medications identified at this time. Referral to Pharm D needed: no     Current Outpatient Medications   Medication Sig    empagliflozin (Jardiance) 25 mg tablet Take 1 Tablet by mouth daily.  glucose blood VI test strips (OneTouch Verio test strips) strip Test blood glucose twice a day    Insulin Needles, Disposable, 31 gauge x 5/16\" ndle Give insulin once a day    ONE TOUCH DELICA 33 gauge misc Test blood glucose 3 times a day    nitrofurantoin, macrocrystal-monohydrate, (MACROBID) 100 mg capsule Take 100 mg by mouth two (2) times a day.  (Patient not taking: Reported on 6/24/2022)    pravastatin (PRAVACHOL) 40 mg tablet Take 1 Tablet by mouth nightly. (Patient not taking: Reported on 6/24/2022)    ergocalciferol (ERGOCALCIFEROL) 1,250 mcg (50,000 unit) capsule Take 1 Capsule by mouth every seven (7) days. (Patient not taking: Reported on 6/24/2022)    senna-docusate (PERICOLACE) 8.6-50 mg per tablet Take 2 Tablets by mouth nightly. (Patient not taking: Reported on 6/24/2022)    famotidine (PEPCID) 20 mg tablet Take 1 Tablet by mouth daily. (Patient not taking: Reported on 6/24/2022)    nut.tx.gluc.intol,lac-free,soy (Glucerna Shake) liqd Take 1 Bottle by mouth two (2) times a day. (Patient not taking: Reported on 6/24/2022)    albuterol (PROVENTIL HFA, VENTOLIN HFA, PROAIR HFA) 90 mcg/actuation inhaler Take 1 Puff by inhalation every six (6) hours as needed for Wheezing or Cough. (Patient not taking: Reported on 6/24/2022)     No current facility-administered medications for this visit.        [facility] follow up appointment(s):   Future Appointments   Date Time Provider Reina Duncan   9/8/2022  2:30 PM MD Mayra Barbosa        Non-[facility] follow up appointment(s): NA    Goals Addressed                 This Visit's Progress     Demonstrate self-management skills        Patient will   check blood glucose at least daily  Decrease number of 'diet' sodas he drinks daily  Limit amount of Ensure he drinks daily  Monitor carbohydrate intake  Eat balanced diet  Fluid intake 6-8 glasses daily

## 2022-06-27 ENCOUNTER — PATIENT OUTREACH (OUTPATIENT)
Dept: CASE MANAGEMENT | Age: 87
End: 2022-06-27

## 2022-06-27 NOTE — PROGRESS NOTES
Complex Case Management       Date/Time:  6/27/2022 10:10 AM     Attempted to reach patient by telephone. Unable to leave HIPPA compliant message requesting a return call. Will attempt to reach patient at a later time.

## 2022-06-30 ENCOUNTER — PATIENT OUTREACH (OUTPATIENT)
Dept: CASE MANAGEMENT | Age: 87
End: 2022-06-30

## 2022-06-30 NOTE — PROGRESS NOTES
Complex Case Management       Date/Time:  6/30/2022 9:38 AM     Attempted to reach patient by telephone. Left HIPPA compliant message with Ms. Jasmyne Crockett (listed on [facility]) requesting a return call. Will attempt to reach patient at a later time.

## 2022-07-08 ENCOUNTER — PATIENT OUTREACH (OUTPATIENT)
Dept: CASE MANAGEMENT | Age: 87
End: 2022-07-08

## 2022-07-08 NOTE — PROGRESS NOTES
Complex Case Management      Date/Time:  2022 10:50 AM    Method of communication with patient:phone    [address] [place] [address] ([facility]) contacted the patient by telephone to perform Ambulatory Care Coordination. Verified name and  (PHI) with patient as identifiers. Provided introduction to self, and explanation of the Ambulatory Care Manager's role. Top Challenges reviewed with the patient   1. Patient 'forgot' to check BS this past week, states he will begin checking before meals. Says he was told to take Jardiance twice a day and this is what he is doing. 2. Reports that he feels \"real good\" this week. 3. Reports urine is clear, voiding with burning or pain. Forcing fluids, mainly water. The patient agrees to contact the PCP office or the [address] [place] [address] for questions related to their healthcare. Provided contact information for future reference. Medication(s):   Medication reconciliation was performed with patient, who verbalizes understanding of administration of home medications. There were no barriers to obtaining medications identified at this time. Referral to Pharm D needed: no     Current Outpatient Medications   Medication Sig    empagliflozin (Jardiance) 25 mg tablet Take 1 Tablet by mouth daily.  glucose blood VI test strips (OneTouch Verio test strips) strip Test blood glucose twice a day    nut.tx.gluc.intol,lac-free,soy (Glucerna Shake) liqd Take 1 Bottle by mouth two (2) times a day.  Insulin Needles, Disposable, 31 gauge x 5/16\" ndle Give insulin once a day    ONE TOUCH DELICA 33 gauge misc Test blood glucose 3 times a day    nitrofurantoin, macrocrystal-monohydrate, (MACROBID) 100 mg capsule Take 100 mg by mouth two (2) times a day. (Patient not taking: Reported on 2022)    pravastatin (PRAVACHOL) 40 mg tablet Take 1 Tablet by mouth nightly.  (Patient not taking: Reported on 2022)    ergocalciferol (ERGOCALCIFEROL) 1,250 mcg (50,000 unit) capsule Take 1 Capsule by mouth every seven (7) days. (Patient not taking: Reported on 6/24/2022)    senna-docusate (PERICOLACE) 8.6-50 mg per tablet Take 2 Tablets by mouth nightly. (Patient not taking: Reported on 6/24/2022)    famotidine (PEPCID) 20 mg tablet Take 1 Tablet by mouth daily. (Patient not taking: Reported on 6/24/2022)    albuterol (PROVENTIL HFA, VENTOLIN HFA, PROAIR HFA) 90 mcg/actuation inhaler Take 1 Puff by inhalation every six (6) hours as needed for Wheezing or Cough. (Patient not taking: Reported on 6/24/2022)     No current facility-administered medications for this visit.        [facility] follow up appointment(s):   Future Appointments   Date Time Provider Reina Duncan   9/8/2022  2:30 PM Samanta Hector MD [address]        Non-[facility] follow up appointment(s): NA    Goals Addressed                 This Visit's Progress     Demonstrate self-management skills   No change     Patient will   check blood glucose at least daily  Decrease number of 'diet' sodas he drinks daily  Limit amount of Ensure he drinks daily  Monitor carbohydrate intake  Eat balanced diet  Fluid intake 6-8 glasses daily

## 2022-07-15 ENCOUNTER — PATIENT OUTREACH (OUTPATIENT)
Dept: CASE MANAGEMENT | Age: 87
End: 2022-07-15

## 2022-07-15 NOTE — PROGRESS NOTES
Complex Case Management       Date/Time:  7/15/2022 2:12 PM     Attempted to reach patient by telephone. Left HIPPA compliant message requesting a return call. Will attempt to reach patient at a later time.

## 2022-07-21 ENCOUNTER — PATIENT OUTREACH (OUTPATIENT)
Dept: CASE MANAGEMENT | Age: 87
End: 2022-07-21

## 2022-07-21 RX ORDER — IBUPROFEN 200 MG
200 TABLET ORAL
COMMUNITY
End: 2022-09-08 | Stop reason: ALTCHOICE

## 2022-07-21 NOTE — PROGRESS NOTES
Complex Case Management      Date/Time:  2022 11:18 AM    Method of communication with patient:phone    [facility] ([facility]) contacted the patient by telephone to perform Ambulatory Care Coordination. Verified name and  (PHI) with patient as identifiers. Provided introduction to self, and explanation of the Ambulatory Care Manager's role. Patient given an opportunity to ask questions. Patient states that he needs to see physician because he needs medications and is \"losing weight\" but his PCP doesn't have availability any time soon. Patient will call [facility] to see if they can see him sooner. Top Challenges reviewed with the Provider   NA     The patient agrees to contact the PCP office or the [facility] for questions related to their healthcare. Provided contact information for future reference. Disease Specific:    DM - has not checked BS \"in a while\"   Instructed to get glucometer, strips, and lancets together in one place and begin checking BS at least daily before breakfast. Reviewed s/s hypo/hyperglycemia. Patient verbalizes understanding. Medication(s):   Medication reconciliation was performed with patient, who verbalizes understanding of administration of home medications. There were no barriers to obtaining medications identified at this time. Referral to Pharm D needed: no     Current Outpatient Medications   Medication Sig    ibuprofen (AdviL) 200 mg tablet Take 200 mg by mouth every eight (8) hours as needed for Pain.    empagliflozin (Jardiance) 25 mg tablet Take 1 Tablet by mouth daily. (Patient taking differently: Take 25 mg by mouth in the morning. Reports he is taking twice a day before meals)    nut.tx.gluc.intol,lac-free,soy (Glucerna Shake) liqd Take 1 Bottle by mouth two (2) times a day. nitrofurantoin, macrocrystal-monohydrate, (MACROBID) 100 mg capsule Take 100 mg by mouth two (2) times a day.  (Patient not taking: No sig reported) pravastatin (PRAVACHOL) 40 mg tablet Take 1 Tablet by mouth nightly. (Patient not taking: No sig reported)    ergocalciferol (ERGOCALCIFEROL) 1,250 mcg (50,000 unit) capsule Take 1 Capsule by mouth every seven (7) days. (Patient not taking: No sig reported)    senna-docusate (PERICOLACE) 8.6-50 mg per tablet Take 2 Tablets by mouth nightly. (Patient not taking: No sig reported)    famotidine (PEPCID) 20 mg tablet Take 1 Tablet by mouth daily. (Patient not taking: No sig reported)    glucose blood VI test strips (OneTouch Verio test strips) strip Test blood glucose twice a day (Patient not taking: Reported on 7/21/2022)    albuterol (PROVENTIL HFA, VENTOLIN HFA, PROAIR HFA) 90 mcg/actuation inhaler Take 1 Puff by inhalation every six (6) hours as needed for Wheezing or Cough. (Patient not taking: No sig reported)    Insulin Needles, Disposable, 31 gauge x 5/16\" ndle Give insulin once a day (Patient not taking: Reported on 7/21/2022)    ONE TOUCH DELICA 33 gauge misc Test blood glucose 3 times a day (Patient not taking: Reported on 7/21/2022)     No current facility-administered medications for this visit.        [facility] follow up appointment(s):   Future Appointments   Date Time Provider Reina Duncan   9/8/2022  2:30 PM Luis Jacinto MD [address]        Non-[facility] follow up appointment(s): Urologist - 8/17/22     Goals Addressed                   This Visit's Progress     Demonstrate self-management skills        Patient will   check blood glucose at least daily  Decrease number of 'diet' sodas he drinks daily  Limit amount of Ensure he drinks daily  Monitor carbohydrate intake  Eat balanced diet  Fluid intake 6-8 glasses daily        7/21/22  Patient still is not checking BS

## 2022-08-03 ENCOUNTER — PATIENT OUTREACH (OUTPATIENT)
Dept: CASE MANAGEMENT | Age: 87
End: 2022-08-03

## 2022-08-03 NOTE — PROGRESS NOTES
Complex Case Management       Date/Time:  8/3/2022 9:30 AM     Attempted to reach patient by telephone. Left HIPPA compliant message requesting a return call. Will attempt to reach patient at a later time.

## 2022-08-04 ENCOUNTER — PATIENT OUTREACH (OUTPATIENT)
Dept: CASE MANAGEMENT | Age: 87
End: 2022-08-04

## 2022-08-04 NOTE — PROGRESS NOTES
Complex Case Management  Follow-Up       Date/Time:   8/4/22  10:58 AM       Ambulatory Care Manager ( ACM) contacted patient for Complex Case Management  follow up. Spoke to patient  Introduced self/role and reason for call. Patient reported: he continues to take Jardiance twice a day and is not taking any of his other medications. Pertinent concerns: per Dr. Karlene Garcia \" He needs to follow-up with me to determine the best way to improve his glucose\". Patient is not checking BS, has not done so in weeks. Specialist appointments since last outreach? NA   If so, specialist and date: NA    Next PCP Appointment: ACM contacted office and scheduled appointment for patient. Appointment is 8/30/22 @ 11:45 AM    Medications:     New medications since last outreach: no  Does patient need refills on any medications: no   Medication changes since last outreach (dose adjustments or discontinued meds): no. Patient not taking any medications except Jardiance and Advil. Home Health company: NA    Barriers to care? PCP relationship; understanding of diagnosis        Patient reminded that there are physicians on call 24 hours a day / 7 days a week (M-F 5pm to 8am and from Friday 5pm until Monday 8a for the weekend) should the patient have questions or concerns.

## 2022-08-19 ENCOUNTER — PATIENT OUTREACH (OUTPATIENT)
Dept: CASE MANAGEMENT | Age: 87
End: 2022-08-19

## 2022-08-19 NOTE — PROGRESS NOTES
Complex Case Management  Follow-Up       Date/Time:   8/19/22  10:27 AM       Ambulatory Care Manager ( ACM) contacted patient for Complex Case Management  follow up. Spoke to patient  Introduced self/role and reason for call. Patient reported: \" I feel better than I did when taking all those pills\". Pertinent concerns:Patient is still not checking BS. Denies any signs of UTI. Specialist appointments since last outreach? no   If so, specialist and date: NA    Next PCP Appointment: 8/30/22    Medications:     New medications since last outreach: no  Does patient need refills on any medications: no   Medication changes since last outreach (dose adjustments or discontinued meds): no    Patient is not taking any medications except  Jardiance and [address]: NA    Barriers to care? Refusal to take medications or check BS, understanding of damage that can be done with hyperglycemia. Patient reminded that there are physicians on call 24 hours a day / 7 days a week (M-F 5pm to 8am and from Friday 5pm until Monday 8a for the weekend) should the patient have questions or concerns.          Goals        Demonstrate self-management skills      Patient will   check blood glucose at least daily  Decrease number of 'diet' sodas he drinks daily  Limit amount of Ensure he drinks daily  Monitor carbohydrate intake  Eat balanced diet  Fluid intake 6-8 glasses daily        7/21/22  Patient still is not checking BS

## 2022-08-30 ENCOUNTER — OFFICE VISIT (OUTPATIENT)
Dept: FAMILY MEDICINE CLINIC | Age: 87
End: 2022-08-30
Payer: MEDICARE

## 2022-08-30 VITALS
OXYGEN SATURATION: 98 % | RESPIRATION RATE: 16 BRPM | SYSTOLIC BLOOD PRESSURE: 115 MMHG | DIASTOLIC BLOOD PRESSURE: 62 MMHG | HEIGHT: 67 IN | WEIGHT: 110 LBS | TEMPERATURE: 98.5 F | BODY MASS INDEX: 17.27 KG/M2 | HEART RATE: 86 BPM

## 2022-08-30 DIAGNOSIS — E78.00 HYPERCHOLESTEROLEMIA: ICD-10-CM

## 2022-08-30 DIAGNOSIS — E11.21 TYPE 2 DIABETES WITH NEPHROPATHY (HCC): ICD-10-CM

## 2022-08-30 DIAGNOSIS — Z00.00 MEDICARE ANNUAL WELLNESS VISIT, SUBSEQUENT: ICD-10-CM

## 2022-08-30 DIAGNOSIS — E11.65 POORLY CONTROLLED TYPE 2 DIABETES MELLITUS (HCC): Primary | ICD-10-CM

## 2022-08-30 LAB — HBA1C MFR BLD HPLC: 13.9 %

## 2022-08-30 PROCEDURE — G8432 DEP SCR NOT DOC, RNG: HCPCS | Performed by: FAMILY MEDICINE

## 2022-08-30 PROCEDURE — G0439 PPPS, SUBSEQ VISIT: HCPCS | Performed by: FAMILY MEDICINE

## 2022-08-30 PROCEDURE — 1101F PT FALLS ASSESS-DOCD LE1/YR: CPT | Performed by: FAMILY MEDICINE

## 2022-08-30 PROCEDURE — 99215 OFFICE O/P EST HI 40 MIN: CPT | Performed by: FAMILY MEDICINE

## 2022-08-30 PROCEDURE — 1123F ACP DISCUSS/DSCN MKR DOCD: CPT | Performed by: FAMILY MEDICINE

## 2022-08-30 PROCEDURE — G8536 NO DOC ELDER MAL SCRN: HCPCS | Performed by: FAMILY MEDICINE

## 2022-08-30 PROCEDURE — 3046F HEMOGLOBIN A1C LEVEL >9.0%: CPT | Performed by: FAMILY MEDICINE

## 2022-08-30 PROCEDURE — G8427 DOCREV CUR MEDS BY ELIG CLIN: HCPCS | Performed by: FAMILY MEDICINE

## 2022-08-30 PROCEDURE — G8418 CALC BMI BLW LOW PARAM F/U: HCPCS | Performed by: FAMILY MEDICINE

## 2022-08-30 PROCEDURE — 83036 HEMOGLOBIN GLYCOSYLATED A1C: CPT | Performed by: FAMILY MEDICINE

## 2022-08-30 RX ORDER — BLOOD-GLUCOSE METER
EACH MISCELLANEOUS
Qty: 100 STRIP | Refills: 3 | Status: SHIPPED | OUTPATIENT
Start: 2022-08-30 | End: 2022-09-08 | Stop reason: ALTCHOICE

## 2022-08-30 NOTE — PATIENT INSTRUCTIONS
Test blood glucose every morning. Goal is <150.   Every 2 days increase the levemir by 2 units until you are at goal.
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
## 2022-08-30 NOTE — PROGRESS NOTES
Theressa Galeazzi presents today for Annual Medicare wellness exam.  Chief Complaint   Patient presents with    Diabetes    Benign Prostatic Hypertrophy       Is someone accompanying this pt? no    Is the patient using any DME equipment during [address]? Yes a cane    Depression Screening:  3 most recent PHQ Screens 8/30/2022   Little interest or pleasure in doing things Not at all   Feeling down, depressed, irritable, or hopeless Not at all   Total Score PHQ 2 0       Learning Assessment:  Learning Assessment 6/28/2019   PRIMARY LEARNER Patient   PRIMARY LANGUAGE ENGLISH   LEARNER PREFERENCE PRIMARY LISTENING   ANSWERED BY patient   RELATIONSHIP SELF       Abuse Screening:  Abuse Screening Questionnaire 5/25/2021   Do you ever feel afraid of your partner? N   Are you in a relationship with someone who physically or mentally threatens you? N   Is it safe for you to go home? Y       Fall Risk  Fall Risk Assessment, last 12 mths 8/30/2022   Able to walk? Yes   Fall in past 12 months? 0   Do you feel unsteady? 0   Are you worried about falling 0   Is TUG test greater than 12 seconds? -   Is the gait abnormal? -   Number of falls in past 12 months -   Fall with injury? -       Health Maintenance reviewed and discussed and ordered per Provider. Health Maintenance Due   Topic Date Due    Eye Exam Retinal or Dilated  Never done    Shingrix Vaccine Age 49> (1 of 2) Never done    Pneumococcal 65+ years (2 - PCV) 09/10/2014    COVID-19 Vaccine (3 - Booster for Pfizer series) 11/11/2021    Foot Exam Q1  08/24/2022    Medicare Yearly Exam  08/25/2022   . Coordination of Care:  1. Have you been to the ER, urgent care clinic since your last visit? Hospitalized since your last visit? Yes, 4/2/22, [facility] ER, post op complication. 2. Have you seen or consulted any other health care providers outside of the [address] [place] [address] since your last visit? Include any pap smears or colon screening.  no      Last  Checked na  Last UDS Checked na  Last Pain contract signed: [email or web address]    Annual Medicare Wellness Exam

## 2022-09-02 ENCOUNTER — PATIENT OUTREACH (OUTPATIENT)
Dept: CASE MANAGEMENT | Age: 87
End: 2022-09-02

## 2022-09-02 NOTE — PROGRESS NOTES
Complex Case Management  Follow-Up       Date/Time:   9/2/22  12:47 PM       Ambulatory Care Manager ( ACM) contacted patient for Complex Case Management  follow up. Spoke to patient  Introduced self/role and reason for call. Patient reported: States he is feeling good. Had PCP appointment on 8/30/22 and \" he threw away all my medicines except the insulin, Jardiance and Motrin\"    Is in the middle of moving to another apartment, will be on 8th floor     Pertinent concerns:Patient has not begun checking BS. Says he will take BS when he gets home and call ACM with results. Has not begun taking Levemir, says he will begin this evening before dinner. Specialist appointments since last outreach? no   If so, specialist and date: NA    Next PCP Appointment: around 9/30/22                                           Urology = 9/8/22    Medications:     New medications since last outreach: yes- Levemir (hasn't started yet). Patient was planning to take at bedtime. ACM explained that he is to take medication before dinner every day. Patient verbalized understanding and that he is planning to start insulin this evening. Does patient need refills on any medications: no   Medication changes since last outreach (dose adjustments or discontinued meds): States he was told he didn't need to take: Albuterol, Vit D3, Pepcid, Pravachol, Pericolace or Macrobid. Home Health company: NA    Barriers to care? medication management, understanding of disease process        Patient reminded that there are physicians on call 24 hours a day / 7 days a week (M-F 5pm to 8am and from Friday 5pm until Monday 8a for the weekend) should the patient have questions or concerns.       Goals        Demonstrate self-management skills      Patient will   check blood glucose at least daily  Decrease number of 'diet' sodas he drinks daily  Limit amount of Ensure he drinks daily  Monitor carbohydrate intake  Eat balanced diet  Fluid intake 6-8 glasses daily        7/21/22  Patient still is not checking BS

## 2022-09-07 ENCOUNTER — PATIENT OUTREACH (OUTPATIENT)
Dept: CASE MANAGEMENT | Age: 87
End: 2022-09-07

## 2022-09-07 NOTE — PROGRESS NOTES
Complex Case Management       Date/Time:  9/7/2022 2:53 PM     Attempted to reach patient by telephone. Left HIPPA compliant message requesting a return call. Will attempt to reach patient at a later time.

## 2022-09-09 ENCOUNTER — HOSPITAL ENCOUNTER (OUTPATIENT)
Dept: LAB | Age: 87
Discharge: HOME | End: 2022-09-09

## 2022-09-09 LAB — XX-LABCORP SPECIMEN COL,LCBCF: NORMAL

## 2022-09-09 PROCEDURE — 99001 SPECIMEN HANDLING PT-LAB: CPT

## 2022-09-12 ENCOUNTER — PATIENT OUTREACH (OUTPATIENT)
Dept: CASE MANAGEMENT | Age: 87
End: 2022-09-12

## 2022-09-12 NOTE — PROGRESS NOTES
Complex Case Management  Follow-Up       Date/Time:   9/12/22  12:17 PM       Ambulatory Care Manager ( ACM) contacted patient for Complex Case Management  follow up. Spoke to patient  Introduced self/role and reason for call. Patient reported: Attended urology appointment 9/8/22. States he is voiding 'well'. Denies blood in urine. Pertinent concerns:Patient has not checked BS in several days and can't remember the BS results from last time he checked. States he is not at home and therefore can't check BS ' right now'. ACM instructed patient to check BS as soon as he gets home and to call with results. Verbalized understanding. Specialist appointments since last outreach? yes   If so, specialist and date: Urology - 9/8/22    Next PCP Appointment: 10/3/00    Medications:     New medications since last outreach: no  Does patient need refills on any medications: no   Medication changes since last outreach (dose adjustments or discontinued meds): no      Home Health company: NA    Barriers to care? knowledge of disease process        Patient reminded that there are physicians on call 24 hours a day / 7 days a week (M-F 5pm to 8am and from Friday 5pm until Monday 8a for the weekend) should the patient have questions or concerns.        Goals        Demonstrate self-management skills      Patient will   check blood glucose at least daily  Decrease number of 'diet' sodas he drinks daily  Limit amount of Ensure he drinks daily  Monitor carbohydrate intake  Eat balanced diet  Fluid intake 6-8 glasses daily        7/21/22  Patient still is not checking BS

## 2022-09-28 ENCOUNTER — PATIENT OUTREACH (OUTPATIENT)
Dept: CASE MANAGEMENT | Age: 87
End: 2022-09-28

## 2022-09-28 NOTE — PROGRESS NOTES
Patient has graduated from the Complex Case Management  program on 9/28/22. Patient's symptoms are stable at this time. Patient/family has the ability to self-manage. Care management goals have been completed at this time. No further ACM follow up scheduled. Goals Addressed                   This Visit's Progress     COMPLETED: Demonstrate self-management skills        Patient will   check blood glucose at least daily  Decrease number of 'diet' sodas he drinks daily  Limit amount of Ensure he drinks daily  Monitor carbohydrate intake  Eat balanced diet  Fluid intake 6-8 glasses daily        7/21/22  Patient still is not checking BS    9/28/22  Patient has not been checking BS. Attempting to eat balanced, low sugar/low salt diet              Pt has ACM's contact information for any further questions, concerns, or needs. Patients upcoming visits:    Future Appointments   Date Time Provider Reina Duncan   10/3/2022 11:45 AM Robinson Huerta MD DMA BS AMB   9/13/2023  8:00 AM aKtie Lui MD [address] [place] [address] appointment 10/6/22 to have 2 teeth pulled.

## 2022-09-29 DIAGNOSIS — E11.65 POORLY CONTROLLED TYPE 2 DIABETES MELLITUS (HCC): Primary | ICD-10-CM

## 2022-09-29 RX ORDER — INSULIN PUMP SYRINGE, 3 ML
EACH MISCELLANEOUS
Qty: 1 KIT | Refills: 0 | Status: SHIPPED | OUTPATIENT
Start: 2022-09-29

## 2022-10-03 ENCOUNTER — OFFICE VISIT (OUTPATIENT)
Dept: FAMILY MEDICINE CLINIC | Age: 87
End: 2022-10-03
Payer: MEDICARE

## 2022-10-03 VITALS
DIASTOLIC BLOOD PRESSURE: 66 MMHG | HEIGHT: 66 IN | OXYGEN SATURATION: 97 % | HEART RATE: 80 BPM | BODY MASS INDEX: 18.23 KG/M2 | WEIGHT: 113.4 LBS | SYSTOLIC BLOOD PRESSURE: 114 MMHG | TEMPERATURE: 97.1 F | RESPIRATION RATE: 16 BRPM

## 2022-10-03 DIAGNOSIS — E78.00 HYPERCHOLESTEROLEMIA: ICD-10-CM

## 2022-10-03 DIAGNOSIS — E11.65 POORLY CONTROLLED TYPE 2 DIABETES MELLITUS (HCC): Primary | ICD-10-CM

## 2022-10-03 LAB — HBA1C MFR BLD HPLC: 12.8 %

## 2022-10-03 PROCEDURE — G8510 SCR DEP NEG, NO PLAN REQD: HCPCS | Performed by: FAMILY MEDICINE

## 2022-10-03 PROCEDURE — G8418 CALC BMI BLW LOW PARAM F/U: HCPCS | Performed by: FAMILY MEDICINE

## 2022-10-03 PROCEDURE — 99214 OFFICE O/P EST MOD 30 MIN: CPT | Performed by: FAMILY MEDICINE

## 2022-10-03 PROCEDURE — 3046F HEMOGLOBIN A1C LEVEL >9.0%: CPT | Performed by: FAMILY MEDICINE

## 2022-10-03 PROCEDURE — 1123F ACP DISCUSS/DSCN MKR DOCD: CPT | Performed by: FAMILY MEDICINE

## 2022-10-03 PROCEDURE — 83036 HEMOGLOBIN GLYCOSYLATED A1C: CPT | Performed by: FAMILY MEDICINE

## 2022-10-03 PROCEDURE — G8427 DOCREV CUR MEDS BY ELIG CLIN: HCPCS | Performed by: FAMILY MEDICINE

## 2022-10-03 PROCEDURE — G8536 NO DOC ELDER MAL SCRN: HCPCS | Performed by: FAMILY MEDICINE

## 2022-10-03 PROCEDURE — 1101F PT FALLS ASSESS-DOCD LE1/YR: CPT | Performed by: FAMILY MEDICINE

## 2022-10-03 NOTE — PROGRESS NOTES
Stanislav Vásquez is a 81 y.o. male (: 1935) presenting to address:    Chief Complaint   Patient presents with    Medication Refill     No flu shot  There were no vitals filed for this visit. Hearing/Vision:   No results found. Learning Assessment:     Learning Assessment 2019   PRIMARY LEARNER Patient   PRIMARY LANGUAGE ENGLISH   LEARNER PREFERENCE PRIMARY LISTENING   ANSWERED BY patient   RELATIONSHIP SELF     Depression Screening:     3 most recent PHQ Screens 10/3/2022   Little interest or pleasure in doing things Not at all   Feeling down, depressed, irritable, or hopeless Not at all   Total Score PHQ 2 0     Fall Risk Assessment:     Fall Risk Assessment, last 12 mths 10/3/2022   Able to walk? Yes   Fall in past 12 months? 0   Do you feel unsteady? 1   Are you worried about falling 0   Is TUG test greater than 12 seconds? 1   Is the gait abnormal? 0   Number of falls in past 12 months 0   Fall with injury? -     Abuse Screening:     Abuse Screening Questionnaire 2021   Do you ever feel afraid of your partner? N   Are you in a relationship with someone who physically or mentally threatens you? N   Is it safe for you to go home? Y     ADL Assessment:     ADL Assessment 2019   Feeding yourself No Help Needed   Getting from bed to chair No Help Needed   Getting dressed No Help Needed   Bathing or showering No Help Needed   Walk across the room (includes cane/walker) No Help Needed   Using the telphone No Help Needed   Taking your medications No Help Needed   Preparing meals No Help Needed   Managing money (expenses/bills) No Help Needed   Moderately strenuous housework (laundry) No Help Needed   Shopping for personal items (toiletries/medicines) No Help Needed   Shopping for groceries No Help Needed   Driving No Help Needed   Climbing a flight of stairs No Help Needed   Getting to places beyond walking distances No Help Needed        Coordination of Care Questionaire:     1.  \"Have you been to the ER, urgent care clinic since your last visit? Hospitalized since your last visit? \" No    2. \"Have you seen or consulted any other health care providers outside of the [address] [place] [address] since your last visit? \" No     3. For patients aged 39-70: Has the patient had a colonoscopy / FIT/ Cologuard? NA - based on age      If the patient is female:    4. For patients aged 41-77: Has the patient had a mammogram within the past 2 years? NA - based on age or sex      11. For patients aged 21-65: Has the patient had a pap smear?  NA - based on age or sex

## 2022-10-03 NOTE — PROGRESS NOTES
Stanislav Vásquez is a 81 y.o.  male and presents with    Chief Complaint   Patient presents with    Diabetes           Subjective:  Mr. Mazin Gurrola is following up for diabetes after restarting levemir at 30 units. Diabetes Mellitus:  He has diabetes mellitus, and  hyperlipidemia. Diabetic ROS - medication compliance: compliant most of the time, diabetic diet compliance: compliant all of the time. Lab review: orders written for new lab studies as appropriate; see orders. ROS   Constitutional: Negative for fever; positive night sweats; no weight changes  HENT: Negative for sore throat. Eyes: Negative for visual disturbance. Respiratory: No shortness of breath. Positive for cough and wheezing. Cardiovascular: Negative for chest pain. Positive for dyspnea on exertion  Gastrointestinal: positive for abdominal pain which is localized to right inguinal area which is intermittent. He has intermittent loose stool  Endocrine: positive for polyuria. Musculoskeletal: Negative for gait problem. Skin: Negative for rash. Allergic/Immunologic: Negative for immunocompromised state. Neurological: Negative for syncope. Psychiatric/Behavioral: Negative for sleep disturbance    All other systems reviewed and are negative. Objective:  Vitals:    10/03/22 1201   BP: 114/66   Pulse: 80   Resp: 16   Temp: 97.1 °F (36.2 °C)   TempSrc: Temporal   SpO2: 97%   Weight: 113 lb 6.4 oz (51.4 kg)   Height: 5' 6\" (1.676 m)       alert, well appearing, and in no distress, oriented to person, place, and time, and thin  Mental status - normal mood, behavior, speech, dress, motor activity, and thought processes  Neurological - cranial nerves II through XII intact    LABS   Hgb a1c 12.8  TESTS      Assessment/Plan:    1.  Poorly controlled type 2 diabetes mellitus (HCC)  Goal hgb a1c <7; not at goal but improved from 13.9 to 12.8 with levemir; instructed to increase levemir dose to 35 units  - AMB POC HEMOGLOBIN A1C    2. Hypercholesterolemia  Statin caused side effects      Lab review: labs reviewed, I note that glycosylated hemoglobin abnormal with slight improvement      I have discussed the diagnosis with the patient and the intended plan as seen in the above orders. The patient has received an after-visit summary and questions were answered concerning future plans. I have discussed medication side effects and warnings with the patient as well. I have reviewed the plan of care with the patient, accepted their input and they are in agreement with the treatment goals.

## 2023-04-04 ENCOUNTER — OFFICE VISIT (OUTPATIENT)
Facility: CLINIC | Age: 88
End: 2023-04-04
Payer: MEDICARE

## 2023-04-04 VITALS
HEART RATE: 86 BPM | DIASTOLIC BLOOD PRESSURE: 77 MMHG | OXYGEN SATURATION: 98 % | RESPIRATION RATE: 16 BRPM | BODY MASS INDEX: 17.19 KG/M2 | HEIGHT: 66 IN | WEIGHT: 107 LBS | TEMPERATURE: 98.7 F | SYSTOLIC BLOOD PRESSURE: 119 MMHG

## 2023-04-04 DIAGNOSIS — F33.41 RECURRENT MAJOR DEPRESSIVE DISORDER, IN PARTIAL REMISSION (HCC): ICD-10-CM

## 2023-04-04 DIAGNOSIS — Z79.4 TYPE 2 DIABETES MELLITUS WITH HYPERGLYCEMIA, WITH LONG-TERM CURRENT USE OF INSULIN (HCC): ICD-10-CM

## 2023-04-04 DIAGNOSIS — M65.342 TRIGGER FINGER, LEFT RING FINGER: Primary | ICD-10-CM

## 2023-04-04 DIAGNOSIS — E11.65 TYPE 2 DIABETES MELLITUS WITH HYPERGLYCEMIA, WITH LONG-TERM CURRENT USE OF INSULIN (HCC): ICD-10-CM

## 2023-04-04 DIAGNOSIS — N52.01 ERECTILE DYSFUNCTION DUE TO ARTERIAL INSUFFICIENCY: ICD-10-CM

## 2023-04-04 PROCEDURE — 1123F ACP DISCUSS/DSCN MKR DOCD: CPT | Performed by: FAMILY MEDICINE

## 2023-04-04 PROCEDURE — 99214 OFFICE O/P EST MOD 30 MIN: CPT | Performed by: FAMILY MEDICINE

## 2023-04-04 PROCEDURE — 1036F TOBACCO NON-USER: CPT | Performed by: FAMILY MEDICINE

## 2023-04-04 PROCEDURE — G8419 CALC BMI OUT NRM PARAM NOF/U: HCPCS | Performed by: FAMILY MEDICINE

## 2023-04-04 PROCEDURE — G8427 DOCREV CUR MEDS BY ELIG CLIN: HCPCS | Performed by: FAMILY MEDICINE

## 2023-04-04 RX ORDER — SILDENAFIL 100 MG/1
100 TABLET, FILM COATED ORAL PRN
Qty: 30 TABLET | Refills: 3 | Status: SHIPPED | OUTPATIENT
Start: 2023-04-04

## 2023-04-04 SDOH — ECONOMIC STABILITY: FOOD INSECURITY: WITHIN THE PAST 12 MONTHS, YOU WORRIED THAT YOUR FOOD WOULD RUN OUT BEFORE YOU GOT MONEY TO BUY MORE.: NEVER TRUE

## 2023-04-04 SDOH — ECONOMIC STABILITY: INCOME INSECURITY: HOW HARD IS IT FOR YOU TO PAY FOR THE VERY BASICS LIKE FOOD, HOUSING, MEDICAL CARE, AND HEATING?: NOT HARD AT ALL

## 2023-04-04 SDOH — ECONOMIC STABILITY: FOOD INSECURITY: WITHIN THE PAST 12 MONTHS, THE FOOD YOU BOUGHT JUST DIDN'T LAST AND YOU DIDN'T HAVE MONEY TO GET MORE.: NEVER TRUE

## 2023-04-04 SDOH — ECONOMIC STABILITY: HOUSING INSECURITY
IN THE LAST 12 MONTHS, WAS THERE A TIME WHEN YOU DID NOT HAVE A STEADY PLACE TO SLEEP OR SLEPT IN A SHELTER (INCLUDING NOW)?: NO

## 2023-04-04 ASSESSMENT — PATIENT HEALTH QUESTIONNAIRE - PHQ9
SUM OF ALL RESPONSES TO PHQ QUESTIONS 1-9: 0
2. FEELING DOWN, DEPRESSED OR HOPELESS: 0
SUM OF ALL RESPONSES TO PHQ QUESTIONS 1-9: 0
1. LITTLE INTEREST OR PLEASURE IN DOING THINGS: 0
SUM OF ALL RESPONSES TO PHQ9 QUESTIONS 1 & 2: 0

## 2023-04-04 NOTE — PROGRESS NOTES
Enio Dey presents today for   Chief Complaint   Patient presents with    Rash     Arms and legs    Diabetes    Benign Prostatic Hypertrophy     Med refills       Is someone accompanying this pt? No    Is the patient using any DME equipment during OV? Yes    Depression Screening:  No flowsheet data found. Learning Assessment:  No flowsheet data found. Abuse Screening:  No flowsheet data found. Fall Risk  No flowsheet data found. Health Maintenance reviewed and discussed and ordered per Provider. Health Maintenance Due   Topic Date Due    Pneumococcal 65+ years Vaccine (1 - PCV) Never done    DTaP/Tdap/Td vaccine (1 - Tdap) Never done    Shingles vaccine (1 of 2) Never done    COVID-19 Vaccine (3 - Booster for Pfizer series) 08/06/2021   .        1. \"Have you been to the ER, urgent care clinic since your last visit? Hospitalized since your last visit? \" No    2. \"Have you seen or consulted any other health care providers outside of the [address] [place] [address] since your last visit? \" No    3. For patients over 45: Has the patient had a colonoscopy? No     If the patient is female:    4. For patients over 36: Has the patient had a mammogram? NA - based on age or sex    11. For patients over 21: Has the patient had a pap smear?  NA - based on age or sex
Hgb a1c 12.8  TESTS      Assessment/Plan:    1. Trigger finger, left ring finger  Discussed corticosteroid injection but pt declined    2. Recurrent major depressive disorder, in partial remission (HCC)  Mood exacerbated by chronic illness    3. Type 2 diabetes mellitus with hyperglycemia, with long-term current use of insulin ([facility])  Goal hgb a1c <7; pt poorly compliant; discussed need to restart treatment; pt voiced understanding  - insulin detemir (LEVEMIR) 100 UNIT/ML injection pen; Inject 35 Units into the skin nightly  Dispense: 5 Adjustable Dose Pre-filled Pen Syringe; Refill: 5  - Hemoglobin A1C; Future       Lab review: orders written for new lab studies as appropriate; see orders      I have discussed the diagnosis with the patient and the intended plan as seen in the above orders. The patient has received an after-visit summary and questions were answered concerning future plans. I have discussed medication side effects and warnings with the patient as well. I have reviewed the plan of care with the patient, accepted their input and they are in agreement with the treatment goals.

## 2023-05-08 RX ORDER — EMPAGLIFLOZIN 25 MG/1
TABLET, FILM COATED ORAL
Qty: 90 TABLET | Refills: 3 | Status: SHIPPED | OUTPATIENT
Start: 2023-05-08

## 2023-07-25 ENCOUNTER — OFFICE VISIT (OUTPATIENT)
Facility: CLINIC | Age: 88
End: 2023-07-25
Payer: MEDICARE

## 2023-07-25 VITALS
TEMPERATURE: 97.2 F | HEIGHT: 66 IN | WEIGHT: 102.4 LBS | RESPIRATION RATE: 16 BRPM | OXYGEN SATURATION: 94 % | BODY MASS INDEX: 16.46 KG/M2 | HEART RATE: 88 BPM | SYSTOLIC BLOOD PRESSURE: 103 MMHG | DIASTOLIC BLOOD PRESSURE: 73 MMHG

## 2023-07-25 DIAGNOSIS — E11.65 TYPE 2 DIABETES MELLITUS WITH HYPERGLYCEMIA, WITH LONG-TERM CURRENT USE OF INSULIN (HCC): Primary | ICD-10-CM

## 2023-07-25 DIAGNOSIS — S32.020A COMPRESSION FRACTURE OF L2 VERTEBRA, INITIAL ENCOUNTER (HCC): ICD-10-CM

## 2023-07-25 DIAGNOSIS — Z79.4 TYPE 2 DIABETES MELLITUS WITH HYPERGLYCEMIA, WITH LONG-TERM CURRENT USE OF INSULIN (HCC): Primary | ICD-10-CM

## 2023-07-25 DIAGNOSIS — R62.7 FAILURE TO THRIVE IN ADULT: ICD-10-CM

## 2023-07-25 PROCEDURE — G8427 DOCREV CUR MEDS BY ELIG CLIN: HCPCS | Performed by: FAMILY MEDICINE

## 2023-07-25 PROCEDURE — 1036F TOBACCO NON-USER: CPT | Performed by: FAMILY MEDICINE

## 2023-07-25 PROCEDURE — G8419 CALC BMI OUT NRM PARAM NOF/U: HCPCS | Performed by: FAMILY MEDICINE

## 2023-07-25 PROCEDURE — 1123F ACP DISCUSS/DSCN MKR DOCD: CPT | Performed by: FAMILY MEDICINE

## 2023-07-25 PROCEDURE — 99214 OFFICE O/P EST MOD 30 MIN: CPT | Performed by: FAMILY MEDICINE

## 2023-07-25 NOTE — PROGRESS NOTES
Geeta Spaulding is a 81 y.o. presents today for No chief complaint on file. Depression Screening:   PHQ-9 Questionaire 4/4/2023 10/3/2022 8/30/2022 3/2/2022 1/18/2022 12/6/2021 11/24/2021   Little interest or pleasure in doing things 0 0 0 0 0 0 0   Feeling down, depressed, or hopeless 0 0 0 0 0 0 0   PHQ-9 Total Score 0 0 0 0 0 0 0       Abuse Screening:  No flowsheet data found. Learning Assessment:  No question data found. Fall Risk:  Fall Risk 4/4/2023   2 or more falls in past year? no   Fall with injury in past year? no           Coordination of Care:   1. \"Have you been to the ER, urgent care clinic since your last visit? Hospitalized since your last visit? \" no    2. \"Have you seen or consulted any other health care providers outside of the [address] [place] [address] since your last visit? \" no    3. For patients aged 43-73: Has the patient had a colonoscopy / FIT/ Cologuard? no    If the patient is female:    4. For patients aged 43-66: Has the patient had a mammogram within the past 2 years? no    5. For patients aged 21-65: Has the patient had a pap smear? no    Health Maintenance: reviewed and discussed and ordered per Provider.     Health Maintenance Due   Topic Date Due    Pneumococcal 65+ years Vaccine (1 - PCV) Never done    DTaP/Tdap/Td vaccine (1 - Tdap) Never done    Shingles vaccine (1 of 2) Never done    COVID-19 Vaccine (3 - Booster for Pfizer series) 08/06/2021

## 2023-07-30 ENCOUNTER — HOME HEALTH ADMISSION (OUTPATIENT)
Age: 88
End: 2023-07-30
Payer: MEDICARE

## 2023-07-31 ENCOUNTER — HOME CARE VISIT (OUTPATIENT)
Age: 88
End: 2023-07-31

## 2023-08-03 ENCOUNTER — HOME CARE VISIT (OUTPATIENT)
Age: 88
End: 2023-08-03

## 2023-08-05 ENCOUNTER — HOME CARE VISIT (OUTPATIENT)
Age: 88
End: 2023-08-05

## 2023-08-05 PROCEDURE — G0299 HHS/HOSPICE OF RN EA 15 MIN: HCPCS

## 2023-08-05 PROCEDURE — 0221000100 HH NO PAY CLAIM PROCEDURE

## 2023-08-05 NOTE — CASE COMMUNICATION
SOC completed on patient on 8/5/23. Patient ambulates easily with no DME. Not checking his blood sugars daily and encouraged to do so. All medications are in the home. Diabetic workbook given to patient.    Thank you

## 2023-08-07 ENCOUNTER — HOME CARE VISIT (OUTPATIENT)
Age: 88
End: 2023-08-07

## 2023-08-07 VITALS
OXYGEN SATURATION: 98 % | DIASTOLIC BLOOD PRESSURE: 70 MMHG | SYSTOLIC BLOOD PRESSURE: 118 MMHG | RESPIRATION RATE: 14 BRPM | HEART RATE: 72 BPM | TEMPERATURE: 97.4 F

## 2023-08-07 ASSESSMENT — ENCOUNTER SYMPTOMS
DYSPNEA ACTIVITY LEVEL: AFTER AMBULATING 10 - 20 FT
HEMOPTYSIS: 0

## 2023-08-07 NOTE — HOME HEALTH
Skilled services/Home bound verification: Patient understands the meaning of homebound status. Skilled Reason for admission/summary of clinical condition: : Type 2 diabetes mellitus with hyperglycemia    This patient is homebound for the following reasons Requires considerable and taxing effort to leave the home , Requires the assistance of 1 or more persons to leave the home  and Only leaves the home for medical reasons or Christian services and are infrequent and of short duration for other reasons . Caregiver: relative. It is Huey Postin she is available most days and some evenings. Caregiver assists with . Yisel Little Assists with ADLs, Medication management, Transportation to appointments, Meal prep and assists with ambulation. Medications reconciled and all medications are available in the home this visit. The following education was provided regarding medications: All medications should be given the same time daily. Medications  are somewhat effective at this time. High risk medication teaching regarding hyperglycemic agents,  performed. Patient is on Jardiance, We discussed the importance of taking hypoglycemics as ordered, knowing the signs of hypoglycemia and monitoring Blood sugars periodically. He also is on Levemir I went over the way this long acting is used and when it peaks, I went over the signs and symptoms of hyper/hypoglycemia and the importance of rotating injections. I also stressed the importance of using a sharps container for used needles and discard with top tightly closed. Instruct Patient in insulin purpose, side effects, possible complications, administration procedure, and schedule. Instruct on safe handling and storage of insulin and on safe disposal of needles and testing equipment.     Home health supplies by type and quantity ordered/delivered this visit include:n/a    Patient education provided this visit to include: Patient is not checking blood sugars

## 2023-08-11 ENCOUNTER — HOME CARE VISIT (OUTPATIENT)
Age: 88
End: 2023-08-11

## 2023-08-11 VITALS
RESPIRATION RATE: 16 BRPM | SYSTOLIC BLOOD PRESSURE: 106 MMHG | DIASTOLIC BLOOD PRESSURE: 62 MMHG | OXYGEN SATURATION: 97 % | TEMPERATURE: 97.1 F | HEART RATE: 78 BPM

## 2023-08-11 PROCEDURE — G0300 HHS/HOSPICE OF LPN EA 15 MIN: HCPCS

## 2023-08-11 ASSESSMENT — ENCOUNTER SYMPTOMS: STOOL DESCRIPTION: SOFT FORMED

## 2023-08-11 NOTE — HOME HEALTH
being able to better manage her disease processes with the education received from home care staff. Patient's Progress towards personal goals:  Patient is progressing slowly and doing better in managing disease processes. Home exercise program: Patient will be sure to monitor his blood sugar at before meals and at bedtime and record readings on a daily log for review by home care nurse. Patient has verbalized understanding. Continued need for the following skills: Nursing will continue to follow patient until education is understood and able to be verbalized by patient/caregiver. Plan for next visit: Continue to educate, assess weight, assess vitals, and review medications    Patient and/or caregiver notified and agrees to changes in the Plan of Care: NA    The following discharge planning was discussed with the pt/caregiver:  when patient reaches goals and medication is managed, and disease processes are understood patient agrees and understand that discharge will take place.

## 2023-08-15 ENCOUNTER — HOME CARE VISIT (OUTPATIENT)
Age: 88
End: 2023-08-15
Payer: MEDICARE

## 2023-08-15 VITALS
RESPIRATION RATE: 16 BRPM | OXYGEN SATURATION: 99 % | HEART RATE: 88 BPM | TEMPERATURE: 97.8 F | SYSTOLIC BLOOD PRESSURE: 128 MMHG | DIASTOLIC BLOOD PRESSURE: 68 MMHG

## 2023-08-15 PROCEDURE — G0300 HHS/HOSPICE OF LPN EA 15 MIN: HCPCS

## 2023-08-15 ASSESSMENT — ENCOUNTER SYMPTOMS: STOOL DESCRIPTION: SOFT FORMED

## 2023-08-18 ENCOUNTER — HOME CARE VISIT (OUTPATIENT)
Age: 88
End: 2023-08-18
Payer: MEDICARE

## 2023-08-18 ENCOUNTER — TELEPHONE (OUTPATIENT)
Facility: CLINIC | Age: 88
End: 2023-08-18

## 2023-08-18 VITALS
OXYGEN SATURATION: 97 % | HEART RATE: 56 BPM | TEMPERATURE: 97.8 F | RESPIRATION RATE: 16 BRPM | DIASTOLIC BLOOD PRESSURE: 62 MMHG | SYSTOLIC BLOOD PRESSURE: 124 MMHG

## 2023-08-18 PROCEDURE — G0300 HHS/HOSPICE OF LPN EA 15 MIN: HCPCS

## 2023-08-18 ASSESSMENT — ENCOUNTER SYMPTOMS: STOOL DESCRIPTION: SOFT FORMED

## 2023-08-18 NOTE — TELEPHONE ENCOUNTER
Keri Hartwick nurse, is requesting a return call from Dr. Margy Crawley regarding patient. She states he went to the ED 2 days in a row now for his blood sugar. The ED adjusted the Levemir. Patient's blood sugar was 29. Please call as soon as possible. Thank you.

## 2023-08-22 ENCOUNTER — HOME CARE VISIT (OUTPATIENT)
Age: 88
End: 2023-08-22
Payer: MEDICARE

## 2023-08-22 VITALS
OXYGEN SATURATION: 97 % | DIASTOLIC BLOOD PRESSURE: 52 MMHG | RESPIRATION RATE: 16 BRPM | TEMPERATURE: 97.6 F | HEART RATE: 62 BPM | SYSTOLIC BLOOD PRESSURE: 104 MMHG

## 2023-08-22 PROCEDURE — G0300 HHS/HOSPICE OF LPN EA 15 MIN: HCPCS

## 2023-08-22 ASSESSMENT — ENCOUNTER SYMPTOMS: STOOL DESCRIPTION: SOFT FORMED

## 2023-08-23 ENCOUNTER — CARE COORDINATION (OUTPATIENT)
Facility: CLINIC | Age: 88
End: 2023-08-23

## 2023-08-23 ASSESSMENT — PATIENT HEALTH QUESTIONNAIRE - PHQ9
SUM OF ALL RESPONSES TO PHQ QUESTIONS 1-9: 0

## 2023-08-23 NOTE — CARE COORDINATION
.Ambulatory Care Coordination Note  2023    Patient Current Location:  Home: [address] [place] [address] Dr Niraj Puente   [address] [facility] [id number]    ACM contacted the patient by telephone. Verified name and  with patient as identifiers. Provided introduction to self, and explanation of the ACM role. ACM: Ml Doe RN    Challenges to be reviewed by the provider   Additional needs identified to be addressed with provider: Yes  Patient accepted complex case management. ACM will be following patient. Reinforcing diabetic education>> Meds, meals,trying to Exercise>> Monitor weight loss. Will ask patient to add Glucerna between meals to help with weight loss. Method of communication with provider: chart routing. Offered patient enrollment in the Remote Patient Monitoring (RPM) program for in-home monitoring: Patient is not eligible for RPM program.    Ambulatory Care Coordination Assessment    Care Coordination Protocol  Referral from Primary Care Provider: No  Week 1 - Initial Assessment     Do you have all of your prescriptions and are they filled?: Yes  Barriers to medication adherence: Forgets to take  Are you able to afford your medications?: Yes  How often do you have trouble taking your medications the way you have been told to take them?: Sometimes I take them as prescribed. Do you have Home O2 Therapy?: No      Ability to seek help/take action for Emergent Urgent situations i.e. fire, crime, inclement weather or health crisis. : Independent  Ability to ambulate to restroom: Independent  Ability handle personal hygeine needs (bathing/dressing/grooming):  Independent  Ability to manage Medications: Needs Assistance  Ability to prepare Food Preparation: Independent  Ability to maintain home (clean home, laundry): Needs Assistance  Ability to drive and/or has transportation: Needs Assistance  Ability to do shopping: Needs Assistance  Ability to manage finances: Needs Assistance  Is patient

## 2023-08-25 ENCOUNTER — OFFICE VISIT (OUTPATIENT)
Facility: CLINIC | Age: 88
End: 2023-08-25
Payer: MEDICARE

## 2023-08-25 VITALS
TEMPERATURE: 97.3 F | DIASTOLIC BLOOD PRESSURE: 63 MMHG | HEART RATE: 63 BPM | HEIGHT: 66 IN | OXYGEN SATURATION: 100 % | BODY MASS INDEX: 17.19 KG/M2 | WEIGHT: 107 LBS | RESPIRATION RATE: 16 BRPM | SYSTOLIC BLOOD PRESSURE: 120 MMHG

## 2023-08-25 DIAGNOSIS — E11.649 TYPE 2 DIABETES MELLITUS WITH HYPOGLYCEMIA WITHOUT COMA, WITH LONG-TERM CURRENT USE OF INSULIN (HCC): ICD-10-CM

## 2023-08-25 DIAGNOSIS — Z71.89 ADVANCE CARE PLANNING: ICD-10-CM

## 2023-08-25 DIAGNOSIS — Z79.4 TYPE 2 DIABETES MELLITUS WITH HYPOGLYCEMIA WITHOUT COMA, WITH LONG-TERM CURRENT USE OF INSULIN (HCC): ICD-10-CM

## 2023-08-25 DIAGNOSIS — E78.00 PURE HYPERCHOLESTEROLEMIA, UNSPECIFIED: ICD-10-CM

## 2023-08-25 DIAGNOSIS — Z00.00 MEDICARE ANNUAL WELLNESS VISIT, SUBSEQUENT: Primary | ICD-10-CM

## 2023-08-25 DIAGNOSIS — F33.41 RECURRENT MAJOR DEPRESSIVE DISORDER, IN PARTIAL REMISSION (HCC): ICD-10-CM

## 2023-08-25 DIAGNOSIS — Z12.5 SCREENING FOR MALIGNANT NEOPLASM OF PROSTATE: ICD-10-CM

## 2023-08-25 DIAGNOSIS — S32.020D COMPRESSION FRACTURE OF L2 VERTEBRA WITH ROUTINE HEALING, SUBSEQUENT ENCOUNTER: ICD-10-CM

## 2023-08-25 PROCEDURE — 99214 OFFICE O/P EST MOD 30 MIN: CPT | Performed by: FAMILY MEDICINE

## 2023-08-25 PROCEDURE — G8427 DOCREV CUR MEDS BY ELIG CLIN: HCPCS | Performed by: FAMILY MEDICINE

## 2023-08-25 PROCEDURE — G8419 CALC BMI OUT NRM PARAM NOF/U: HCPCS | Performed by: FAMILY MEDICINE

## 2023-08-25 PROCEDURE — G0439 PPPS, SUBSEQ VISIT: HCPCS | Performed by: FAMILY MEDICINE

## 2023-08-25 PROCEDURE — 99497 ADVNCD CARE PLAN 30 MIN: CPT | Performed by: FAMILY MEDICINE

## 2023-08-25 PROCEDURE — 1036F TOBACCO NON-USER: CPT | Performed by: FAMILY MEDICINE

## 2023-08-25 RX ORDER — GLUCAGON INJECTION, SOLUTION 0.5 MG/.1ML
1 INJECTION, SOLUTION SUBCUTANEOUS PRN
Qty: 1 EACH | Refills: 0 | Status: SHIPPED | OUTPATIENT
Start: 2023-08-25

## 2023-08-25 ASSESSMENT — PATIENT HEALTH QUESTIONNAIRE - PHQ9
SUM OF ALL RESPONSES TO PHQ9 QUESTIONS 1 & 2: 0
5. POOR APPETITE OR OVEREATING: 0
8. MOVING OR SPEAKING SO SLOWLY THAT OTHER PEOPLE COULD HAVE NOTICED. OR THE OPPOSITE, BEING SO FIGETY OR RESTLESS THAT YOU HAVE BEEN MOVING AROUND A LOT MORE THAN USUAL: 0
2. FEELING DOWN, DEPRESSED OR HOPELESS: 0
9. THOUGHTS THAT YOU WOULD BE BETTER OFF DEAD, OR OF HURTING YOURSELF: 0
6. FEELING BAD ABOUT YOURSELF - OR THAT YOU ARE A FAILURE OR HAVE LET YOURSELF OR YOUR FAMILY DOWN: 0
4. FEELING TIRED OR HAVING LITTLE ENERGY: 0
SUM OF ALL RESPONSES TO PHQ QUESTIONS 1-9: 0
SUM OF ALL RESPONSES TO PHQ QUESTIONS 1-9: 0
1. LITTLE INTEREST OR PLEASURE IN DOING THINGS: 0
SUM OF ALL RESPONSES TO PHQ QUESTIONS 1-9: 0
10. IF YOU CHECKED OFF ANY PROBLEMS, HOW DIFFICULT HAVE THESE PROBLEMS MADE IT FOR YOU TO DO YOUR WORK, TAKE CARE OF THINGS AT HOME, OR GET ALONG WITH OTHER PEOPLE: 0
SUM OF ALL RESPONSES TO PHQ QUESTIONS 1-9: 0
7. TROUBLE CONCENTRATING ON THINGS, SUCH AS READING THE NEWSPAPER OR WATCHING TELEVISION: 0
3. TROUBLE FALLING OR STAYING ASLEEP: 0

## 2023-08-25 ASSESSMENT — LIFESTYLE VARIABLES
HOW MANY STANDARD DRINKS CONTAINING ALCOHOL DO YOU HAVE ON A TYPICAL DAY: 1 OR 2
HOW OFTEN DO YOU HAVE A DRINK CONTAINING ALCOHOL: 2-4 TIMES A MONTH

## 2023-08-25 ASSESSMENT — VISUAL ACUITY
OD_CC: 20/40
OS_CC: 20/15

## 2023-08-25 NOTE — PROGRESS NOTES
Dispense: 90 tablet; Refill: 3    4. Screening for malignant neoplasm of prostate    - PSA Screening; Future    5. Compression fracture of L2 vertebra, subsequent encounter ([address])  Follow up as scheduled    6. Recurrent major depressive disorder, in partial remission (HCC)  Mood improved    7. Pure hypercholesterolemia, unspecified         Lab review: orders written for new lab studies as appropriate; see orders      I have discussed the diagnosis with the patient and the intended plan as seen in the above orders. The patient has received an after-visit summary and questions were answered concerning future plans. I have discussed medication side effects and warnings with the patient as well. I have reviewed the plan of care with the patient, accepted their input and they are in agreement with the treatment goals. Medicare Annual Wellness Visit    Rosalee Adams is here for Medicare AWV and Medication Adjustment    Assessment & Plan   Medicare annual wellness visit, subsequent  Advance care planning  Type 2 diabetes mellitus with hypoglycemia without coma, with long-term current use of insulin (HCC)  -     Glucagon (GVOKE HYPOPEN 1-PACK) 0.5 MG/0.1ML SOAJ; Inject 1 pen  into the skin as needed (hypoglycemia), Disp-1 each, R-0Normal  -     empagliflozin (JARDIANCE) 25 MG tablet; Take 1 tablet by mouth daily, Disp-90 tablet, R-3Normal  Screening for malignant neoplasm of prostate  -     PSA Screening; Future  Compression fracture of L2 vertebra, initial encounter ([facility])  Recurrent major depressive disorder, in partial remission ([address])  Pure hypercholesterolemia, unspecified    Recommendations for Preventive Services Due: see orders and patient instructions/AVS.  Recommended screening schedule for the next 5-10 years is provided to the patient in written form: see Patient Instructions/AVS.     No follow-ups on file.      Subjective   Weight loss with poorly controlled diabetes    Patient's complete Health Risk

## 2023-08-29 ENCOUNTER — HOME CARE VISIT (OUTPATIENT)
Age: 88
End: 2023-08-29
Payer: MEDICARE

## 2023-08-29 VITALS
RESPIRATION RATE: 16 BRPM | SYSTOLIC BLOOD PRESSURE: 112 MMHG | HEART RATE: 74 BPM | OXYGEN SATURATION: 98 % | TEMPERATURE: 97.5 F | DIASTOLIC BLOOD PRESSURE: 62 MMHG

## 2023-08-29 PROCEDURE — G0300 HHS/HOSPICE OF LPN EA 15 MIN: HCPCS

## 2023-08-29 ASSESSMENT — ENCOUNTER SYMPTOMS: STOOL DESCRIPTION: SOFT FORMED

## 2023-09-06 ENCOUNTER — HOME CARE VISIT (OUTPATIENT)
Age: 88
End: 2023-09-06
Payer: MEDICARE

## 2023-09-06 VITALS
SYSTOLIC BLOOD PRESSURE: 104 MMHG | HEART RATE: 74 BPM | DIASTOLIC BLOOD PRESSURE: 66 MMHG | OXYGEN SATURATION: 98 % | TEMPERATURE: 97.9 F | RESPIRATION RATE: 16 BRPM

## 2023-09-06 PROCEDURE — G0300 HHS/HOSPICE OF LPN EA 15 MIN: HCPCS

## 2023-09-06 ASSESSMENT — ENCOUNTER SYMPTOMS: STOOL DESCRIPTION: SOFT FORMED

## 2023-09-14 ENCOUNTER — HOME CARE VISIT (OUTPATIENT)
Age: 88
End: 2023-09-14
Payer: MEDICARE

## 2023-09-14 PROCEDURE — G0299 HHS/HOSPICE OF RN EA 15 MIN: HCPCS

## 2023-09-16 VITALS
TEMPERATURE: 98.7 F | HEART RATE: 67 BPM | DIASTOLIC BLOOD PRESSURE: 70 MMHG | OXYGEN SATURATION: 99 % | RESPIRATION RATE: 16 BRPM | SYSTOLIC BLOOD PRESSURE: 120 MMHG

## 2023-09-29 RX ORDER — CALCIUM CITRATE/VITAMIN D3 200MG-6.25
TABLET ORAL
Qty: 50 STRIP | Refills: 5 | Status: SHIPPED | OUTPATIENT
Start: 2023-09-29

## 2023-10-27 ENCOUNTER — OFFICE VISIT (OUTPATIENT)
Facility: CLINIC | Age: 88
End: 2023-10-27
Payer: MEDICARE

## 2023-10-27 VITALS
DIASTOLIC BLOOD PRESSURE: 65 MMHG | BODY MASS INDEX: 15.64 KG/M2 | HEIGHT: 66 IN | WEIGHT: 97.3 LBS | OXYGEN SATURATION: 95 % | TEMPERATURE: 97.3 F | SYSTOLIC BLOOD PRESSURE: 101 MMHG | RESPIRATION RATE: 18 BRPM | HEART RATE: 78 BPM

## 2023-10-27 DIAGNOSIS — Z79.4 TYPE 2 DIABETES MELLITUS WITH HYPERGLYCEMIA, WITH LONG-TERM CURRENT USE OF INSULIN (HCC): Primary | ICD-10-CM

## 2023-10-27 DIAGNOSIS — Z79.4 DIABETES MELLITUS DUE TO UNDERLYING CONDITION WITH DIABETIC NEUROPATHY, WITH LONG-TERM CURRENT USE OF INSULIN (HCC): ICD-10-CM

## 2023-10-27 DIAGNOSIS — R63.4 WEIGHT LOSS: ICD-10-CM

## 2023-10-27 DIAGNOSIS — E11.65 TYPE 2 DIABETES MELLITUS WITH HYPERGLYCEMIA, WITH LONG-TERM CURRENT USE OF INSULIN (HCC): Primary | ICD-10-CM

## 2023-10-27 DIAGNOSIS — Z28.21 REFUSED INFLUENZA VACCINE: ICD-10-CM

## 2023-10-27 DIAGNOSIS — E08.40 DIABETES MELLITUS DUE TO UNDERLYING CONDITION WITH DIABETIC NEUROPATHY, WITH LONG-TERM CURRENT USE OF INSULIN (HCC): ICD-10-CM

## 2023-10-27 LAB — HBA1C MFR BLD: 9.1 %

## 2023-10-27 PROCEDURE — G8484 FLU IMMUNIZE NO ADMIN: HCPCS | Performed by: FAMILY MEDICINE

## 2023-10-27 PROCEDURE — 99214 OFFICE O/P EST MOD 30 MIN: CPT | Performed by: FAMILY MEDICINE

## 2023-10-27 PROCEDURE — 1036F TOBACCO NON-USER: CPT | Performed by: FAMILY MEDICINE

## 2023-10-27 PROCEDURE — 1123F ACP DISCUSS/DSCN MKR DOCD: CPT | Performed by: FAMILY MEDICINE

## 2023-10-27 PROCEDURE — 83036 HEMOGLOBIN GLYCOSYLATED A1C: CPT | Performed by: FAMILY MEDICINE

## 2023-10-27 PROCEDURE — G8419 CALC BMI OUT NRM PARAM NOF/U: HCPCS | Performed by: FAMILY MEDICINE

## 2023-10-27 PROCEDURE — G8428 CUR MEDS NOT DOCUMENT: HCPCS | Performed by: FAMILY MEDICINE

## 2023-10-27 RX ORDER — PREGABALIN 75 MG/1
75 CAPSULE ORAL 2 TIMES DAILY
Qty: 60 CAPSULE | Refills: 5 | Status: SHIPPED | OUTPATIENT
Start: 2023-10-27 | End: 2024-04-24

## 2023-10-27 ASSESSMENT — ENCOUNTER SYMPTOMS
WHEEZING: 0
COUGH: 0
VOMITING: 0
SHORTNESS OF BREATH: 0
CONSTIPATION: 0
NAUSEA: 0
ABDOMINAL PAIN: 0

## 2023-10-27 NOTE — PROGRESS NOTES
Millie Joseph is a 81 y.o. presents today for   Chief Complaint   Patient presents with    Other     DM F/U       Is someone accompanying this pt? NO    Is the patient using any DME equipment during OV? NO    Depression Screenin/25/2023     8:55 AM 2023     1:49 PM 2023     2:45 PM 10/3/2022    11:53 AM 2022    11:57 AM 3/2/2022     3:29 PM 2022     8:30 AM   PHQ-9 Questionaire   Little interest or pleasure in doing things 0 0 0 0 0 0 0   Feeling down, depressed, or hopeless 0 0 0 0 0 0 0   Trouble falling or staying asleep, or sleeping too much 0         Feeling tired or having little energy 0         Poor appetite or overeating 0         Feeling bad about yourself - or that you are a failure or have let yourself or your family down 0         Trouble concentrating on things, such as reading the newspaper or watching television 0         Moving or speaking so slowly that other people could have noticed. Or the opposite - being so fidgety or restless that you have been moving around a lot more than usual 0         Thoughts that you would be better off dead, or of hurting yourself in some way 0         PHQ-9 Total Score 0 0 0 0 0 0 0   If you checked off any problems, how difficult have these problems made it for you to do your work, take care of things at home, or get along with other people? 0             Abuse Screening:       No data to display                Learning Assessment:  No question data found. Fall Risk:      2023     8:55 AM 2023     1:39 PM 2023     2:45 PM   Fall Risk   2 or more falls in past year? no no no   Fall with injury in past year? no no no           Coordination of Care:   1. \"Have you been to the ER, urgent care clinic since your last visit? Hospitalized since your last visit? \" NO    2. \"Have you seen or consulted any other health care providers outside of the [address] [place] [address] since your last visit? \" NO    3.  For patients aged

## 2023-10-27 NOTE — PROGRESS NOTES
Tony Tejada  81 y. o.male  10/27/23  8:48 AM      HPI:  Mr. Mgagi Toussaint is following up on his diabetes. He is taking 15 units at night at home as well as 25mg Jardiance once per day. He says that his sugars are averaging 150-190 at home. He also endorses continued weight loss and pins and needle pain in his feet. Diabetes Mellitus:  He has diabetes mellitus, and  hyperlipidemia. Diabetic ROS - medication compliance: compliant most of the time, diabetic diet compliance: compliant all of the time. Lab review: orders written for new lab studies as appropriate; see orders. He has polyuria    PMH:  Past Medical History:   Diagnosis Date    Abdominal pain     Acute cystitis with hematuria     Bladder mass     Bladder pain     BPH (benign prostatic hyperplasia)     BPH with obstruction/lower urinary tract symptoms     Chronic prostatitis     Diabetes (HCC)     ED (erectile dysfunction)     Elevated PSA     Essential hypertension     Hematuria     History of UTI     Hypercholesterolemia     Nocturia     Urinary tract infection, site not specified     Urine leukocytes            MEDS:  Current Outpatient Medications   Medication Sig Dispense Refill    pregabalin (LYRICA) 75 MG capsule Take 1 capsule by mouth 2 times daily for 180 days.  Max Daily Amount: 150 mg 60 capsule 5    insulin detemir (LEVEMIR) 100 UNIT/ML injection pen Inject 20 Units into the skin nightly 5 Adjustable Dose Pre-filled Pen Syringe 5    TRUE METRIX BLOOD GLUCOSE TEST strip use 1 TEST STRIP to TEST BLOOD SUGAR three times a day 50 strip 5    Glucagon (GVOKE HYPOPEN 1-PACK) 0.5 MG/0.1ML SOAJ Inject 1 pen  into the skin as needed (hypoglycemia) 1 each 0    empagliflozin (JARDIANCE) 25 MG tablet Take 1 tablet by mouth daily 90 tablet 3    sildenafil (VIAGRA) 100 MG tablet Take 1 tablet by mouth as needed for Erectile Dysfunction 30 tablet 3    albuterol sulfate HFA (PROVENTIL;VENTOLIN;PROAIR) 108 (90 Base) MCG/ACT inhaler Inhale 1 puff into the lungs

## 2023-12-12 ENCOUNTER — OFFICE VISIT (OUTPATIENT)
Facility: CLINIC | Age: 88
End: 2023-12-12
Payer: MEDICARE

## 2023-12-12 VITALS
SYSTOLIC BLOOD PRESSURE: 100 MMHG | BODY MASS INDEX: 16.84 KG/M2 | TEMPERATURE: 98.5 F | OXYGEN SATURATION: 99 % | HEART RATE: 70 BPM | WEIGHT: 104.8 LBS | HEIGHT: 66 IN | RESPIRATION RATE: 16 BRPM | DIASTOLIC BLOOD PRESSURE: 68 MMHG

## 2023-12-12 DIAGNOSIS — E11.65 TYPE 2 DIABETES MELLITUS WITH HYPERGLYCEMIA, WITH LONG-TERM CURRENT USE OF INSULIN (HCC): Primary | ICD-10-CM

## 2023-12-12 DIAGNOSIS — J43.1 PANLOBULAR EMPHYSEMA (HCC): ICD-10-CM

## 2023-12-12 DIAGNOSIS — Z79.4 TYPE 2 DIABETES MELLITUS WITH HYPERGLYCEMIA, WITH LONG-TERM CURRENT USE OF INSULIN (HCC): Primary | ICD-10-CM

## 2023-12-12 LAB — HBA1C MFR BLD: 11.8 %

## 2023-12-12 PROCEDURE — 99214 OFFICE O/P EST MOD 30 MIN: CPT | Performed by: FAMILY MEDICINE

## 2023-12-12 PROCEDURE — G8419 CALC BMI OUT NRM PARAM NOF/U: HCPCS | Performed by: FAMILY MEDICINE

## 2023-12-12 PROCEDURE — 3023F SPIROM DOC REV: CPT | Performed by: FAMILY MEDICINE

## 2023-12-12 PROCEDURE — 1036F TOBACCO NON-USER: CPT | Performed by: FAMILY MEDICINE

## 2023-12-12 PROCEDURE — G8484 FLU IMMUNIZE NO ADMIN: HCPCS | Performed by: FAMILY MEDICINE

## 2023-12-12 PROCEDURE — 1123F ACP DISCUSS/DSCN MKR DOCD: CPT | Performed by: FAMILY MEDICINE

## 2023-12-12 PROCEDURE — G8427 DOCREV CUR MEDS BY ELIG CLIN: HCPCS | Performed by: FAMILY MEDICINE

## 2023-12-12 PROCEDURE — 83036 HEMOGLOBIN GLYCOSYLATED A1C: CPT | Performed by: FAMILY MEDICINE

## 2023-12-12 RX ORDER — FLUTICASONE FUROATE, UMECLIDINIUM BROMIDE AND VILANTEROL TRIFENATATE 100; 62.5; 25 UG/1; UG/1; UG/1
1 POWDER RESPIRATORY (INHALATION) DAILY
Qty: 1 EACH | Refills: 2 | Status: SHIPPED | OUTPATIENT
Start: 2023-12-12

## 2023-12-12 ASSESSMENT — ENCOUNTER SYMPTOMS
VOMITING: 0
COUGH: 0
SHORTNESS OF BREATH: 0
NAUSEA: 0
CONSTIPATION: 0
SINUS PAIN: 0
DIARRHEA: 0
SINUS PRESSURE: 0
ABDOMINAL PAIN: 0
SORE THROAT: 0

## 2023-12-12 NOTE — PROGRESS NOTES
Millie Joseph is a 81 y.o. presents today for   Chief Complaint   Patient presents with    Shortness of Breath         Depression Screenin/25/2023     8:55 AM 2023     1:49 PM 2023     2:45 PM 10/3/2022    11:53 AM 2022    11:57 AM 3/2/2022     3:29 PM 2022     8:30 AM   PHQ-9 Questionaire   Little interest or pleasure in doing things 0 0 0 0 0 0 0   Feeling down, depressed, or hopeless 0 0 0 0 0 0 0   Trouble falling or staying asleep, or sleeping too much 0         Feeling tired or having little energy 0         Poor appetite or overeating 0         Feeling bad about yourself - or that you are a failure or have let yourself or your family down 0         Trouble concentrating on things, such as reading the newspaper or watching television 0         Moving or speaking so slowly that other people could have noticed. Or the opposite - being so fidgety or restless that you have been moving around a lot more than usual 0         Thoughts that you would be better off dead, or of hurting yourself in some way 0         PHQ-9 Total Score 0 0 0 0 0 0 0   If you checked off any problems, how difficult have these problems made it for you to do your work, take care of things at home, or get along with other people? 0             Abuse Screening:       No data to display                Learning Assessment:  No question data found. Fall Risk:      2023     8:55 AM 2023     1:39 PM 2023     2:45 PM   Fall Risk   2 or more falls in past year? no no no   Fall with injury in past year? no no no           Coordination of Care:   1. \"Have you been to the ER, urgent care clinic since your last visit? Hospitalized since your last visit? \" no    2. \"Have you seen or consulted any other health care providers outside of the [address] [place] [address] since your last visit? \" no    3.  For patients aged 43-73: Has the patient had a colonoscopy / FIT/ Cologuard? yes    If the patient is

## 2023-12-14 ENCOUNTER — PHARMACY VISIT (OUTPATIENT)
Facility: CLINIC | Age: 88
End: 2023-12-14

## 2023-12-14 DIAGNOSIS — Z79.4 TYPE 2 DIABETES MELLITUS WITH HYPERGLYCEMIA, WITH LONG-TERM CURRENT USE OF INSULIN (HCC): ICD-10-CM

## 2023-12-14 DIAGNOSIS — E11.65 TYPE 2 DIABETES MELLITUS WITH HYPERGLYCEMIA, WITH LONG-TERM CURRENT USE OF INSULIN (HCC): ICD-10-CM

## 2023-12-14 NOTE — PROGRESS NOTES
(date was Aug/Sept/Oct 2023): 172, 287, 267, 240, 195, 50, 182, 52, 141, 130, 59, 69, 61, 68, 71, 86, 84, 79    Drug interactions:   No significant drug-drug interactions with diabetes medication expected according to the literature     Medication Adherence/Access:  - Endorses adherence to current regimen?: No pt is not taking insulin as directed    Complete current medication regimen includes:  Current Outpatient Medications   Medication Sig    fluticasone-umeclidin-vilant (TRELEGY ELLIPTA) 100-62.5-25 MCG/ACT AEPB inhaler Inhale 1 puff into the lungs daily    pregabalin (LYRICA) 75 MG capsule Take 1 capsule by mouth 2 times daily for 180 days. Max Daily Amount: 150 mg    insulin detemir (LEVEMIR) 100 UNIT/ML injection pen Inject 20 Units into the skin nightly    TRUE METRIX BLOOD GLUCOSE TEST strip use 1 TEST STRIP to TEST BLOOD SUGAR three times a day    Glucagon (GVOKE HYPOPEN 1-PACK) 0.5 MG/0.1ML SOAJ Inject 1 pen  into the skin as needed (hypoglycemia)    empagliflozin (JARDIANCE) 25 MG tablet Take 1 tablet by mouth daily    sildenafil (VIAGRA) 100 MG tablet Take 1 tablet by mouth as needed for Erectile Dysfunction    albuterol sulfate HFA (PROVENTIL;VENTOLIN;PROAIR) 108 (90 Base) MCG/ACT inhaler Inhale 1 puff into the lungs every 6 hours as needed for Shortness of Breath or Wheezing     No current facility-administered medications for this visit. Allergies:   Allergies   Allergen Reactions    Sulfamethoxazole-Trimethoprim Other (See Comments)     Patient reports sweat, faint and unconscious for several hours when came to he had a bowel movement all over himself after taking this abx          Relevant Vitals/Labs:  Lab Results   Component Value Date    LABA1C 13.0 (H) 02/28/2022    LABA1C 12.0 (H) 01/18/2022    LABA1C 12.2 (H) 12/06/2021      Hemoglobin A1C, POC   Date Value Ref Range Status   12/12/2023 11.8 % Final   10/27/2023 9.1 % Final   10/03/2022 12.8 % Final        Lab Results   Component Value

## 2023-12-28 ENCOUNTER — PHARMACY VISIT (OUTPATIENT)
Facility: CLINIC | Age: 88
End: 2023-12-28

## 2023-12-28 VITALS — BODY MASS INDEX: 16.53 KG/M2 | WEIGHT: 102.4 LBS

## 2023-12-28 DIAGNOSIS — E11.65 TYPE 2 DIABETES MELLITUS WITH HYPERGLYCEMIA, WITH LONG-TERM CURRENT USE OF INSULIN (HCC): Primary | ICD-10-CM

## 2023-12-28 DIAGNOSIS — Z79.4 TYPE 2 DIABETES MELLITUS WITH HYPERGLYCEMIA, WITH LONG-TERM CURRENT USE OF INSULIN (HCC): Primary | ICD-10-CM

## 2023-12-28 DIAGNOSIS — N52.01 ERECTILE DYSFUNCTION DUE TO ARTERIAL INSUFFICIENCY: ICD-10-CM

## 2023-12-28 RX ORDER — SILDENAFIL 100 MG/1
100 TABLET, FILM COATED ORAL PRN
Qty: 30 TABLET | Refills: 3 | Status: SHIPPED | OUTPATIENT
Start: 2023-12-28

## 2023-12-28 NOTE — PATIENT INSTRUCTIONS
-Take 10 units of Levemir insulin once daily     -Check blood sugar every day     -Trelegy (in the box) is taken once daily     -Albuterol (with the spacer) taken if needed for shortness of breath

## 2023-12-29 RX ORDER — BLOOD-GLUCOSE SENSOR
1 EACH MISCELLANEOUS
COMMUNITY

## 2023-12-29 RX ORDER — BLOOD-GLUCOSE,RECEIVER,CONT
1 EACH MISCELLANEOUS ONCE
COMMUNITY

## 2024-01-04 ENCOUNTER — CARE COORDINATION (OUTPATIENT)
Facility: CLINIC | Age: 89
End: 2024-01-04

## 2024-01-04 NOTE — CARE COORDINATION
.Patient has graduated from the Complex Care program on 1/4/24 Conversion cleanup no further ED or hospital admissions since last out reach.  Patient/family has the ability to self-manage at this time.  Care management goals have been completed. No further Ambulatory Care Manager follow up scheduled.     Goals Addressed                   This Visit's Progress     COMPLETED: Care Coordination Self Management   On track     CC Self Management Goal  Patient Goal (What steps will patient take to achieve goal?): >>>  Patient is able to discuss self-management of condition(s):  Pt demonstrates adherence to medications  Pt demonstrates understanding of self-monitoring  Patient is able to identify Red Flags:  Alert to potential adverse drug reactions(s) or side effects and actions to take should they arise  Discuss target symptoms and actions to take should they arise  Identify problems that require immediate PCP or specialist visit  Patient demonstrates understanding of access to PCP/Specialist:  Understands about scheduling routine Follow Up appointments   Understands about sick day appointment options for worsening of symptoms/progression (Same Day, E Visits)       COMPLETED: Follows nutrition recommendations and maintains goal weight (small/ frequent meals)   On track     Patient will follow Diabetic ,heart heathy diet>> Low salt,low saturated fats and no concentrated sweets diet.  Add Glucerna for calories Wt 102 lbs with height 5'6\"       COMPLETED: Reduce ED Utilization   On track     Patient will monitor hsi blood glucose to know when to go to ED with symptoms  Patient will keep Healty snacks at bedside at night>> Peanut butter,peanut butter crackers, OJ  Patient will have a small snack at bedtime ( Glucerna, Peanut butter crackers, 4 oz milk)  Patient will take correct amount of Levemir Insulin  each bedtime  Call Provider office 24/7 if need to speak to a provider.       COMPLETED: Reduce risk of comorbid

## 2024-01-11 ENCOUNTER — PHARMACY VISIT (OUTPATIENT)
Facility: CLINIC | Age: 89
End: 2024-01-11

## 2024-01-11 DIAGNOSIS — Z79.4 TYPE 2 DIABETES MELLITUS WITH HYPERGLYCEMIA, WITH LONG-TERM CURRENT USE OF INSULIN (HCC): Primary | ICD-10-CM

## 2024-01-11 DIAGNOSIS — E11.65 TYPE 2 DIABETES MELLITUS WITH HYPERGLYCEMIA, WITH LONG-TERM CURRENT USE OF INSULIN (HCC): Primary | ICD-10-CM

## 2024-01-11 NOTE — PATIENT INSTRUCTIONS
Inject 10 units of insulin at night   Check blood sugar every morning before eating/drinking   Continue Jardiance 25 mg once daily

## 2024-01-11 NOTE — PROGRESS NOTES
82 mg/dL    5.0                        97 mg/dL    5.5                        111 mg/dL    6                          126 mg/dL    6.5                        140 mg/dL    7                          154 mg/dL    7.5                        169 mg/dL    8                          183 mg/dL    8.5                        197 mg/dL    9                          212 mg/dL    9.5                        226 mg/dL    10                         240 mg/dL    10.5                       255 mg/dL    11                         269 mg/dL    11.5                       283 mg/dL    12                         298 mg/dL       Hemoglobin A1C, POC   Date Value Ref Range Status   12/12/2023 11.8 % Final       Current anti-hyperglycemic regimen includes:    Key Antihyperglycemic Medications               insulin detemir (LEVEMIR) 100 UNIT/ML injection pen Inject 15 Units into the skin nightly    Glucagon (GVOKE HYPOPEN 1-PACK) 0.5 MG/0.1ML SOAJ Inject 1 pen  into the skin as needed (hypoglycemia)    empagliflozin (JARDIANCE) 25 MG tablet Take 1 tablet by mouth daily            Interim update: Pt was last seen by me on 12/28/2023.  Per my prior note: Patient has not been taking his insulin or his BG since last appointment, but still remains taking the Jardiance daily. Discussed option of a CGM for the patient to be able to monitor his BG better than with finger sticks. Patient has a flip phone, so he would need a reader, so had to order through [facility] pharmacy. Ordered the Alin 3 sensors since it doesn't need scanned frequently to keep data and it is changed less frequently than the Dexcom. Instructed pt to bring the device with him to his next appointment and will put on and train how to use reader. Patient can come in every 2 weeks to get sensor changed if needed. Instructed pt to start taking insulin, will reduce to 10 units since unsure of BG and to help with patient's comfort level. Wrote 10 on the patient's insulin

## 2024-01-12 ENCOUNTER — CLINICAL DOCUMENTATION (OUTPATIENT)
Facility: CLINIC | Age: 89
End: 2024-01-12

## 2024-01-12 NOTE — PROGRESS NOTES
Contacted patient's insurance to inquire about co-pay for Alin 3 reader and sensor. They stated the patient has a plan where he is responsible for 20% of the full cost, which is why the patient had a co-pay.    Contacted [facility] to gain more information about co-pays and day supplies for the Alin 3 reader and sensors. The co-pay for the first shipment, including the reader with sensors for a 90 day supply would be $141.89. The co-pay for the fills after that of just sensors will be $105.18 for 90 day supply. Asked the representative to test for Dexcom to see if it would be more affordable, but the co-pays would be more expensive - $205.42 for first shipment of reader with 90 days of sensors and $152.27 for 90 days of sensors.     [facility]  states that the patient could speak with billing and opt to pay monthly vs the co-pay upfront.     Will give patient updated information at next visit.    Thank you,   Nilda Mckinnon, PharmD  Clinical Pharmacist   1/12/24       For Pharmacy Admin Tracking Only    Program: Medical Group  CPA in place:  Yes  Time Spent (min): 45

## 2024-01-18 ENCOUNTER — TELEPHONE (OUTPATIENT)
Facility: CLINIC | Age: 89
End: 2024-01-18

## 2024-01-22 NOTE — PROGRESS NOTES
Pharmacy Progress Note - Diabetes Management Follow up       Assessment / Plan:   Diabetes Management:  Per ADA guidelines, Pt's A1c is not at goal of < 7%.  Patient's most recent A1c was  11.8% on 12/12/23. Patient has been taking Levemir 10 units every day. He has been checking his BG a few days, but could only see the few readings from the meter he brought to appointment. Instructed him to bring both meters to next appointment and check BG every morning before eating or taking medications. Since pt's BG that were able to be reviewed are still high, increased Levemir to 12 units nightly. Will reassess BG values in 2 weeks.     Medication therapy changes:   - Increase Levemir to 12 units daily     Nutrition/Lifestyle Modifications:  - Orange juice is high in sugars, try to cut down on the amount of orange juice as this can raise BG.  - Reduce amount of foods that have carbohydrates to try to prevent sugars from spiking  - If you put sugar in your coffee, try to use the sweet'n low, Splenda, etc.     Patient will return to clinic in 2 week(s) for follow up.        S/O: Mr. Tony Calle, a 88 y.o. male referred by Timo Fairbanks MD,  has a past medical history of Abdominal pain, Acute cystitis with hematuria, Bladder mass, Bladder pain, BPH (benign prostatic hyperplasia), BPH with obstruction/lower urinary tract symptoms, Chronic prostatitis, Diabetes (HCC), ED (erectile dysfunction), Elevated PSA, Essential hypertension, Hematuria, History of UTI, Hypercholesterolemia, Nocturia, Urinary tract infection, site not specified, and Urine leukocytes.  Pt was seen today for diabetes management.      In the past, the patient has tried the following: he doesn't think that he's tried anything else. Has been on insulin 1-2 years. Patient denies personal/family history of thyroid cancer, denies history of pancreatitis, and denies issues with recurrent UTI/yeast infections.    Patient's last A1c was:   Hemoglobin A1C   Date

## 2024-01-23 ENCOUNTER — PHARMACY VISIT (OUTPATIENT)
Facility: CLINIC | Age: 89
End: 2024-01-23

## 2024-01-23 ENCOUNTER — TELEPHONE (OUTPATIENT)
Facility: CLINIC | Age: 89
End: 2024-01-23

## 2024-01-23 VITALS — WEIGHT: 105.4 LBS | BODY MASS INDEX: 17.01 KG/M2

## 2024-01-23 DIAGNOSIS — E11.65 TYPE 2 DIABETES MELLITUS WITH HYPERGLYCEMIA, WITH LONG-TERM CURRENT USE OF INSULIN (HCC): Primary | ICD-10-CM

## 2024-01-23 DIAGNOSIS — Z79.4 TYPE 2 DIABETES MELLITUS WITH HYPERGLYCEMIA, WITH LONG-TERM CURRENT USE OF INSULIN (HCC): Primary | ICD-10-CM

## 2024-01-23 NOTE — PATIENT INSTRUCTIONS
Inject 12 units of Levemir at night     Check blood sugar each morning and bring sugar meters to appointment

## 2024-01-23 NOTE — TELEPHONE ENCOUNTER
Pharmacy Progress Note - Telephone Call    Mr. Tony Calle 88 y.o. was contacted via an outbound telephone call today to reschedule their missed appointment with PharmD today at 8:30am per referral from Timo Fairbanks MD.  A voicemail was left for pt to return the call to [phone]. Called patient back again - he answered and is able to come today at 9:30am.    Thank you,    Nilda Mckinnon, PharmD  Clinical Pharmacist  01/23/24        For Pharmacy Admin Tracking Only    Program: Medical Group  CPA in place:  Yes  Time Spent (min): 5

## 2024-02-06 ENCOUNTER — TELEPHONE (OUTPATIENT)
Facility: CLINIC | Age: 89
End: 2024-02-06

## 2024-02-06 NOTE — TELEPHONE ENCOUNTER
Pharmacy Progress Note - Telephone Call    Mr. Tony Calle 88 y.o. was contacted via an outbound telephone call today to reschedule their missed appointment with PharmD today at 11am per referral from Timo Fairbanks MD. Patient forgot he had an appointment and is just waking up. Offered to see patient this afternoon, however he states he is not feeling well and would not have enough time to get ready for the appointment and would like a different day. Patient rescheduled for Thursday 2/8/24 at 3:30pm.     Thank you,    Nilda Mckinnon, PharmD  Clinical Pharmacist  02/06/24      For Pharmacy Admin Tracking Only    Program: Medical Group  CPA in place:  Yes  Recommendation Provided To: Patient/Caregiver: 1 via Telephone  Intervention Detail: Scheduled Appointment  Intervention Accepted By: Patient/Caregiver: 1  Gap Closed?: No   Time Spent (min): 5

## 2024-02-08 ENCOUNTER — PHARMACY VISIT (OUTPATIENT)
Facility: CLINIC | Age: 89
End: 2024-02-08

## 2024-02-08 VITALS — WEIGHT: 104.4 LBS | BODY MASS INDEX: 16.85 KG/M2

## 2024-02-08 DIAGNOSIS — Z79.4 TYPE 2 DIABETES MELLITUS WITH HYPERGLYCEMIA, WITH LONG-TERM CURRENT USE OF INSULIN (HCC): Primary | ICD-10-CM

## 2024-02-08 DIAGNOSIS — E11.65 TYPE 2 DIABETES MELLITUS WITH HYPERGLYCEMIA, WITH LONG-TERM CURRENT USE OF INSULIN (HCC): Primary | ICD-10-CM

## 2024-02-08 NOTE — PATIENT INSTRUCTIONS
12 units of Levemir insulin at night     Check sugar every morning before eating/drinking/medications    Eat better snacks such as:  Ham and cheese   Salami and cheese  Turkey and cheese   Vegetables (carrots, broccoli, tomatoes, celery) with peanut butter or Hummus   Cheese (swiss, provolone, american)  Cottage cheese  Salads    Get juice that is 100% juice with no added sugars (such as Motts 100% apple juice)

## 2024-02-08 NOTE — PROGRESS NOTES
Medical Group  CPA in place:  Yes  Recommendation Provided To: Patient/Caregiver: 1 via In person  Intervention Detail: Adherence Monitorin  Intervention Accepted By: Patient/Caregiver: 1  Gap Closed?: No   Time Spent (min): 45

## 2024-02-22 ENCOUNTER — PHARMACY VISIT (OUTPATIENT)
Facility: CLINIC | Age: 89
End: 2024-02-22

## 2024-02-22 ENCOUNTER — OFFICE VISIT (OUTPATIENT)
Facility: CLINIC | Age: 89
End: 2024-02-22
Payer: MEDICARE

## 2024-02-22 VITALS
HEIGHT: 66 IN | DIASTOLIC BLOOD PRESSURE: 61 MMHG | HEART RATE: 103 BPM | SYSTOLIC BLOOD PRESSURE: 86 MMHG | RESPIRATION RATE: 16 BRPM | WEIGHT: 100 LBS | BODY MASS INDEX: 16.07 KG/M2 | TEMPERATURE: 97.6 F

## 2024-02-22 DIAGNOSIS — Z79.4 TYPE 2 DIABETES MELLITUS WITH HYPERGLYCEMIA, WITH LONG-TERM CURRENT USE OF INSULIN (HCC): Primary | ICD-10-CM

## 2024-02-22 DIAGNOSIS — Z79.4 DIABETES MELLITUS DUE TO UNDERLYING CONDITION WITH DIABETIC NEUROPATHY, WITH LONG-TERM CURRENT USE OF INSULIN (HCC): ICD-10-CM

## 2024-02-22 DIAGNOSIS — J43.1 PANLOBULAR EMPHYSEMA (HCC): Primary | ICD-10-CM

## 2024-02-22 DIAGNOSIS — F33.41 RECURRENT MAJOR DEPRESSIVE DISORDER, IN PARTIAL REMISSION (HCC): ICD-10-CM

## 2024-02-22 DIAGNOSIS — E08.40 DIABETES MELLITUS DUE TO UNDERLYING CONDITION WITH DIABETIC NEUROPATHY, WITH LONG-TERM CURRENT USE OF INSULIN (HCC): ICD-10-CM

## 2024-02-22 DIAGNOSIS — N30.00 ACUTE CYSTITIS WITHOUT HEMATURIA: ICD-10-CM

## 2024-02-22 DIAGNOSIS — E11.65 TYPE 2 DIABETES MELLITUS WITH HYPERGLYCEMIA, WITH LONG-TERM CURRENT USE OF INSULIN (HCC): Primary | ICD-10-CM

## 2024-02-22 DIAGNOSIS — E11.65 TYPE 2 DIABETES MELLITUS WITH HYPERGLYCEMIA, WITH LONG-TERM CURRENT USE OF INSULIN (HCC): ICD-10-CM

## 2024-02-22 DIAGNOSIS — Z79.4 TYPE 2 DIABETES MELLITUS WITH HYPERGLYCEMIA, WITH LONG-TERM CURRENT USE OF INSULIN (HCC): ICD-10-CM

## 2024-02-22 PROCEDURE — 1036F TOBACCO NON-USER: CPT | Performed by: FAMILY MEDICINE

## 2024-02-22 PROCEDURE — G8427 DOCREV CUR MEDS BY ELIG CLIN: HCPCS | Performed by: FAMILY MEDICINE

## 2024-02-22 PROCEDURE — 3023F SPIROM DOC REV: CPT | Performed by: FAMILY MEDICINE

## 2024-02-22 PROCEDURE — G8419 CALC BMI OUT NRM PARAM NOF/U: HCPCS | Performed by: FAMILY MEDICINE

## 2024-02-22 PROCEDURE — G8484 FLU IMMUNIZE NO ADMIN: HCPCS | Performed by: FAMILY MEDICINE

## 2024-02-22 PROCEDURE — 99214 OFFICE O/P EST MOD 30 MIN: CPT | Performed by: FAMILY MEDICINE

## 2024-02-22 PROCEDURE — 1123F ACP DISCUSS/DSCN MKR DOCD: CPT | Performed by: FAMILY MEDICINE

## 2024-02-22 RX ORDER — SITAGLIPTIN 25 MG/1
25 TABLET, FILM COATED ORAL DAILY
COMMUNITY
Start: 2024-02-16

## 2024-02-22 RX ORDER — OMEPRAZOLE 20 MG/1
20 CAPSULE, DELAYED RELEASE ORAL DAILY
COMMUNITY
Start: 2024-02-17

## 2024-02-22 RX ORDER — LANOLIN ALCOHOL/MO/W.PET/CERES
1000 CREAM (GRAM) TOPICAL DAILY
COMMUNITY
Start: 2024-02-17

## 2024-02-22 RX ORDER — AMOXICILLIN AND CLAVULANATE POTASSIUM 500; 125 MG/1; MG/1
1 TABLET, FILM COATED ORAL 3 TIMES DAILY
COMMUNITY
Start: 2024-02-17 | End: 2024-02-22 | Stop reason: ALTCHOICE

## 2024-02-22 RX ORDER — ONDANSETRON 4 MG/1
4 TABLET, ORALLY DISINTEGRATING ORAL
COMMUNITY
Start: 2024-02-16

## 2024-02-22 RX ORDER — ASPIRIN 81 MG
81 TABLET,CHEWABLE ORAL DAILY
COMMUNITY

## 2024-02-22 RX ORDER — CIPROFLOXACIN 500 MG/1
500 TABLET, FILM COATED ORAL 2 TIMES DAILY
Qty: 14 TABLET | Refills: 0 | Status: SHIPPED | OUTPATIENT
Start: 2024-02-22 | End: 2024-02-29

## 2024-02-22 ASSESSMENT — PATIENT HEALTH QUESTIONNAIRE - PHQ9
SUM OF ALL RESPONSES TO PHQ QUESTIONS 1-9: 0
9. THOUGHTS THAT YOU WOULD BE BETTER OFF DEAD, OR OF HURTING YOURSELF: 0
SUM OF ALL RESPONSES TO PHQ9 QUESTIONS 1 & 2: 0
10. IF YOU CHECKED OFF ANY PROBLEMS, HOW DIFFICULT HAVE THESE PROBLEMS MADE IT FOR YOU TO DO YOUR WORK, TAKE CARE OF THINGS AT HOME, OR GET ALONG WITH OTHER PEOPLE: 0
5. POOR APPETITE OR OVEREATING: 0
6. FEELING BAD ABOUT YOURSELF - OR THAT YOU ARE A FAILURE OR HAVE LET YOURSELF OR YOUR FAMILY DOWN: 0
1. LITTLE INTEREST OR PLEASURE IN DOING THINGS: 0
7. TROUBLE CONCENTRATING ON THINGS, SUCH AS READING THE NEWSPAPER OR WATCHING TELEVISION: 0
2. FEELING DOWN, DEPRESSED OR HOPELESS: 0
8. MOVING OR SPEAKING SO SLOWLY THAT OTHER PEOPLE COULD HAVE NOTICED. OR THE OPPOSITE, BEING SO FIGETY OR RESTLESS THAT YOU HAVE BEEN MOVING AROUND A LOT MORE THAN USUAL: 0
SUM OF ALL RESPONSES TO PHQ QUESTIONS 1-9: 0
SUM OF ALL RESPONSES TO PHQ QUESTIONS 1-9: 0
4. FEELING TIRED OR HAVING LITTLE ENERGY: 0
SUM OF ALL RESPONSES TO PHQ QUESTIONS 1-9: 0
3. TROUBLE FALLING OR STAYING ASLEEP: 0

## 2024-02-22 NOTE — PROGRESS NOTES
Tony Calle is a 88 y.o.  male and presents with    Chief Complaint   Patient presents with    Follow-up     2/12/2024 admitted for diabetic ketoacidosis  2/16/2024 discharged to home  2/22/2024 follow up in office    Subjective:  Mr. Calle is following up after hospitalization for ketoacidosis and urinary tract infection; he has been taking januvia in place of jardiance; he has not been monitoring his blood glucose and has not been giving insulin.  He has lower abdominal pain on the right side.    He has improved thirst and urinary frequency.      ROS   Constitutional:  Negative for activity change and appetite change.   HENT:  Negative for sinus pressure, sinus pain, sneezing and sore throat.    Respiratory:  Negative for cough and shortness of breath.    Cardiovascular:  Negative for chest pain, palpitations and leg swelling.   Gastrointestinal: No constipation, diarrhea, nausea or vomiting.   Endocrine: Positive for polydipsia and polyuria but improved.   Genitourinary:  Negative for difficulty urinating and dysuria.   Neurological:  Negative for dizziness, syncope, light-headedness and headaches.    All other systems reviewed and are negative.      Objective:  BP (!) 86/61 (Site: Left Upper Arm)   Pulse (!) 103   Temp 97.6 °F (36.4 °C) (Temporal)   Resp 16   Ht 1.676 m (5' 6\")   Wt 45.4 kg (100 lb)   BMI 16.14 kg/m²       alert, well appearing, and in no distress, oriented to person, place, and time, and agitated  Mental status - agitated  Chest - clear to auscultation, no wheezes, rales or rhonchi, symmetric air entry  Heart - normal rate, regular rhythm, normal S1, S2, no murmurs, rubs, clicks or gallops  Skin - normal coloration and turgor, no rashes, no suspicious skin lesions noted  Neuro - CN II-XII intact normal gait    LABS   Hgb a1c  TESTS      Assessment/Plan:    1. Panlobular emphysema (HCC)  Continue inhaled therapy    2. Recurrent major depressive disorder, in partial 
vaccine (1 of 2) Never done    Respiratory Syncytial Virus (RSV) Pregnant or age 60 yrs+ (1 - 1-dose 60+ series) Never done    COVID-19 Vaccine (3 - 2023-24 season) 09/01/2023    Prostate Specific Antigen (PSA) Screening or Monitoring  09/09/2023    Annual Wellness Visit (Medicare Advantage)  01/01/2024         Coordination of Care:   1. \"Have you been to the ER, urgent care clinic since your last visit?  Hospitalized since your last visit?\" yes,   2. \"Have you seen or consulted any other health care providers outside of the [facility] since your last visit?\" no    3. For patients aged 45-75: Has the patient had a colonoscopy / FIT/ Cologuard?NA - based on age or sex  If the patient is female:    4. For patients aged 40-74: Has the patient had a mammogram within the past 2 years? NA - based on age or sex    5. For patients aged 21-65: Has the patient had a pap smear? NA - based on age or sex    Advanced Directive:  1. Do you have an Advanced Directive? Yes     2. Would you like information on Advanced Directives? No

## 2024-02-22 NOTE — PROGRESS NOTES
Pharmacy Progress Note - Diabetes Management Follow up       Assessment / Plan:   Diabetes Management:  Per ADA guidelines, Pt's A1c is not at goal of < 7%.  Patient's most recent A1c was  12.7% on 2/13/24, which has worsened from previous POC A1c of 11.8% on 12/12/23, likely due to pt's non-compliance with regimen. Pt presents today with girlfriend to appointment. He was recently hospitalized for DKA. His insulin was reduced to 8 units, but was taking his previously prescribed 12 units from last appointment. Pt is to continue the 12 units of Levemir daily. His Jardiance was discontinued at discharge and started on Januvia 25 mg daily. Pt's kidney function is still appropriate for Jardiance, unsure why it was discontinued at discharge. Will re-start Jardiance 25 mg and continue Januvia 25 mg daily. Activated the sensor that was on patient's arm. Will reassess CGM data and medication regimen in 2 weeks.    Medication therapy changes:   - Restart Jardiance 25 mg once daily in the morning   - Continue Levemir 12 units daily   - Continue Januvia 25 mg once daily in the morning    Medication Review:   Pt was started on multiple medications in the hospital. Reviewed the pt's medications. He had bottles with him. Added omeprazole, Zofran PRN, Januvia, vitamin B12, aspirin, and Augmentin. He only took 1 tablet of Augmentin and didn't finish remaining tablets because they were too big. Timo Fairbanks MD is following up with pt today after appointment.      There are no discontinued medications.     Orders Placed This Encounter    omeprazole (PRILOSEC) 20 MG delayed release capsule     Sig: Take 1 capsule by mouth daily    ondansetron (ZOFRAN-ODT) 4 MG disintegrating tablet     Sig: Take 1 tablet by mouth 3 times daily (with meals)    JANUVIA 25 MG tablet     Sig: Take 1 tablet by mouth daily    vitamin B-12 (CYANOCOBALAMIN) 1000 MCG tablet     Sig: Take 1 tablet by mouth daily    ASPIRIN LOW DOSE 81 MG chewable tablet

## 2024-02-22 NOTE — PATIENT INSTRUCTIONS
Levemir 12 units daily   Jardiance 25 mg once daily in the morning   Januvia 25 mg once daily in the morning

## 2024-02-23 ENCOUNTER — HOME HEALTH ADMISSION (OUTPATIENT)
Age: 89
End: 2024-02-23

## 2024-02-23 ENCOUNTER — TELEPHONE (OUTPATIENT)
Facility: CLINIC | Age: 89
End: 2024-02-23

## 2024-02-23 NOTE — TELEPHONE ENCOUNTER
Ms. Nathen Quispe is calling to get a verbal \"Yes\" or \"No\" to confirm patient's chronic condition in order for patient to remain eligible for plan.    Phone #:  [phone].    Please call at your earliest convenience.  Thank you.

## 2024-02-25 ENCOUNTER — HOME CARE VISIT (OUTPATIENT)
Age: 89
End: 2024-02-25

## 2024-02-26 ENCOUNTER — TELEPHONE (OUTPATIENT)
Facility: CLINIC | Age: 89
End: 2024-02-26

## 2024-02-26 ENCOUNTER — HOME CARE VISIT (OUTPATIENT)
Age: 89
End: 2024-02-26

## 2024-02-26 NOTE — TELEPHONE ENCOUNTER
Home Care Nurse is calling to advise patient's blood sugar is high.    First reading this morning was 86 @ 8:04am  10:34am  was 241  1:11pm was 244  3:55pm was 309      Fanny Nurse with [facility]  her number [phone]    Urgent Request    Please advise    Thank you

## 2024-03-04 ENCOUNTER — TELEPHONE (OUTPATIENT)
Facility: CLINIC | Age: 89
End: 2024-03-04

## 2024-03-04 NOTE — TELEPHONE ENCOUNTER
Spoke with Fanny from [facility] and told her that Dr Fairbanks wants him to stop taking Jardiance and continue taking Farxiga and Januvia

## 2024-03-04 NOTE — TELEPHONE ENCOUNTER
Fanny with [facility] contacted the office requesting medication clarification on the patient's medications. Patient reported to nurse that he has been taking his: fluticasone-umeclidin-vilant (TRELEGY ELLIPTA) 100-62.5-25 MCG/ACT AEPB inhaler as directed however, after he inhales he is experiencing chest pain, stomach pain, and nausea. He states he threw up this morning following the inhalation.She also wants to know if the patient is still supposed to be taking empagliflozin (JARDIANCE) 25 MG tablet, Breo Ellipta and Vitamin D 500 mg as he was prescribed in the hospital.  Please advise, thank you.    Call back: [phone]

## 2024-03-07 ENCOUNTER — TELEPHONE (OUTPATIENT)
Facility: CLINIC | Age: 89
End: 2024-03-07

## 2024-03-07 NOTE — TELEPHONE ENCOUNTER
Pharmacy Progress Note - Telephone Call    Mr. Tony Calle 88 y.o. was contacted via an outbound telephone call today to reschedule their missed appointment with PharmD. Pt requested to call back later today so he could check his schedule. Was able to reschedule patient for next Thursday 3/14 at 11:30am.     Thank you,    Nilda Mckinnon PharmD  Clinical Pharmacist  03/07/24      For Pharmacy Admin Tracking Only    Program: Medical Group  CPA in place:  Yes  Recommendation Provided To: Patient/Caregiver: 1 via Telephone  Intervention Detail: Scheduled Appointment  Intervention Accepted By: Patient/Caregiver: 1  Gap Closed?: No   Time Spent (min): 5

## 2024-03-07 NOTE — TELEPHONE ENCOUNTER
Pharmacy Progress Note - Telephone Call    Mr. Tony Calle 88 y.o. was contacted via an outbound telephone call today to reschedule their missed appointment with PharmD today at 9:30am per referral from Timo Fairbanks MD.  Patient states that he didn't know he had an appointment today. Attempted to reschedule patient, however he requested for me to call back later this evening before scheduling appointment.    Thank you,    Nilda Mckinnon PharmD  Clinical Pharmacist  03/07/24        For Pharmacy Admin Tracking Only    Program: Medical Group  CPA in place:  Yes  Time Spent (min): 5

## 2024-03-13 ENCOUNTER — TELEPHONE (OUTPATIENT)
Facility: CLINIC | Age: 89
End: 2024-03-13

## 2024-03-13 NOTE — TELEPHONE ENCOUNTER
Pharmacy Progress Note - Telephone Call    Mr. Tony Calle 88 y.o. was contacted via an outbound telephone call today to remind patient that he has an appointment tomorrow at 11:30am. Patient states he will be there.     Thank you,    Nilda Mckinnon, PharmD  Clinical Pharmacist  03/13/24      For Pharmacy Admin Tracking Only    Program: Medical Group  CPA in place:  Yes  Time Spent (min): 5

## 2024-03-14 ENCOUNTER — PHARMACY VISIT (OUTPATIENT)
Facility: CLINIC | Age: 89
End: 2024-03-14

## 2024-03-14 VITALS — BODY MASS INDEX: 17.3 KG/M2 | WEIGHT: 107.2 LBS

## 2024-03-14 DIAGNOSIS — Z79.4 TYPE 2 DIABETES MELLITUS WITH HYPERGLYCEMIA, WITH LONG-TERM CURRENT USE OF INSULIN (HCC): Primary | ICD-10-CM

## 2024-03-14 DIAGNOSIS — E11.65 TYPE 2 DIABETES MELLITUS WITH HYPERGLYCEMIA, WITH LONG-TERM CURRENT USE OF INSULIN (HCC): Primary | ICD-10-CM

## 2024-03-14 RX ORDER — INSULIN GLARGINE 100 [IU]/ML
7 INJECTION, SOLUTION SUBCUTANEOUS 2 TIMES DAILY
Qty: 15 ML | Refills: 2 | Status: SHIPPED | OUTPATIENT
Start: 2024-03-14

## 2024-04-01 DIAGNOSIS — E53.8 VITAMIN B 12 DEFICIENCY: ICD-10-CM

## 2024-04-01 DIAGNOSIS — Z79.4 TYPE 2 DIABETES MELLITUS WITH HYPERGLYCEMIA, WITH LONG-TERM CURRENT USE OF INSULIN (HCC): Primary | ICD-10-CM

## 2024-04-01 DIAGNOSIS — E11.65 TYPE 2 DIABETES MELLITUS WITH HYPERGLYCEMIA, WITH LONG-TERM CURRENT USE OF INSULIN (HCC): Primary | ICD-10-CM

## 2024-04-01 NOTE — TELEPHONE ENCOUNTER
MEDICATION REFILL REQUESTS      Farxiga    Chera-m vitamin    I did not see this medication on the patients med list, is the patient currently taking this medication?  Please fill these to to the [facility] in the chart.    Thank you!

## 2024-04-02 RX ORDER — LANOLIN ALCOHOL/MO/W.PET/CERES
1000 CREAM (GRAM) TOPICAL DAILY
Qty: 30 TABLET | Refills: 5 | Status: SHIPPED | OUTPATIENT
Start: 2024-04-02

## 2024-04-02 RX ORDER — DAPAGLIFLOZIN 10 MG/1
10 TABLET, FILM COATED ORAL DAILY
COMMUNITY
Start: 2024-02-29 | End: 2024-04-02 | Stop reason: SDUPTHER

## 2024-04-02 RX ORDER — DAPAGLIFLOZIN 10 MG/1
10 TABLET, FILM COATED ORAL DAILY
Qty: 30 TABLET | Refills: 0 | Status: SHIPPED | OUTPATIENT
Start: 2024-04-02

## 2024-04-10 ENCOUNTER — TELEPHONE (OUTPATIENT)
Facility: CLINIC | Age: 89
End: 2024-04-10

## 2024-04-10 NOTE — TELEPHONE ENCOUNTER
Fanny a Home Health care nurse called in with the patient inquiring If medication dapagliflozin (FARXIGA) 10 MG tablet  could be changed from a 30 day supply to 90 day supply.       Please assist, Thank you.

## 2024-04-11 DIAGNOSIS — E11.65 TYPE 2 DIABETES MELLITUS WITH HYPERGLYCEMIA, WITH LONG-TERM CURRENT USE OF INSULIN (HCC): ICD-10-CM

## 2024-04-11 DIAGNOSIS — Z79.4 TYPE 2 DIABETES MELLITUS WITH HYPERGLYCEMIA, WITH LONG-TERM CURRENT USE OF INSULIN (HCC): ICD-10-CM

## 2024-04-12 RX ORDER — DAPAGLIFLOZIN 10 MG/1
10 TABLET, FILM COATED ORAL DAILY
Qty: 90 TABLET | Refills: 0 | Status: SHIPPED | OUTPATIENT
Start: 2024-04-12

## 2024-04-25 ENCOUNTER — OFFICE VISIT (OUTPATIENT)
Facility: CLINIC | Age: 89
End: 2024-04-25
Payer: MEDICARE

## 2024-04-25 VITALS
BODY MASS INDEX: 18.19 KG/M2 | RESPIRATION RATE: 17 BRPM | WEIGHT: 113.2 LBS | HEART RATE: 77 BPM | DIASTOLIC BLOOD PRESSURE: 75 MMHG | OXYGEN SATURATION: 95 % | HEIGHT: 66 IN | SYSTOLIC BLOOD PRESSURE: 123 MMHG | TEMPERATURE: 97.8 F

## 2024-04-25 DIAGNOSIS — Z79.4 DIABETES MELLITUS DUE TO UNDERLYING CONDITION WITH DIABETIC NEUROPATHY, WITH LONG-TERM CURRENT USE OF INSULIN (HCC): ICD-10-CM

## 2024-04-25 DIAGNOSIS — E08.40 DIABETES MELLITUS DUE TO UNDERLYING CONDITION WITH DIABETIC NEUROPATHY, WITH LONG-TERM CURRENT USE OF INSULIN (HCC): ICD-10-CM

## 2024-04-25 DIAGNOSIS — E11.65 TYPE 2 DIABETES MELLITUS WITH HYPERGLYCEMIA, WITH LONG-TERM CURRENT USE OF INSULIN (HCC): Primary | ICD-10-CM

## 2024-04-25 DIAGNOSIS — J43.1 PANLOBULAR EMPHYSEMA (HCC): ICD-10-CM

## 2024-04-25 DIAGNOSIS — Z79.4 TYPE 2 DIABETES MELLITUS WITH HYPERGLYCEMIA, WITH LONG-TERM CURRENT USE OF INSULIN (HCC): Primary | ICD-10-CM

## 2024-04-25 DIAGNOSIS — D47.02 SYSTEMIC MASTOCYTOSIS: ICD-10-CM

## 2024-04-25 LAB — HBA1C MFR BLD: 7.3 %

## 2024-04-25 PROCEDURE — G8419 CALC BMI OUT NRM PARAM NOF/U: HCPCS | Performed by: FAMILY MEDICINE

## 2024-04-25 PROCEDURE — 99214 OFFICE O/P EST MOD 30 MIN: CPT | Performed by: FAMILY MEDICINE

## 2024-04-25 PROCEDURE — 3023F SPIROM DOC REV: CPT | Performed by: FAMILY MEDICINE

## 2024-04-25 PROCEDURE — 1123F ACP DISCUSS/DSCN MKR DOCD: CPT | Performed by: FAMILY MEDICINE

## 2024-04-25 PROCEDURE — 83036 HEMOGLOBIN GLYCOSYLATED A1C: CPT | Performed by: FAMILY MEDICINE

## 2024-04-25 PROCEDURE — G8427 DOCREV CUR MEDS BY ELIG CLIN: HCPCS | Performed by: FAMILY MEDICINE

## 2024-04-25 PROCEDURE — 1036F TOBACCO NON-USER: CPT | Performed by: FAMILY MEDICINE

## 2024-04-25 RX ORDER — PREGABALIN 75 MG/1
75 CAPSULE ORAL 2 TIMES DAILY
Qty: 60 CAPSULE | Refills: 5 | Status: SHIPPED | OUTPATIENT
Start: 2024-04-25 | End: 2024-10-22

## 2024-04-25 RX ORDER — OMEPRAZOLE 20 MG/1
20 CAPSULE, DELAYED RELEASE ORAL DAILY
Qty: 90 CAPSULE | Refills: 3 | Status: SHIPPED | OUTPATIENT
Start: 2024-04-25

## 2024-04-25 SDOH — ECONOMIC STABILITY: FOOD INSECURITY: WITHIN THE PAST 12 MONTHS, YOU WORRIED THAT YOUR FOOD WOULD RUN OUT BEFORE YOU GOT MONEY TO BUY MORE.: NEVER TRUE

## 2024-04-25 SDOH — ECONOMIC STABILITY: FOOD INSECURITY: WITHIN THE PAST 12 MONTHS, THE FOOD YOU BOUGHT JUST DIDN'T LAST AND YOU DIDN'T HAVE MONEY TO GET MORE.: NEVER TRUE

## 2024-04-25 SDOH — ECONOMIC STABILITY: INCOME INSECURITY: HOW HARD IS IT FOR YOU TO PAY FOR THE VERY BASICS LIKE FOOD, HOUSING, MEDICAL CARE, AND HEATING?: NOT HARD AT ALL

## 2024-04-25 ASSESSMENT — PATIENT HEALTH QUESTIONNAIRE - PHQ9
SUM OF ALL RESPONSES TO PHQ QUESTIONS 1-9: 0
5. POOR APPETITE OR OVEREATING: NOT AT ALL
SUM OF ALL RESPONSES TO PHQ QUESTIONS 1-9: 0
8. MOVING OR SPEAKING SO SLOWLY THAT OTHER PEOPLE COULD HAVE NOTICED. OR THE OPPOSITE, BEING SO FIGETY OR RESTLESS THAT YOU HAVE BEEN MOVING AROUND A LOT MORE THAN USUAL: NOT AT ALL
SUM OF ALL RESPONSES TO PHQ9 QUESTIONS 1 & 2: 0
6. FEELING BAD ABOUT YOURSELF - OR THAT YOU ARE A FAILURE OR HAVE LET YOURSELF OR YOUR FAMILY DOWN: NOT AT ALL
7. TROUBLE CONCENTRATING ON THINGS, SUCH AS READING THE NEWSPAPER OR WATCHING TELEVISION: NOT AT ALL
10. IF YOU CHECKED OFF ANY PROBLEMS, HOW DIFFICULT HAVE THESE PROBLEMS MADE IT FOR YOU TO DO YOUR WORK, TAKE CARE OF THINGS AT HOME, OR GET ALONG WITH OTHER PEOPLE: NOT DIFFICULT AT ALL
3. TROUBLE FALLING OR STAYING ASLEEP: NOT AT ALL
2. FEELING DOWN, DEPRESSED OR HOPELESS: NOT AT ALL
4. FEELING TIRED OR HAVING LITTLE ENERGY: NOT AT ALL
SUM OF ALL RESPONSES TO PHQ QUESTIONS 1-9: 0
SUM OF ALL RESPONSES TO PHQ QUESTIONS 1-9: 0
1. LITTLE INTEREST OR PLEASURE IN DOING THINGS: NOT AT ALL
9. THOUGHTS THAT YOU WOULD BE BETTER OFF DEAD, OR OF HURTING YOURSELF: NOT AT ALL

## 2024-04-25 NOTE — PROGRESS NOTES
Tony Calle is a 88 y.o.  male and presents with    Chief Complaint   Patient presents with    Medication Refill       Subjective:  Mr. Calle is following up for diabetes; he is using insulin as prescribed; he has on his CGM average 147 over the past month.       He has emphysema but trelegy gives him nausea and he vomits.  He has improved thirst and urinary frequency.       ROS   Constitutional:  Negative for activity change and appetite change.   HENT:  Negative for sinus pressure, sinus pain, sneezing and sore throat.    Respiratory:  Negative for cough and shortness of breath.    Cardiovascular:  Negative for chest pain, palpitations and leg swelling.   Gastrointestinal: No constipation, diarrhea, nausea or vomiting.   Endocrine: Positive for polydipsia and polyuria but improved.   Genitourinary:  Negative for difficulty urinating and dysuria.   Neurological:  Negative for dizziness, syncope, light-headedness and headaches.       All other systems reviewed and are negative.      Objective:  Vitals:    04/25/24 1130   BP: 123/75   Pulse: 77   Resp: 17   Temp: 97.8 °F (36.6 °C)   SpO2: 95%         alert, well appearing, and in no distress, oriented to person, place, and time, and thin  Mental status - normal mood  Chest - clear to auscultation, no wheezes, rales or rhonchi, symmetric air entry  Heart - normal rate, regular rhythm, normal S1, S2, no murmurs, rubs, clicks or gallops  Skin - normal coloration and turgor, no rashes, no suspicious skin lesions noted  Neuro - CN II-XII intact normal gait    LABS   Hgb A1c 7.3  TESTS      Assessment/Plan:    1. 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)  Goal hgb A1c <7; congratulated pt on great improvement in glucose control; continue current management with CGM and insulin  - pregabalin (LYRICA) 75 MG capsule; Take 1 capsule by mouth 2 times daily for 180 days. Max Daily 
done    Shingles vaccine (1 of 2) Never done    Respiratory Syncytial Virus (RSV) Pregnant or age 60 yrs+ (1 - 1-dose 60+ series) Never done    COVID-19 Vaccine (3 - 2023-24 season) 09/01/2023    Prostate Specific Antigen (PSA) Screening or Monitoring  09/09/2023    Annual Wellness Visit (Medicare Advantage)  01/01/2024         Coordination of Care:   1. \"Have you been to the ER, urgent care clinic since your last visit?  Hospitalized since your last visit?\" no    2. \"Have you seen or consulted any other health care providers outside of the [facility] since your last visit?\" no    3. For patients aged 45-75: Has the patient had a colonoscopy / FIT/ Cologuard?NA - based on age or sex  If the patient is female:    4. For patients aged 40-74: Has the patient had a mammogram within the past 2 years? NA - based on age or sex    5. For patients aged 21-65: Has the patient had a pap smear? NA - based on age or sex    Advanced Directive:  1. Do you have an Advanced Directive? Yes     2. Would you like information on Advanced Directives? No

## 2024-04-26 ENCOUNTER — TELEPHONE (OUTPATIENT)
Facility: CLINIC | Age: 89
End: 2024-04-26

## 2024-04-26 RX ORDER — BUDESONIDE, GLYCOPYRROLATE, AND FORMOTEROL FUMARATE 160; 9; 4.8 UG/1; UG/1; UG/1
1 AEROSOL, METERED RESPIRATORY (INHALATION) 2 TIMES DAILY
Qty: 1 EACH | Refills: 12 | Status: SHIPPED | OUTPATIENT
Start: 2024-04-26

## 2024-04-26 NOTE — TELEPHONE ENCOUNTER
Pharmacy Progress Note - Telephone Call    Mr. Tony Calle 88 y.o. was contacted via an outbound telephone call today to reschedule their appointment with PharmD yesterday per referral from Timo Fairbanks MD. Pt had PCP appointment prior to PharmD appointment and appointment went beyond PharmD appointment time. He was hungry and started not to feel well and stated he couldn't stay for PharmD appointment and needed to go eat. Pt requested to reschedule. Since pt is doing well and appointment yesterday with Dr Fairbanks went really good, will follow up with patient in 1 month. Patient rescheduled for 5/23/24 at 8:30am. Instructed pt to bring list of medications and list of BG or CGM device to appointment.     Thank you,    Nilda Mckinnon, PharmD  Clinical Pharmacist  04/26/24    For Pharmacy Admin Tracking Only    Program: Medical Group  CPA in place:  Yes  Recommendation Provided To: Patient/Caregiver: 1 via Telephone  Intervention Detail: Scheduled Appointment  Intervention Accepted By: Patient/Caregiver: 1  Gap Closed?: No   Time Spent (min): 5

## 2024-05-16 DIAGNOSIS — E11.65 TYPE 2 DIABETES MELLITUS WITH HYPERGLYCEMIA, WITH LONG-TERM CURRENT USE OF INSULIN (HCC): ICD-10-CM

## 2024-05-16 DIAGNOSIS — D47.02 SYSTEMIC MASTOCYTOSIS: ICD-10-CM

## 2024-05-16 DIAGNOSIS — Z79.4 TYPE 2 DIABETES MELLITUS WITH HYPERGLYCEMIA, WITH LONG-TERM CURRENT USE OF INSULIN (HCC): ICD-10-CM

## 2024-05-16 DIAGNOSIS — Z79.4 DIABETES MELLITUS DUE TO UNDERLYING CONDITION WITH DIABETIC NEUROPATHY, WITH LONG-TERM CURRENT USE OF INSULIN (HCC): ICD-10-CM

## 2024-05-16 DIAGNOSIS — E08.40 DIABETES MELLITUS DUE TO UNDERLYING CONDITION WITH DIABETIC NEUROPATHY, WITH LONG-TERM CURRENT USE OF INSULIN (HCC): ICD-10-CM

## 2024-05-16 RX ORDER — PREGABALIN 75 MG/1
75 CAPSULE ORAL 2 TIMES DAILY
Qty: 60 CAPSULE | Refills: 5 | Status: SHIPPED | OUTPATIENT
Start: 2024-05-16 | End: 2024-11-12

## 2024-05-16 RX ORDER — INSULIN GLARGINE 100 [IU]/ML
25 INJECTION, SOLUTION SUBCUTANEOUS NIGHTLY
Qty: 15 ADJUSTABLE DOSE PRE-FILLED PEN SYRINGE | Refills: 3 | Status: SHIPPED | OUTPATIENT
Start: 2024-05-16

## 2024-05-16 RX ORDER — PEN NEEDLE, DIABETIC 32GX 5/32"
1 NEEDLE, DISPOSABLE MISCELLANEOUS 3 TIMES DAILY
Qty: 200 EACH | Refills: 5 | Status: SHIPPED | OUTPATIENT
Start: 2024-05-16

## 2024-05-16 RX ORDER — DAPAGLIFLOZIN 10 MG/1
10 TABLET, FILM COATED ORAL EVERY MORNING
Qty: 90 TABLET | Refills: 3 | Status: SHIPPED | OUTPATIENT
Start: 2024-05-16

## 2024-05-16 RX ORDER — OMEPRAZOLE 20 MG/1
20 CAPSULE, DELAYED RELEASE ORAL DAILY
Qty: 90 CAPSULE | Refills: 3 | Status: SHIPPED | OUTPATIENT
Start: 2024-05-16

## 2024-05-23 ENCOUNTER — TELEPHONE (OUTPATIENT)
Facility: CLINIC | Age: 89
End: 2024-05-23

## 2024-05-23 NOTE — TELEPHONE ENCOUNTER
Pharmacy Progress Note - Telephone Call    Mr. Tony Calle 88 y.o. was contacted via an outbound telephone call today to reschedule their missed appointment with PharmD today at 8:30am per referral from Timo Fairbanks MD.  A voicemail was left for pt to return the call to [phone] to reschedule appointment.      Thank you,    Nilda Mckinnon PharmD  Clinical Pharmacy Specialist  05/23/24       For Pharmacy Admin Tracking Only    Program: Medical Group  CPA in place:  Yes  Time Spent (min): 5

## 2024-05-28 ENCOUNTER — OFFICE VISIT (OUTPATIENT)
Age: 89
End: 2024-05-28
Payer: MEDICARE

## 2024-05-28 ENCOUNTER — PHARMACY VISIT (OUTPATIENT)
Facility: CLINIC | Age: 89
End: 2024-05-28

## 2024-05-28 ENCOUNTER — OFFICE VISIT (OUTPATIENT)
Facility: CLINIC | Age: 89
End: 2024-05-28
Payer: MEDICARE

## 2024-05-28 VITALS
TEMPERATURE: 97.8 F | RESPIRATION RATE: 14 BRPM | HEART RATE: 70 BPM | DIASTOLIC BLOOD PRESSURE: 69 MMHG | SYSTOLIC BLOOD PRESSURE: 119 MMHG | BODY MASS INDEX: 18.93 KG/M2 | HEIGHT: 66 IN | WEIGHT: 117.8 LBS | OXYGEN SATURATION: 95 %

## 2024-05-28 VITALS
TEMPERATURE: 98.6 F | HEART RATE: 63 BPM | HEIGHT: 66 IN | DIASTOLIC BLOOD PRESSURE: 73 MMHG | SYSTOLIC BLOOD PRESSURE: 128 MMHG | WEIGHT: 118 LBS | BODY MASS INDEX: 18.96 KG/M2

## 2024-05-28 DIAGNOSIS — Z79.4 DIABETES MELLITUS DUE TO UNDERLYING CONDITION WITH DIABETIC NEUROPATHY, WITH LONG-TERM CURRENT USE OF INSULIN (HCC): Primary | ICD-10-CM

## 2024-05-28 DIAGNOSIS — E11.21 TYPE 2 DIABETES WITH NEPHROPATHY (HCC): ICD-10-CM

## 2024-05-28 DIAGNOSIS — R10.31 RIGHT LOWER QUADRANT ABDOMINAL PAIN: Primary | ICD-10-CM

## 2024-05-28 DIAGNOSIS — E08.40 DIABETES MELLITUS DUE TO UNDERLYING CONDITION WITH DIABETIC NEUROPATHY, WITH LONG-TERM CURRENT USE OF INSULIN (HCC): ICD-10-CM

## 2024-05-28 DIAGNOSIS — K40.90 INGUINAL HERNIA OF RIGHT SIDE WITHOUT OBSTRUCTION OR GANGRENE: Primary | ICD-10-CM

## 2024-05-28 DIAGNOSIS — E08.40 DIABETES MELLITUS DUE TO UNDERLYING CONDITION WITH DIABETIC NEUROPATHY, WITH LONG-TERM CURRENT USE OF INSULIN (HCC): Primary | ICD-10-CM

## 2024-05-28 DIAGNOSIS — K57.20 PERFORATED DIVERTICULUM OF LARGE INTESTINE: ICD-10-CM

## 2024-05-28 DIAGNOSIS — D47.02 SYSTEMIC MASTOCYTOSIS: ICD-10-CM

## 2024-05-28 DIAGNOSIS — Z79.4 DIABETES MELLITUS DUE TO UNDERLYING CONDITION WITH DIABETIC NEUROPATHY, WITH LONG-TERM CURRENT USE OF INSULIN (HCC): ICD-10-CM

## 2024-05-28 DIAGNOSIS — J43.1 PANLOBULAR EMPHYSEMA (HCC): ICD-10-CM

## 2024-05-28 PROCEDURE — 1036F TOBACCO NON-USER: CPT | Performed by: FAMILY MEDICINE

## 2024-05-28 PROCEDURE — G8420 CALC BMI NORM PARAMETERS: HCPCS | Performed by: FAMILY MEDICINE

## 2024-05-28 PROCEDURE — G8427 DOCREV CUR MEDS BY ELIG CLIN: HCPCS | Performed by: FAMILY MEDICINE

## 2024-05-28 PROCEDURE — 1123F ACP DISCUSS/DSCN MKR DOCD: CPT | Performed by: FAMILY MEDICINE

## 2024-05-28 PROCEDURE — 3023F SPIROM DOC REV: CPT | Performed by: FAMILY MEDICINE

## 2024-05-28 PROCEDURE — G8427 DOCREV CUR MEDS BY ELIG CLIN: HCPCS | Performed by: SURGERY

## 2024-05-28 PROCEDURE — G8420 CALC BMI NORM PARAMETERS: HCPCS | Performed by: SURGERY

## 2024-05-28 PROCEDURE — 99204 OFFICE O/P NEW MOD 45 MIN: CPT | Performed by: SURGERY

## 2024-05-28 PROCEDURE — 1036F TOBACCO NON-USER: CPT | Performed by: SURGERY

## 2024-05-28 PROCEDURE — 99214 OFFICE O/P EST MOD 30 MIN: CPT | Performed by: FAMILY MEDICINE

## 2024-05-28 PROCEDURE — 1123F ACP DISCUSS/DSCN MKR DOCD: CPT | Performed by: SURGERY

## 2024-05-28 RX ORDER — INSULIN GLARGINE 100 [IU]/ML
7 INJECTION, SOLUTION SUBCUTANEOUS 2 TIMES DAILY
Qty: 15 ADJUSTABLE DOSE PRE-FILLED PEN SYRINGE | Refills: 3 | Status: SHIPPED | OUTPATIENT
Start: 2024-05-28

## 2024-05-28 NOTE — PROGRESS NOTES
Tony Calle is a 88 y.o. male (: 1935) presenting to address:    Chief Complaint   Patient presents with    New Patient     Right inguinal hernia/referred by Dr. Fairbanks       Medication list and allergies have been reviewed with Tony Calle and updated as of today's date.     I have gone over all Medical, Surgical and Social History with Tony Calle and updated/added the information accordingly.         
capsule Take 1 capsule by mouth Daily    dapagliflozin (FARXIGA) 10 MG tablet Take 1 tablet by mouth every morning    Budeson-Glycopyrrol-Formoterol (BREZTRI AEROSPHERE) 160-9-4.8 MCG/ACT AERO Inhale 1 puff into the lungs in the morning and at bedtime    vitamin B-12 (CYANOCOBALAMIN) 1000 MCG tablet Take 1 tablet by mouth daily    ondansetron (ZOFRAN-ODT) 4 MG disintegrating tablet Take 1 tablet by mouth 3 times daily (with meals)    JANUVIA 25 MG tablet Take 1 tablet by mouth daily    ASPIRIN LOW DOSE 81 MG chewable tablet Take 1 tablet by mouth daily chew and swallow    Continuous Blood Gluc  (FREESTYLE BEVERLEY 3 READER) MILTON 1 each by Does not apply route once    Continuous Blood Gluc Sensor (FREESTYLE BEVERLEY 3 SENSOR) MISC 1 each by Does not apply route every 14 days    sildenafil (VIAGRA) 100 MG tablet Take 1 tablet by mouth as needed for Erectile Dysfunction    TRUE METRIX BLOOD GLUCOSE TEST strip use 1 TEST STRIP to TEST BLOOD SUGAR three times a day    Glucagon (GVOKE HYPOPEN 1-PACK) 0.5 MG/0.1ML SOAJ Inject 1 pen  into the skin as needed (hypoglycemia)    empagliflozin (JARDIANCE) 25 MG tablet Take 1 tablet by mouth daily    albuterol sulfate HFA (PROVENTIL;VENTOLIN;PROAIR) 108 (90 Base) MCG/ACT inhaler Inhale 1 puff into the lungs every 6 hours as needed for Shortness of Breath or Wheezing     No current facility-administered medications for this visit.      Allergies   Allergen Reactions    Sulfamethoxazole-Trimethoprim Other (See Comments)     Patient reports sweat, faint and unconscious for several hours when came to he had a bowel movement all over himself after taking this abx         Review of Systems:  Pertinent items are noted in the History of Present Illness.    Objective:     /73 (Site: Right Upper Arm, Position: Sitting, Cuff Size: Small Adult)   Pulse 63   Temp 98.6 °F (37 °C) (Temporal)   Ht 1.676 m (5' 6\")   Wt 53.5 kg (118 lb)   BMI 19.05 kg/m²     Physical Exam:

## 2024-05-28 NOTE — PROGRESS NOTES
Tony Calle is a 88 y.o. year old male who presents today for   Chief Complaint   Patient presents with    Abdominal Pain       Is someone accompanying this pt? Yes    Is the patient using any DME equipment during OV? Yes, Cane    Depression Screenin/25/2024    11:05 AM 2024     9:26 AM 2023     8:55 AM 2023     1:49 PM 2023     2:45 PM 10/3/2022    11:53 AM 2022    11:57 AM   PHQ-9 Questionaire   Little interest or pleasure in doing things 0 0 0 0 0 0 0   Feeling down, depressed, or hopeless 0 0 0 0 0 0 0   Trouble falling or staying asleep, or sleeping too much 0 0 0       Feeling tired or having little energy 0 0 0       Poor appetite or overeating 0 0 0       Feeling bad about yourself - or that you are a failure or have let yourself or your family down 0 0 0       Trouble concentrating on things, such as reading the newspaper or watching television 0 0 0       Moving or speaking so slowly that other people could have noticed. Or the opposite - being so fidgety or restless that you have been moving around a lot more than usual 0 0 0       Thoughts that you would be better off dead, or of hurting yourself in some way 0 0 0       PHQ-9 Total Score 0 0 0 0 0 0 0   If you checked off any problems, how difficult have these problems made it for you to do your work, take care of things at home, or get along with other people? 0 0 0           Abuse Screening:       No data to display                Learning Assessment:  No question data found.    Fall Risk:      2023     8:55 AM 2023     1:39 PM 2023     2:45 PM   Fall Risk   2 or more falls in past year? no no no   Fall with injury in past year? no no no           Coordination of Care:   1. \"Have you been to the ER, urgent care clinic since your last visit?  Hospitalized since your last visit?\" No    2. \"Have you seen or consulted any other health care providers outside of the [facility] since your last

## 2024-05-28 NOTE — PROGRESS NOTES
Pharmacy Progress Note - Diabetes Management Follow up       Assessment / Plan:   Diabetes Management:  Per PCP referral, Pt's A1c is at goal of < 8%.  Patient's most recent POC A1c was 7.3% on 4/25/24, which has significantly improved from previous A1c of 12.7% on 2/13/24. Patient presents to appointment with girlfriend. Patient is having issues with his sensors. The last 2 he has applied have malfunctioned. Removed the current sensor on patient's arm and the filament was bent. Provided a sample of Alin 2 that was given to patient, applied on the back of the upper left arm, and activated to initiate 60 minute warm up phase. Provided patient with Alin customer service number to request 2 replacement sensors since the filament was bent on both and wouldn't read. Patient reports to be doing well, taking medications daily, and eating appropriate foods. His A1c is now at goal,  no changes made to medication regimen at this time. Removed Levemir from medication list, as he has completed his supply and updated his dose on the Lantus directions. Patient will follow up in 3 months after PCP appointment.      Medications Discontinued During This Encounter   Medication Reason    insulin detemir (LEVEMIR) 100 UNIT/ML injection pen Therapy completed    insulin glargine (LANTUS SOLOSTAR) 100 UNIT/ML injection pen DOSE ADJUSTMENT        Orders Placed This Encounter    insulin glargine (LANTUS SOLOSTAR) 100 UNIT/ML injection pen     Sig: Inject 7 Units into the skin 2 times daily     Dispense:  15 Adjustable Dose Pre-filled Pen Syringe     Refill:  3        Patient will return to clinic in 3 month(s) for follow up.        S/O: Mr. Tony Calle, a 88 y.o. male referred by Timo Fairbanks MD,  has a past medical history of Abdominal pain, Acute cystitis with hematuria, Bladder mass, Bladder pain, BPH (benign prostatic hyperplasia), BPH with obstruction/lower urinary tract symptoms, Chronic prostatitis, Diabetes (HCC), ED

## 2024-05-28 NOTE — PROGRESS NOTES
Tony Calle is a 88 y.o.  male and presents with    Chief Complaint   Patient presents with    Abdominal Pain       Subjective:  Mr. Calle c/o abdominal pain which started a week ago.  He has swelling on the right side.  It itches  and is painful; he has h/o hernia on the left and this is similar.      He has diabetes; he is using insulin as prescribed; he has on his CGM average 147 over the past month.    He has emphysema but trelegy gives him nausea and he vomits.  He has improved thirst and urinary frequency.       ROS   Constitutional:  Negative for activity change and appetite change. + weight gain  HENT:  Negative for sinus pressure, sinus pain, sneezing and sore throat.    Respiratory:  Negative for cough and shortness of breath.    Cardiovascular:  Negative for chest pain, palpitations and leg swelling.   Gastrointestinal: No constipation, diarrhea, nausea or vomiting.   Endocrine: negative for polydipsia or polyuria.  Genitourinary:  Negative for difficulty urinating and dysuria.   Neurological:  Negative for dizziness, syncope, light-headedness and headaches.    All other systems reviewed and are negative.    Objective:  Vitals:    05/28/24 0824   BP: 119/69   Pulse: 70   Resp: 14   Temp: 97.8 °F (36.6 °C)   SpO2: 95%         alert, well appearing, and in no distress, oriented to person, place, and time, and normal appearing weight  Mental status - normal mood, behavior, speech, dress, motor activity, and thought processes  Abdomen - tenderness noted right side lower abdomen  no rebound tenderness noted  HERNIA EXAM: right inguinal hernia which reduces when supine  Neurological - cranial nerves II through XII intact    LABS   Hgb A1c 7.3  TESTS      Assessment/Plan:    1. Inguinal hernia of right side without obstruction or gangrene  Refer for eval and treatment; emergency precautions reviewed  - [facility] - Jennifer Manning MD, General Surgery, [place]    2. Diabetes mellitus due to

## 2024-05-29 ENCOUNTER — TELEPHONE (OUTPATIENT)
Age: 89
End: 2024-05-29

## 2024-05-29 NOTE — TELEPHONE ENCOUNTER
Left voicemail message to inform of appointment with MERARY [facility] scheduled on Monday, Michelle 3, 2024 at 1:20pm/1:00pm arrival at [address] [place], [place] [address] (t) [phone]

## 2024-05-29 NOTE — TELEPHONE ENCOUNTER
Spoke to Ms. Cruz to inform of urology appointment on Monday, Michelle 3, 2024 at 1:20pm/1:00pm arrival with [facility].  I explained I also left a voicemail on Mr. Calle mobile phone to inform him of the appointment.  Ms. Cruz states she'll relay the message and indicate they're aware of the office location.

## 2024-06-24 DIAGNOSIS — E11.65 TYPE 2 DIABETES MELLITUS WITH HYPERGLYCEMIA, WITH LONG-TERM CURRENT USE OF INSULIN (HCC): Primary | ICD-10-CM

## 2024-06-24 DIAGNOSIS — Z79.4 TYPE 2 DIABETES MELLITUS WITH HYPERGLYCEMIA, WITH LONG-TERM CURRENT USE OF INSULIN (HCC): Primary | ICD-10-CM

## 2024-06-25 ENCOUNTER — TELEPHONE (OUTPATIENT)
Facility: CLINIC | Age: 89
End: 2024-06-25

## 2024-06-25 NOTE — TELEPHONE ENCOUNTER
Pharmacy Progress Note - Telephone Call    Mr. Tony Calle 88 y.o. was contacted via an outbound telephone call today regarding Alin sensor. Timo Fairbanks MD's nurse states patient had an MRI a few days ago and had to remove his sensor. Patient did not answer, left VM for pt to return call back about Alin sensor.    Thank you,    Nilda Mckinnon, PharmD  Clinical Pharmacy Specialist  06/25/24    For Pharmacy Admin Tracking Only    Program: Medical Group  CPA in place:  Yes  Time Spent (min): 5

## 2024-06-27 RX ORDER — KETOROLAC TROMETHAMINE 30 MG/ML
1 INJECTION, SOLUTION INTRAMUSCULAR; INTRAVENOUS ONCE
Qty: 1 EACH | Refills: 0 | Status: SHIPPED | OUTPATIENT
Start: 2024-06-27 | End: 2024-06-27

## 2024-06-27 RX ORDER — BLOOD-GLUCOSE SENSOR
1 EACH MISCELLANEOUS
Qty: 6 EACH | Refills: 5 | Status: SHIPPED | OUTPATIENT
Start: 2024-06-27

## 2024-06-27 RX ORDER — PEN NEEDLE, DIABETIC 32GX 5/32"
1 NEEDLE, DISPOSABLE MISCELLANEOUS 3 TIMES DAILY
Qty: 200 EACH | Refills: 5 | Status: SHIPPED | OUTPATIENT
Start: 2024-06-27

## 2024-07-05 ENCOUNTER — TELEPHONE (OUTPATIENT)
Facility: CLINIC | Age: 89
End: 2024-07-05

## 2024-07-05 NOTE — TELEPHONE ENCOUNTER
Called pt back @ [phone], he gave permission to speak to Miss Maldonado. Advised her that this nurse spoke w/ Pharmacist Bess @ [facility] [address] And was told that needles were on hold d/t error message:fill too soon, d/t pt already receiving needles in May from somewhere else. Advised her that per Pharmacist pt will have to contact [facility] ph# [phone] and discontinue mail order for insulin needles. Also advised her that [facility] has a RX ready for p/u today to get Mr. Calle started and will have a full RX ready on Monday 7/8/2024. Miss Maldonado verbalized understanding of all information given and explained it to Mr. Calle. Stated she would help him call mail-order pharmacy to help get insulin needles dc'd through them.

## 2024-07-05 NOTE — TELEPHONE ENCOUNTER
Rec'd t/c from Tony Calle gave permission for friend Miss Maldonado to help w/call. Per Mr. Calle the insulin needles he had for his Lantus are too large, recently turned them in to the office. He received the insulin needles via mail order back in May. Stated that he needs to use smaller needles. Advised him that this nurse will call the pharmacy and find out what size needles he needs to administer his insulin and see if they are able to be reordered for him. Verified that his pharmacy is [facility] @ [address] [place] [address]  Advised him that this nurse will give him a call back to let him know the outcome. He verbalized understanding, stated okay.    Contacted [facility] pharmacy, spoke w/Pharmacist Bess, was advised that needs are on hold d/t error message: fill too soon b/c pt already received needles in May from somewhere else. Advised her that pt turned those needles in to the office d/t stating they were too large. Per Bess pt needs the GAYLE 5/32 inch 32 gauge needles. The other needles are scheduled to be auto-filled again via mail order in August. Pt will need to d/c the mail order. Per Bess she was able to do an override for the correct needles. Can give the pt some needles to get thru the wkend until order comes in. Advised her that this nurse will let the pt know the details of what she said during this call.

## 2024-07-06 DIAGNOSIS — E11.65 TYPE 2 DIABETES MELLITUS WITH HYPERGLYCEMIA, WITH LONG-TERM CURRENT USE OF INSULIN (HCC): ICD-10-CM

## 2024-07-06 DIAGNOSIS — Z79.4 TYPE 2 DIABETES MELLITUS WITH HYPERGLYCEMIA, WITH LONG-TERM CURRENT USE OF INSULIN (HCC): ICD-10-CM

## 2024-07-08 RX ORDER — DAPAGLIFLOZIN 10 MG/1
10 TABLET, FILM COATED ORAL DAILY
Qty: 90 TABLET | Refills: 3 | OUTPATIENT
Start: 2024-07-08

## 2024-07-18 ENCOUNTER — TELEPHONE (OUTPATIENT)
Facility: CLINIC | Age: 89
End: 2024-07-18

## 2024-07-18 DIAGNOSIS — E11.65 TYPE 2 DIABETES MELLITUS WITH HYPERGLYCEMIA, WITH LONG-TERM CURRENT USE OF INSULIN (HCC): Primary | ICD-10-CM

## 2024-07-18 DIAGNOSIS — Z79.4 TYPE 2 DIABETES MELLITUS WITH HYPERGLYCEMIA, WITH LONG-TERM CURRENT USE OF INSULIN (HCC): Primary | ICD-10-CM

## 2024-07-18 DIAGNOSIS — E11.65 TYPE 2 DIABETES MELLITUS WITH HYPERGLYCEMIA, WITH LONG-TERM CURRENT USE OF INSULIN (HCC): ICD-10-CM

## 2024-07-18 DIAGNOSIS — Z79.4 TYPE 2 DIABETES MELLITUS WITH HYPERGLYCEMIA, WITH LONG-TERM CURRENT USE OF INSULIN (HCC): ICD-10-CM

## 2024-07-18 NOTE — TELEPHONE ENCOUNTER
Pt walked into the office very upset. He requested his FreeStyle Alin 2 Glucose Sensor and he was issued the wrong one. He had to pay $25.00 out of pocket for a sensor he doesn't use. Please send in the correct sensor ASAP. Pt has been without for a few weeks now. He uses [facility] on [address].    LOV: 5/28/2024  NOV:  8/29/2024

## 2024-07-18 NOTE — TELEPHONE ENCOUNTER
I researched the chart and saw where Dr. Fairbanks wrote a script for a Freestyle Alin 3 [employer] on 6/27/2024. Pt states he didn't  that machine. He wants to keep his Freestyle Alin 2 [employer]. Can you please change RX to  Freestyle Alin 2 Sensors and call into [facility] ASAP.

## 2024-07-18 NOTE — TELEPHONE ENCOUNTER
I spoke with patient and he is aware that Freestyle Alin 2 Sensors were called into [facility] pharm.

## 2024-07-23 DIAGNOSIS — E11.65 TYPE 2 DIABETES MELLITUS WITH HYPERGLYCEMIA, WITH LONG-TERM CURRENT USE OF INSULIN (HCC): ICD-10-CM

## 2024-07-23 DIAGNOSIS — Z79.4 TYPE 2 DIABETES MELLITUS WITH HYPERGLYCEMIA, WITH LONG-TERM CURRENT USE OF INSULIN (HCC): ICD-10-CM

## 2024-07-29 RX ORDER — DAPAGLIFLOZIN 10 MG/1
10 TABLET, FILM COATED ORAL DAILY
Qty: 90 TABLET | Refills: 3 | Status: SHIPPED | OUTPATIENT
Start: 2024-07-29

## 2024-08-26 DIAGNOSIS — Z79.4 TYPE 2 DIABETES MELLITUS WITH HYPOGLYCEMIA WITHOUT COMA, WITH LONG-TERM CURRENT USE OF INSULIN (HCC): ICD-10-CM

## 2024-08-26 DIAGNOSIS — E11.649 TYPE 2 DIABETES MELLITUS WITH HYPOGLYCEMIA WITHOUT COMA, WITH LONG-TERM CURRENT USE OF INSULIN (HCC): ICD-10-CM

## 2024-08-29 ENCOUNTER — PHARMACY VISIT (OUTPATIENT)
Facility: CLINIC | Age: 89
End: 2024-08-29

## 2024-08-29 ENCOUNTER — OFFICE VISIT (OUTPATIENT)
Facility: CLINIC | Age: 89
End: 2024-08-29

## 2024-08-29 VITALS
WEIGHT: 113.4 LBS | BODY MASS INDEX: 18.23 KG/M2 | HEIGHT: 66 IN | TEMPERATURE: 98 F | DIASTOLIC BLOOD PRESSURE: 79 MMHG | RESPIRATION RATE: 17 BRPM | OXYGEN SATURATION: 97 % | SYSTOLIC BLOOD PRESSURE: 124 MMHG | HEART RATE: 76 BPM

## 2024-08-29 DIAGNOSIS — Z28.21 REFUSED INFLUENZA VACCINE: ICD-10-CM

## 2024-08-29 DIAGNOSIS — Z71.89 ADVANCE CARE PLANNING: ICD-10-CM

## 2024-08-29 DIAGNOSIS — F33.41 RECURRENT MAJOR DEPRESSIVE DISORDER, IN PARTIAL REMISSION (HCC): ICD-10-CM

## 2024-08-29 DIAGNOSIS — E11.649 TYPE 2 DIABETES MELLITUS WITH HYPOGLYCEMIA WITHOUT COMA, WITH LONG-TERM CURRENT USE OF INSULIN (HCC): Primary | ICD-10-CM

## 2024-08-29 DIAGNOSIS — Z00.00 MEDICARE ANNUAL WELLNESS VISIT, SUBSEQUENT: Primary | ICD-10-CM

## 2024-08-29 DIAGNOSIS — L85.3 DRY SKIN: ICD-10-CM

## 2024-08-29 DIAGNOSIS — E53.8 VITAMIN B 12 DEFICIENCY: ICD-10-CM

## 2024-08-29 DIAGNOSIS — J43.1 PANLOBULAR EMPHYSEMA (HCC): ICD-10-CM

## 2024-08-29 DIAGNOSIS — Z28.21 REFUSED PNEUMOCOCCAL VACCINATION: ICD-10-CM

## 2024-08-29 DIAGNOSIS — Z23 NEED FOR SHINGLES VACCINE: ICD-10-CM

## 2024-08-29 DIAGNOSIS — Z79.4 TYPE 2 DIABETES MELLITUS WITH HYPOGLYCEMIA WITHOUT COMA, WITH LONG-TERM CURRENT USE OF INSULIN (HCC): Primary | ICD-10-CM

## 2024-08-29 DIAGNOSIS — E11.649 TYPE 2 DIABETES MELLITUS WITH HYPOGLYCEMIA WITHOUT COMA, WITH LONG-TERM CURRENT USE OF INSULIN (HCC): ICD-10-CM

## 2024-08-29 DIAGNOSIS — Z79.4 TYPE 2 DIABETES MELLITUS WITH HYPOGLYCEMIA WITHOUT COMA, WITH LONG-TERM CURRENT USE OF INSULIN (HCC): ICD-10-CM

## 2024-08-29 RX ORDER — EMPAGLIFLOZIN 25 MG/1
25 TABLET, FILM COATED ORAL DAILY
Qty: 90 TABLET | Refills: 3 | OUTPATIENT
Start: 2024-08-29

## 2024-08-29 SDOH — ECONOMIC STABILITY: INCOME INSECURITY: HOW HARD IS IT FOR YOU TO PAY FOR THE VERY BASICS LIKE FOOD, HOUSING, MEDICAL CARE, AND HEATING?: NOT VERY HARD

## 2024-08-29 SDOH — ECONOMIC STABILITY: FOOD INSECURITY: WITHIN THE PAST 12 MONTHS, THE FOOD YOU BOUGHT JUST DIDN'T LAST AND YOU DIDN'T HAVE MONEY TO GET MORE.: NEVER TRUE

## 2024-08-29 SDOH — ECONOMIC STABILITY: FOOD INSECURITY: WITHIN THE PAST 12 MONTHS, YOU WORRIED THAT YOUR FOOD WOULD RUN OUT BEFORE YOU GOT MONEY TO BUY MORE.: NEVER TRUE

## 2024-08-29 ASSESSMENT — ANXIETY QUESTIONNAIRES
6. BECOMING EASILY ANNOYED OR IRRITABLE: NOT AT ALL
2. NOT BEING ABLE TO STOP OR CONTROL WORRYING: NOT AT ALL
GAD7 TOTAL SCORE: 0
IF YOU CHECKED OFF ANY PROBLEMS ON THIS QUESTIONNAIRE, HOW DIFFICULT HAVE THESE PROBLEMS MADE IT FOR YOU TO DO YOUR WORK, TAKE CARE OF THINGS AT HOME, OR GET ALONG WITH OTHER PEOPLE: NOT DIFFICULT AT ALL
3. WORRYING TOO MUCH ABOUT DIFFERENT THINGS: NOT AT ALL
5. BEING SO RESTLESS THAT IT IS HARD TO SIT STILL: NOT AT ALL
4. TROUBLE RELAXING: NOT AT ALL
7. FEELING AFRAID AS IF SOMETHING AWFUL MIGHT HAPPEN: NOT AT ALL
1. FEELING NERVOUS, ANXIOUS, OR ON EDGE: NOT AT ALL

## 2024-08-29 ASSESSMENT — PATIENT HEALTH QUESTIONNAIRE - PHQ9
SUM OF ALL RESPONSES TO PHQ QUESTIONS 1-9: 0
SUM OF ALL RESPONSES TO PHQ9 QUESTIONS 1 & 2: 0
10. IF YOU CHECKED OFF ANY PROBLEMS, HOW DIFFICULT HAVE THESE PROBLEMS MADE IT FOR YOU TO DO YOUR WORK, TAKE CARE OF THINGS AT HOME, OR GET ALONG WITH OTHER PEOPLE: NOT DIFFICULT AT ALL
3. TROUBLE FALLING OR STAYING ASLEEP: NOT AT ALL
SUM OF ALL RESPONSES TO PHQ QUESTIONS 1-9: 0
6. FEELING BAD ABOUT YOURSELF - OR THAT YOU ARE A FAILURE OR HAVE LET YOURSELF OR YOUR FAMILY DOWN: NOT AT ALL
4. FEELING TIRED OR HAVING LITTLE ENERGY: NOT AT ALL
5. POOR APPETITE OR OVEREATING: NOT AT ALL
7. TROUBLE CONCENTRATING ON THINGS, SUCH AS READING THE NEWSPAPER OR WATCHING TELEVISION: NOT AT ALL
9. THOUGHTS THAT YOU WOULD BE BETTER OFF DEAD, OR OF HURTING YOURSELF: NOT AT ALL
SUM OF ALL RESPONSES TO PHQ QUESTIONS 1-9: 0
SUM OF ALL RESPONSES TO PHQ QUESTIONS 1-9: 0
1. LITTLE INTEREST OR PLEASURE IN DOING THINGS: NOT AT ALL
8. MOVING OR SPEAKING SO SLOWLY THAT OTHER PEOPLE COULD HAVE NOTICED. OR THE OPPOSITE, BEING SO FIGETY OR RESTLESS THAT YOU HAVE BEEN MOVING AROUND A LOT MORE THAN USUAL: NOT AT ALL
2. FEELING DOWN, DEPRESSED OR HOPELESS: NOT AT ALL

## 2024-08-29 ASSESSMENT — LIFESTYLE VARIABLES
HOW MANY STANDARD DRINKS CONTAINING ALCOHOL DO YOU HAVE ON A TYPICAL DAY: 1 OR 2
HOW OFTEN DO YOU HAVE A DRINK CONTAINING ALCOHOL: 2-3 TIMES A WEEK

## 2024-08-29 NOTE — PROGRESS NOTES
Pharmacy Progress Note - Diabetes Management Follow up       Assessment / Plan:   Diabetes Management:  Per PCP referral, Pt's A1c is at goal of < 8%.  Patient's most recent POC A1c was 7.3% on 4/25/24, which has significantly improved from previous A1c of 12.7% on 2/13/24. Patient was switched from Jardiance to Farxiga and seems to be tolerating well, as he denied any side effects with medications. He did not bring his reader to appointment today, but reports his BG ranges from 145-178 mg/dL. Attempted to perform POC A1c today to check progress, however pt declined stating he has the sensor on my arm so he don't have to poke his finger because it hurts and would not allow me to perform POC test because it's a fingerstick. Patient will get new A1c the next time he gets labs drawn. No changes made to medication regimen at this time. Pt will return in 2 months to assess CGM data.     Medication changes today:  - No changes   - Continue Farxiga 10 mg daily   - Continue Lantus 7 units BID  - Continue Januvia 100 mg daily     There are no discontinued medications.     No orders of the defined types were placed in this encounter.       Patient will return to clinic in 8 week(s) for follow up.        S/O: Mr. Tony Calle, a 89 y.o. male referred by Timo Fairbanks MD,  has a past medical history of Abdominal pain, Acute cystitis with hematuria, Bladder mass, Bladder pain, BPH (benign prostatic hyperplasia), BPH with obstruction/lower urinary tract symptoms, Chronic prostatitis, Diabetes (HCC), ED (erectile dysfunction), Elevated PSA, Essential hypertension, Hematuria, History of UTI, Hypercholesterolemia, Nocturia, Urinary tract infection, site not specified, and Urine leukocytes.  Pt was seen today for diabetes management.      In the past, the patient has tried the following:   He doesn't think that he's tried anything else.   Has been on insulin 1-2 years.      Patient denies personal/family history of thyroid cancer,

## 2024-09-13 ENCOUNTER — CARE COORDINATION (OUTPATIENT)
Facility: CLINIC | Age: 89
End: 2024-09-13

## 2024-09-16 ENCOUNTER — OFFICE VISIT (OUTPATIENT)
Facility: CLINIC | Age: 89
End: 2024-09-16

## 2024-09-16 VITALS
OXYGEN SATURATION: 95 % | BODY MASS INDEX: 18 KG/M2 | DIASTOLIC BLOOD PRESSURE: 73 MMHG | RESPIRATION RATE: 17 BRPM | WEIGHT: 112 LBS | HEIGHT: 66 IN | SYSTOLIC BLOOD PRESSURE: 129 MMHG | HEART RATE: 61 BPM | TEMPERATURE: 97.8 F

## 2024-09-16 DIAGNOSIS — Z09 HOSPITAL DISCHARGE FOLLOW-UP: Primary | ICD-10-CM

## 2024-09-16 DIAGNOSIS — E53.8 VITAMIN B 12 DEFICIENCY: ICD-10-CM

## 2024-09-16 DIAGNOSIS — R11.2 NAUSEA AND VOMITING, UNSPECIFIED VOMITING TYPE: ICD-10-CM

## 2024-09-16 DIAGNOSIS — Z79.4 TYPE 2 DIABETES MELLITUS WITH HYPOGLYCEMIA WITHOUT COMA, WITH LONG-TERM CURRENT USE OF INSULIN (HCC): ICD-10-CM

## 2024-09-16 DIAGNOSIS — E11.649 TYPE 2 DIABETES MELLITUS WITH HYPOGLYCEMIA WITHOUT COMA, WITH LONG-TERM CURRENT USE OF INSULIN (HCC): ICD-10-CM

## 2024-09-16 DIAGNOSIS — J43.1 PANLOBULAR EMPHYSEMA (HCC): ICD-10-CM

## 2024-09-16 RX ORDER — LANOLIN ALCOHOL/MO/W.PET/CERES
1000 CREAM (GRAM) TOPICAL DAILY
Qty: 30 TABLET | Refills: 5 | Status: SHIPPED | OUTPATIENT
Start: 2024-09-16

## 2024-09-16 SDOH — ECONOMIC STABILITY: FOOD INSECURITY: WITHIN THE PAST 12 MONTHS, YOU WORRIED THAT YOUR FOOD WOULD RUN OUT BEFORE YOU GOT MONEY TO BUY MORE.: NEVER TRUE

## 2024-09-16 SDOH — ECONOMIC STABILITY: FOOD INSECURITY: WITHIN THE PAST 12 MONTHS, THE FOOD YOU BOUGHT JUST DIDN'T LAST AND YOU DIDN'T HAVE MONEY TO GET MORE.: NEVER TRUE

## 2024-09-16 SDOH — ECONOMIC STABILITY: INCOME INSECURITY: HOW HARD IS IT FOR YOU TO PAY FOR THE VERY BASICS LIKE FOOD, HOUSING, MEDICAL CARE, AND HEATING?: NOT VERY HARD

## 2024-09-16 ASSESSMENT — PATIENT HEALTH QUESTIONNAIRE - PHQ9
4. FEELING TIRED OR HAVING LITTLE ENERGY: NOT AT ALL
SUM OF ALL RESPONSES TO PHQ QUESTIONS 1-9: 0
SUM OF ALL RESPONSES TO PHQ QUESTIONS 1-9: 0
7. TROUBLE CONCENTRATING ON THINGS, SUCH AS READING THE NEWSPAPER OR WATCHING TELEVISION: NOT AT ALL
SUM OF ALL RESPONSES TO PHQ9 QUESTIONS 1 & 2: 0
5. POOR APPETITE OR OVEREATING: NOT AT ALL
SUM OF ALL RESPONSES TO PHQ QUESTIONS 1-9: 0
2. FEELING DOWN, DEPRESSED OR HOPELESS: NOT AT ALL
3. TROUBLE FALLING OR STAYING ASLEEP: NOT AT ALL
9. THOUGHTS THAT YOU WOULD BE BETTER OFF DEAD, OR OF HURTING YOURSELF: NOT AT ALL
10. IF YOU CHECKED OFF ANY PROBLEMS, HOW DIFFICULT HAVE THESE PROBLEMS MADE IT FOR YOU TO DO YOUR WORK, TAKE CARE OF THINGS AT HOME, OR GET ALONG WITH OTHER PEOPLE: NOT DIFFICULT AT ALL
1. LITTLE INTEREST OR PLEASURE IN DOING THINGS: NOT AT ALL
8. MOVING OR SPEAKING SO SLOWLY THAT OTHER PEOPLE COULD HAVE NOTICED. OR THE OPPOSITE, BEING SO FIGETY OR RESTLESS THAT YOU HAVE BEEN MOVING AROUND A LOT MORE THAN USUAL: NOT AT ALL
6. FEELING BAD ABOUT YOURSELF - OR THAT YOU ARE A FAILURE OR HAVE LET YOURSELF OR YOUR FAMILY DOWN: NOT AT ALL
SUM OF ALL RESPONSES TO PHQ QUESTIONS 1-9: 0

## 2024-09-18 ENCOUNTER — CARE COORDINATION (OUTPATIENT)
Facility: CLINIC | Age: 89
End: 2024-09-18

## 2024-09-20 ENCOUNTER — CARE COORDINATION (OUTPATIENT)
Facility: CLINIC | Age: 89
End: 2024-09-20

## 2024-09-20 DIAGNOSIS — R63.4 WEIGHT LOSS: Primary | ICD-10-CM

## 2024-09-20 RX ORDER — LACTOSE-REDUCED FOOD
1 LIQUID (ML) ORAL
Qty: 90 EACH | Refills: 5 | Status: SHIPPED | OUTPATIENT
Start: 2024-09-20

## 2024-09-27 ENCOUNTER — CARE COORDINATION (OUTPATIENT)
Facility: CLINIC | Age: 89
End: 2024-09-27

## 2024-10-01 ENCOUNTER — OFFICE VISIT (OUTPATIENT)
Facility: CLINIC | Age: 89
End: 2024-10-01
Payer: MEDICARE

## 2024-10-01 VITALS
DIASTOLIC BLOOD PRESSURE: 79 MMHG | TEMPERATURE: 98.1 F | RESPIRATION RATE: 17 BRPM | OXYGEN SATURATION: 100 % | HEART RATE: 83 BPM | HEIGHT: 66 IN | BODY MASS INDEX: 18.13 KG/M2 | SYSTOLIC BLOOD PRESSURE: 133 MMHG | WEIGHT: 112.8 LBS

## 2024-10-01 DIAGNOSIS — L85.3 DRY SKIN: ICD-10-CM

## 2024-10-01 DIAGNOSIS — J43.1 PANLOBULAR EMPHYSEMA (HCC): ICD-10-CM

## 2024-10-01 DIAGNOSIS — L50.9 URTICARIA: ICD-10-CM

## 2024-10-01 DIAGNOSIS — Z28.21 REFUSED INFLUENZA VACCINE: ICD-10-CM

## 2024-10-01 DIAGNOSIS — F17.210 NICOTINE DEPENDENCE, CIGARETTES, UNCOMPLICATED: ICD-10-CM

## 2024-10-01 DIAGNOSIS — E11.649 TYPE 2 DIABETES MELLITUS WITH HYPOGLYCEMIA WITHOUT COMA, WITH LONG-TERM CURRENT USE OF INSULIN (HCC): ICD-10-CM

## 2024-10-01 DIAGNOSIS — Z00.00 ANNUAL PHYSICAL EXAM: Primary | ICD-10-CM

## 2024-10-01 DIAGNOSIS — Z79.4 TYPE 2 DIABETES MELLITUS WITH HYPOGLYCEMIA WITHOUT COMA, WITH LONG-TERM CURRENT USE OF INSULIN (HCC): ICD-10-CM

## 2024-10-01 PROCEDURE — G8484 FLU IMMUNIZE NO ADMIN: HCPCS | Performed by: FAMILY MEDICINE

## 2024-10-01 PROCEDURE — 99397 PER PM REEVAL EST PAT 65+ YR: CPT | Performed by: FAMILY MEDICINE

## 2024-10-01 RX ORDER — KETOCONAZOLE 20 MG/G
CREAM TOPICAL
Qty: 30 G | Refills: 1 | Status: SHIPPED | OUTPATIENT
Start: 2024-10-01

## 2024-10-01 SDOH — ECONOMIC STABILITY: INCOME INSECURITY: HOW HARD IS IT FOR YOU TO PAY FOR THE VERY BASICS LIKE FOOD, HOUSING, MEDICAL CARE, AND HEATING?: NOT VERY HARD

## 2024-10-01 SDOH — ECONOMIC STABILITY: FOOD INSECURITY: WITHIN THE PAST 12 MONTHS, YOU WORRIED THAT YOUR FOOD WOULD RUN OUT BEFORE YOU GOT MONEY TO BUY MORE.: NEVER TRUE

## 2024-10-01 SDOH — ECONOMIC STABILITY: FOOD INSECURITY: WITHIN THE PAST 12 MONTHS, THE FOOD YOU BOUGHT JUST DIDN'T LAST AND YOU DIDN'T HAVE MONEY TO GET MORE.: NEVER TRUE

## 2024-10-01 ASSESSMENT — PATIENT HEALTH QUESTIONNAIRE - PHQ9
6. FEELING BAD ABOUT YOURSELF - OR THAT YOU ARE A FAILURE OR HAVE LET YOURSELF OR YOUR FAMILY DOWN: NOT AT ALL
4. FEELING TIRED OR HAVING LITTLE ENERGY: NOT AT ALL
9. THOUGHTS THAT YOU WOULD BE BETTER OFF DEAD, OR OF HURTING YOURSELF: NOT AT ALL
8. MOVING OR SPEAKING SO SLOWLY THAT OTHER PEOPLE COULD HAVE NOTICED. OR THE OPPOSITE, BEING SO FIGETY OR RESTLESS THAT YOU HAVE BEEN MOVING AROUND A LOT MORE THAN USUAL: NOT AT ALL
SUM OF ALL RESPONSES TO PHQ QUESTIONS 1-9: 0
5. POOR APPETITE OR OVEREATING: NOT AT ALL
10. IF YOU CHECKED OFF ANY PROBLEMS, HOW DIFFICULT HAVE THESE PROBLEMS MADE IT FOR YOU TO DO YOUR WORK, TAKE CARE OF THINGS AT HOME, OR GET ALONG WITH OTHER PEOPLE: NOT DIFFICULT AT ALL
SUM OF ALL RESPONSES TO PHQ QUESTIONS 1-9: 0
3. TROUBLE FALLING OR STAYING ASLEEP: NOT AT ALL
2. FEELING DOWN, DEPRESSED OR HOPELESS: NOT AT ALL
1. LITTLE INTEREST OR PLEASURE IN DOING THINGS: NOT AT ALL
7. TROUBLE CONCENTRATING ON THINGS, SUCH AS READING THE NEWSPAPER OR WATCHING TELEVISION: NOT AT ALL
SUM OF ALL RESPONSES TO PHQ9 QUESTIONS 1 & 2: 0

## 2024-10-01 NOTE — PROGRESS NOTES
Tony Calle is a 89 y.o. presents today for   Chief Complaint   Patient presents with    Medication Refill     Is someone accompanying this pt? no    Is the patient using any DME equipment during OV? no  There were no vitals filed for this visit.    Depression Screening:       10/1/2024    11:25 AM 2024     2:07 PM 2024     8:35 AM 2024    11:05 AM 2024     9:26 AM 2023     8:55 AM 2023     1:49 PM   PHQ-9 Questionaire   Little interest or pleasure in doing things 0 0 0 0 0 0 0   Feeling down, depressed, or hopeless 0 0 0 0 0 0 0   Trouble falling or staying asleep, or sleeping too much 0 0 0 0 0 0    Feeling tired or having little energy 0 0 0 0 0 0    Poor appetite or overeating 0 0 0 0 0 0    Feeling bad about yourself - or that you are a failure or have let yourself or your family down 0 0 0 0 0 0    Trouble concentrating on things, such as reading the newspaper or watching television 0 0 0 0 0 0    Moving or speaking so slowly that other people could have noticed. Or the opposite - being so fidgety or restless that you have been moving around a lot more than usual 0 0 0 0 0 0    Thoughts that you would be better off dead, or of hurting yourself in some way 0 0 0 0 0 0    PHQ-9 Total Score 0 0 0 0 0 0 0   If you checked off any problems, how difficult have these problems made it for you to do your work, take care of things at home, or get along with other people? 0 0 0 0 0 0         Abuse Screening:       No data to display                 Learning Assessment Screening:   No question data found.     Fall Risk Screenin/29/2024     8:43 AM 2023     8:55 AM 2023     1:39 PM 2023     2:45 PM   Fall Risk   Do you feel unsteady or are you worried about falling?  no no no no   2 or more falls in past year? no no no no   Fall with injury in past year? no no no no           Health Maintenance: reviewed and discussed and ordered per Provider.    Health Maintenance 
stools  Genito-Urinary ROS: negative for- dysuria, trouble voiding, or hematuria  Neurological: positive for neuropathy and stiff joints of upper extremities; negative for weakness    Patient's complete Health Risk Assessment and screening values have been reviewed and are found in Flowsheets. The following problems were reviewed today and where indicated follow up appointments were made and/or referrals ordered.    Positive Risk Factor Screenings with Interventions:                Inactivity:    (!) Abnormal       Interventions:  Exercise as tolerated     Abnormal BMI (underweight):  Body mass index is 18.21 kg/m². (!) Abnormal    Interventions:  Discussed dietary modifications to help improve weight while maintaining a healthy glucose control.           Tobacco Use:    Tobacco Use      Smoking status: Every Day        Packs/day: 0.25        Types: Cigarettes      Smokeless tobacco: Never     Interventions:  Patient comments: Pt is confident they are able to achieve smoking cessation independently.                       Objective   Vitals:    10/01/24 1127   BP: 133/79   Site: Right Upper Arm   Position: Sitting   Cuff Size: Small Adult   Pulse: 83   Resp: 17   Temp: 98.1 °F (36.7 °C)   TempSrc: Temporal   SpO2: 100%   Weight: 51.2 kg (112 lb 12.8 oz)   Height: 1.676 m (5' 6\")      Body mass index is 18.21 kg/m².     General appearance - alert, well appearing, and in no distress, oriented to person, place, and time  Mental status - normal mood, behavior, speech, dress, motor activity, and thought processes  Chest - clear to auscultation, no wheezes, rales or rhonchi, symmetric air entry  Heart - normal rate, regular rhythm, normal S1, S2, no murmurs, rubs, clicks or gallops  Neurological -  normal gait and station  Musculoskeletal - stiffness of b/l upper extremity and digits   Dermatologic: seborrheic keratosis and urticaria due to dry skin with no signs of rashes              Allergies   Allergen Reactions

## 2024-10-04 ENCOUNTER — CARE COORDINATION (OUTPATIENT)
Facility: CLINIC | Age: 89
End: 2024-10-04

## 2024-10-04 NOTE — CARE COORDINATION
Care Transitions Note    Follow Up Call     Patient Current Location:  Home: [facility] [address]   [address]  [place] [id number]    LPN Care Coordinator contacted the patient by telephone. Verified name and  as identifiers.    Additional needs identified to be addressed with provider   No needs identified                 Method of communication with provider: none.    Care Summary Note:   Spoke with patient.  Patient states things are fine.  Patient seen by pcp on 10/1/24.  Patient states he has no questions or concerns.      Advance Care Planning:   Does patient have an Advance Directive: reviewed during previous call, see note. .    Medication Review:  Medications changed since last call, reviewed today.     Assessments:  No changes since last call    Follow Up Appointment:   Reviewed upcoming appointment(s).  Future Appointments         Provider Specialty Dept Phone    10/24/2024 9:00 AM Nilda Mckinnon [facility] [phone]    2025 3:20 PM Chula Hong PA Urology [phone]            LPN Care Coordinator provided contact information.  Plan for follow-up call in 6-10 days based on severity of symptoms and risk factors.  Plan for next call:  final call      Sunita Miller LPN

## 2024-10-15 ENCOUNTER — CARE COORDINATION (OUTPATIENT)
Facility: CLINIC | Age: 89
End: 2024-10-15

## 2024-10-15 NOTE — CARE COORDINATION
Care Transitions Note    Final Call     Patient Current Location:  [place]    Care Transition Nurse contacted the patient by telephone. Verified name and  as identifiers.    Patient graduated from the Care Transitions program on 10- .  Patient/family has the ability to self manage at this time..        Handoff:   Patient was not referred to the ACM team due to no additional needs identified.       Care Summary Note:     Patient reports:   - He is doing very well   - He has a follow up appointment scheduled.        Upcoming Appointments:    Future Appointments         Provider Specialty Dept Phone    10/24/2024 9:00 AM Nilda Mckinnon, [facility] [phone]    2025 3:20 PM Chula Hong PA Urology [phone]            Patient has agreed to contact primary care provider and/or specialist for any further questions, concerns, or needs.    Laurie Calle RN

## 2024-10-24 ENCOUNTER — PHARMACY VISIT (OUTPATIENT)
Facility: CLINIC | Age: 89
End: 2024-10-24

## 2024-10-24 VITALS — WEIGHT: 111.6 LBS | BODY MASS INDEX: 18.01 KG/M2

## 2024-10-24 DIAGNOSIS — E11.649 TYPE 2 DIABETES MELLITUS WITH HYPOGLYCEMIA WITHOUT COMA, WITH LONG-TERM CURRENT USE OF INSULIN (HCC): Primary | ICD-10-CM

## 2024-10-24 DIAGNOSIS — Z79.4 TYPE 2 DIABETES MELLITUS WITH HYPOGLYCEMIA WITHOUT COMA, WITH LONG-TERM CURRENT USE OF INSULIN (HCC): Primary | ICD-10-CM

## 2024-10-24 NOTE — PROGRESS NOTES
Pharmacy Progress Note - Diabetes Management Follow up       Assessment / Plan:   Diabetes Management:  Per ADA guidelines, Pt's A1c is  at goal of < 7%.  Patient's most recent A1c was  7.7% on 9/10/24, which has slightly worsened from previous POC A1c of 7.3% on 4/25/24, however patient still remains at goal. Patient's BG is spiking with lunch, it is likely the ensure he is drinking is high in sugars. Recommended for pt to switch to Ensure Protein Max in the cartons because they have less sugar than the ensure in the bottles. Patient will continue regimen, no changes made. Instructed pt to try to scan sensor every 8 hours or 3 times daily to try to capture BG data. Follow up in 2 months.    Weight loss:  Pt states that no matter how much he eats, he cannot lose weight. Recommended for patient to try to increase his protein intake and eat a meal for lunch instead of just a protein drink to see if that will help increase his caloric intake enough for him to gain some weight.      There are no discontinued medications.     No orders of the defined types were placed in this encounter.       Patient will return to clinic in 8 week(s) for follow up.        S/O: Mr. Tony Calle, a 89 y.o. male referred by Timo Fairbanks MD,  has a past medical history of Abdominal pain, Acute cystitis with hematuria, Bladder mass, Bladder pain, BPH (benign prostatic hyperplasia), BPH with obstruction/lower urinary tract symptoms, Chronic prostatitis, Diabetes (HCC), ED (erectile dysfunction), Elevated PSA, Essential hypertension, Hematuria, History of UTI, Hypercholesterolemia, Nocturia, Urinary tract infection, site not specified, and Urine leukocytes.  Pt was seen today for diabetes management.      In the past, the patient has tried the following:   He doesn't think that he's tried anything else.   Has been on insulin 1-2 years.      Patient denies personal/family history of thyroid cancer, denies history of pancreatitis, and denies

## 2024-11-30 PROBLEM — I26.99 ACUTE PULMONARY EMBOLISM WITHOUT ACUTE COR PULMONALE, UNSPECIFIED PULMONARY EMBOLISM TYPE (HCC): Status: ACTIVE | Noted: 2024-11-30

## 2024-12-02 PROBLEM — E43 SEVERE PROTEIN-CALORIE MALNUTRITION (HCC): Status: ACTIVE | Noted: 2024-12-02

## 2024-12-11 ENCOUNTER — CARE COORDINATION (OUTPATIENT)
Facility: CLINIC | Age: 89
End: 2024-12-11

## 2024-12-11 NOTE — CARE COORDINATION
Care Transitions Note    Initial Call - Call within 2 business days of discharge: No Patient discharged to Skilled Nursing Facility.     Transition of care outreach postponed for 14 days due to patient's discharge to [facility] and rehab, SNF.

## 2024-12-23 ENCOUNTER — CARE COORDINATION (OUTPATIENT)
Facility: CLINIC | Age: 89
End: 2024-12-23

## 2024-12-23 NOTE — CARE COORDINATION
CTN attempt to reach nursing staff at [facility] and [facility]  to verify if Patient is still admitted to [facility] .Ctn spoke with Ms. Jean and she verified that Patient is still admitted into the facility.

## 2024-12-30 ENCOUNTER — CARE COORDINATION (OUTPATIENT)
Facility: CLINIC | Age: 89
End: 2024-12-30

## 2024-12-30 NOTE — CARE COORDINATION
CTN attempt to reach nursing staff at [facility]  to verify if Patient is still admitted to [facility] .Ctn spoke with Ms. Lopez  and she verified that Patient was discharged on 24.     Care Transitions Note    Initial Call - Call within 2 business days of discharge: No    Patient [place] Location[place]  [place]     Care Transition Nurse contacted the patient by telephone to perform post hospital discharge assessment, verified name and  as identifiers. Provided introduction to self, and explanation of the Care Transition Nurse role.     Patient: Tony Calle    Patient : 1935   MRN: [id number]    Reason for Admission: Syncope and collapse   Discharge Date: 12/10/24  RURS: Readmission Risk Score: 16.9      Last Discharge Facility       Date Complaint Diagnosis Description Type Department Provider    24 Loss of Consciousness Syncope and collapse ... ED to Hosp-Admission (Discharged) (ADMITTED) [id number] Amrit Araujo MD; Lawson Mi...            Was this an external facility discharge? No    Additional needs identified to be addressed with provider   Standard priority:        FYI- Patient recently admitted to [facility] on 24-12/10/24. Patient was discharged to [facility]. Pt.  was discharged to Home from [facility] on 24.  Upon med review with Pt. Significant other she mentioned that Patient is on Farxiga (not listed on file)  and not taking Eliquis. Pt. Significant other would also like to speak with you regarding Pt. Forgets his date and time at times. CTN reminded Pt. Significant other to bring all discharge paper works from SNF and also Pt. Medication bottles to Patient PCP appt. On 24. Pt. Significant other states that she will do so.          Method of communication with provider: chart routing.    Patients top risk factors for readmission:  recent hospital admission.     Interventions to

## 2025-01-06 ENCOUNTER — OFFICE VISIT (OUTPATIENT)
Facility: CLINIC | Age: 89
End: 2025-01-06
Payer: MEDICARE

## 2025-01-06 VITALS
DIASTOLIC BLOOD PRESSURE: 79 MMHG | BODY MASS INDEX: 17.58 KG/M2 | OXYGEN SATURATION: 93 % | SYSTOLIC BLOOD PRESSURE: 115 MMHG | TEMPERATURE: 97.7 F | HEIGHT: 67 IN | HEART RATE: 54 BPM | RESPIRATION RATE: 16 BRPM | WEIGHT: 112 LBS

## 2025-01-06 DIAGNOSIS — E11.21 TYPE 2 DIABETES WITH NEPHROPATHY (HCC): ICD-10-CM

## 2025-01-06 DIAGNOSIS — Z79.4 TYPE 2 DIABETES MELLITUS WITH HYPOGLYCEMIA WITHOUT COMA, WITH LONG-TERM CURRENT USE OF INSULIN (HCC): Primary | ICD-10-CM

## 2025-01-06 DIAGNOSIS — E11.649 TYPE 2 DIABETES MELLITUS WITH HYPOGLYCEMIA WITHOUT COMA, WITH LONG-TERM CURRENT USE OF INSULIN (HCC): Primary | ICD-10-CM

## 2025-01-06 DIAGNOSIS — J43.1 PANLOBULAR EMPHYSEMA (HCC): ICD-10-CM

## 2025-01-06 PROCEDURE — G8419 CALC BMI OUT NRM PARAM NOF/U: HCPCS | Performed by: FAMILY MEDICINE

## 2025-01-06 PROCEDURE — 1111F DSCHRG MED/CURRENT MED MERGE: CPT | Performed by: FAMILY MEDICINE

## 2025-01-06 PROCEDURE — G8427 DOCREV CUR MEDS BY ELIG CLIN: HCPCS | Performed by: FAMILY MEDICINE

## 2025-01-06 PROCEDURE — 3023F SPIROM DOC REV: CPT | Performed by: FAMILY MEDICINE

## 2025-01-06 PROCEDURE — 1124F ACP DISCUSS-NO DSCNMKR DOCD: CPT | Performed by: FAMILY MEDICINE

## 2025-01-06 PROCEDURE — 99214 OFFICE O/P EST MOD 30 MIN: CPT | Performed by: FAMILY MEDICINE

## 2025-01-06 PROCEDURE — 4004F PT TOBACCO SCREEN RCVD TLK: CPT | Performed by: FAMILY MEDICINE

## 2025-01-06 SDOH — ECONOMIC STABILITY: FOOD INSECURITY: WITHIN THE PAST 12 MONTHS, THE FOOD YOU BOUGHT JUST DIDN'T LAST AND YOU DIDN'T HAVE MONEY TO GET MORE.: NEVER TRUE

## 2025-01-06 SDOH — ECONOMIC STABILITY: INCOME INSECURITY: HOW HARD IS IT FOR YOU TO PAY FOR THE VERY BASICS LIKE FOOD, HOUSING, MEDICAL CARE, AND HEATING?: NOT VERY HARD

## 2025-01-06 SDOH — ECONOMIC STABILITY: FOOD INSECURITY: WITHIN THE PAST 12 MONTHS, YOU WORRIED THAT YOUR FOOD WOULD RUN OUT BEFORE YOU GOT MONEY TO BUY MORE.: NEVER TRUE

## 2025-01-06 ASSESSMENT — PATIENT HEALTH QUESTIONNAIRE - PHQ9
SUM OF ALL RESPONSES TO PHQ QUESTIONS 1-9: 0
9. THOUGHTS THAT YOU WOULD BE BETTER OFF DEAD, OR OF HURTING YOURSELF: NOT AT ALL
2. FEELING DOWN, DEPRESSED OR HOPELESS: NOT AT ALL
4. FEELING TIRED OR HAVING LITTLE ENERGY: NOT AT ALL
8. MOVING OR SPEAKING SO SLOWLY THAT OTHER PEOPLE COULD HAVE NOTICED. OR THE OPPOSITE, BEING SO FIGETY OR RESTLESS THAT YOU HAVE BEEN MOVING AROUND A LOT MORE THAN USUAL: NOT AT ALL
5. POOR APPETITE OR OVEREATING: NOT AT ALL
3. TROUBLE FALLING OR STAYING ASLEEP: NOT AT ALL
SUM OF ALL RESPONSES TO PHQ QUESTIONS 1-9: 0
6. FEELING BAD ABOUT YOURSELF - OR THAT YOU ARE A FAILURE OR HAVE LET YOURSELF OR YOUR FAMILY DOWN: NOT AT ALL
10. IF YOU CHECKED OFF ANY PROBLEMS, HOW DIFFICULT HAVE THESE PROBLEMS MADE IT FOR YOU TO DO YOUR WORK, TAKE CARE OF THINGS AT HOME, OR GET ALONG WITH OTHER PEOPLE: NOT DIFFICULT AT ALL
SUM OF ALL RESPONSES TO PHQ QUESTIONS 1-9: 0
7. TROUBLE CONCENTRATING ON THINGS, SUCH AS READING THE NEWSPAPER OR WATCHING TELEVISION: NOT AT ALL
SUM OF ALL RESPONSES TO PHQ9 QUESTIONS 1 & 2: 0
1. LITTLE INTEREST OR PLEASURE IN DOING THINGS: NOT AT ALL
SUM OF ALL RESPONSES TO PHQ QUESTIONS 1-9: 0

## 2025-01-06 NOTE — PROGRESS NOTES
Tony Calle is a 89 y.o. year old male who presents today for   Chief Complaint   Patient presents with    Follow-Up from Hospital        \"Have you been to the ER, urgent care clinic since your last visit?  Hospitalized since your last visit?\"   YES - When: approximately   ago.  Where and Why:  .     “Have you seen or consulted any other health care providers outside our system since your last visit?”   NO             Misty Shah [facility]  [address] [place] [address] [address]  Ph: [phone]  Direct Fax: [phone]

## 2025-01-06 NOTE — PROGRESS NOTES
Tony Calle is a 89 y.o.  male and presents with    Chief Complaint   Patient presents with    Follow-Up from Hospital     Admitted to [facility]  Discharged to rehab  Discharged to home    Subjective:  Mr. Calle presents with his daughter for follow up after hospitalization for hypoglycemia        Home glucose > 300; he is using lantus 5 units at bedtime.  He eats oatmeal and eggs.  He uses artificial sweetener.  ROS   General ROS: positive for  - fatigue and chills  negative for - fever  Psychological ROS: negative  Ophthalmic ROS: positive for - uses glasses  Endocrine ROS: positive for - hyperglycemia  Respiratory ROS: positive for - cough  Cardiovascular ROS: no chest pain or dyspnea on exertion  Gastrointestinal ROS: no abdominal pain, change in bowel habits, or black or bloody stools  Genito-Urinary ROS: no dysuria, trouble voiding, or hematuria  Musculoskeletal ROS: positive for - muscle pain  Neurological ROS: positive for - numbness/tingling  Dermatological ROS: negative for - rash    All other systems reviewed and are negative.      Objective:  Vitals:    01/06/25 0824   BP: 115/79   Pulse: 54   Resp: 16   Temp: 97.7 °F (36.5 °C)   SpO2: 93%         alert, well appearing, and in no distress, oriented to person, place, and time, and thin  Mental status - normal mood, behavior, speech, dress, motor activity, and thought processes  Chest - inspiratory rhonchi  Heart - normal rate, regular rhythm, normal S1, S2, no murmurs, rubs, clicks or gallops  Neurological - cranial nerves II through XII intact    LABS   Hgb a1c  TESTS      Assessment/Plan:    1. Type 2 diabetes mellitus with hypoglycemia without coma, with long-term current use of insulin (HCC)  Goal hgb A1c <7; encourage compliance with restarting insulin treatment and increasing for goal fasting glucose 100-130  - Hemoglobin A1C; Future    2. Panlobular emphysema (HCC)  Continue inhaled therapy    3. Type 2 diabetes with nephropathy

## 2025-01-08 ENCOUNTER — TELEPHONE (OUTPATIENT)
Facility: CLINIC | Age: 89
End: 2025-01-08

## 2025-01-15 ENCOUNTER — CARE COORDINATION (OUTPATIENT)
Facility: CLINIC | Age: 89
End: 2025-01-15

## 2025-01-15 NOTE — CARE COORDINATION
Care Transitions Note    Final Call     Patient Current Location:  Home: [facility] [address]   [address]  [place] [facility] [id number]    Care Transition Nurse contacted the patient by telephone. Verified name and  as identifiers.    Patient graduated from the Care Transitions program on 1/15/25.  Patient/family verbalizes confidence in the ability to self-manage at this time..      Handoff:   Patient was not referred to the ACM team due to no additional needs identified.       Care Summary Note:     Pt. Reports that he is doing okay.   Home health is following and Everything is good as per Pt. And Pt. Significant other.   Pt. Significant other reports PCP appt. Went well and medications were reviewed during appt.   All medications are good now as per Pt. Significant other.   Pt. Significant other reports that they can manage at home and have everything they need to keep Pt. Well at home.     No questions, concerns and/or needs at this time as per Pt. And Pt. Significant other.     Assessments:  Care Transitions Subsequent and Final Call    Subsequent and Final Calls  Do you have any ongoing symptoms?: No  Have your medications changed?: No  Do you have any questions related to your medications?: No  Do you currently have any active services?: Yes  Are you currently active with any services?: Home Health  Do you have any needs or concerns that I can assist you with?: No  Care Transitions Interventions  No Identified Needs  Other Interventions:              Upcoming Appointments:        Patient has agreed to contact primary care provider and/or specialist for any further questions, concerns, or needs.    Stefan Arellano RN

## 2025-01-15 NOTE — TELEPHONE ENCOUNTER
Medication requested :   Requested Prescriptions     Pending Prescriptions Disp Refills    BREO ELLIPTA 200-25 MCG/ACT AEPB inhaler [Pharmacy Med Name: BREO ELLIPTA 200-25 MCG 60 200-25 Aerosol]  10     Sig: INHALE 1 PUFF(S) BY MOUTH 1 TIMES PER DAY *NEW PRESCRIPTION REQUEST*    LANTUS SOLOSTAR 100 UNIT/ML injection pen [Pharmacy Med Name: LANTUS SOLOSTAR PEN 15ML 100 INSU] 15 mL 10     Sig: INJECT 15 UNITS SUBCUTANEOUSLY 1 TIMES PER DAY *NEW PRESCRIPTION REQUEST*    albuterol sulfate HFA (PROVENTIL;VENTOLIN;PROAIR) 108 (90 Base) MCG/ACT inhaler [Pharmacy Med Name: ALBUTEROL HFA *PROA* 90MCG 108 (90 BAS Aerosol] 8.5 g 10     Sig: INHALE 2 PUFF(S) BY MOUTH 4 TIMES PER DAY AS NEEDED. *NEW PRESCRIPTION REQUEST*    ELIQUIS 5 MG TABS tablet [Pharmacy Med Name: ELIQUIS 5 MG TABLET 5 Tablet] 60 tablet 10     Sig: TAKE 1 TABLET(S) BY MOUTH 2 TIMES PER DAY *NEW PRESCRIPTION REQUEST*      PCP: Timo Fairbanks MD  LOV:           1/6/2025   NOV DMA: Visit date not found  FUTURE APPT: No future appointments.    Thank you.

## 2025-01-16 RX ORDER — FLUTICASONE FUROATE AND VILANTEROL TRIFENATATE 200; 25 UG/1; UG/1
POWDER RESPIRATORY (INHALATION)
Qty: 60 EACH | Refills: 13 | Status: SHIPPED | OUTPATIENT
Start: 2025-01-16

## 2025-01-16 RX ORDER — INSULIN GLARGINE 100 [IU]/ML
INJECTION, SOLUTION SUBCUTANEOUS
Qty: 15 ML | Refills: 10 | Status: SHIPPED | OUTPATIENT
Start: 2025-01-16

## 2025-01-16 RX ORDER — APIXABAN 5 MG/1
TABLET, FILM COATED ORAL
Qty: 60 TABLET | Refills: 10 | Status: SHIPPED | OUTPATIENT
Start: 2025-01-16

## 2025-01-16 RX ORDER — ALBUTEROL SULFATE 90 UG/1
INHALANT RESPIRATORY (INHALATION)
Qty: 8.5 G | Refills: 10 | Status: SHIPPED | OUTPATIENT
Start: 2025-01-16

## 2025-01-28 ENCOUNTER — OFFICE VISIT (OUTPATIENT)
Facility: CLINIC | Age: 89
End: 2025-01-28
Payer: MEDICARE

## 2025-01-28 ENCOUNTER — HOSPITAL ENCOUNTER (OUTPATIENT)
Facility: HOSPITAL | Age: 89
Setting detail: SPECIMEN
Discharge: HOME | End: 2025-01-31
Payer: MEDICARE

## 2025-01-28 VITALS
WEIGHT: 109 LBS | HEART RATE: 77 BPM | HEIGHT: 67 IN | OXYGEN SATURATION: 100 % | RESPIRATION RATE: 17 BRPM | DIASTOLIC BLOOD PRESSURE: 67 MMHG | SYSTOLIC BLOOD PRESSURE: 113 MMHG | TEMPERATURE: 97.6 F | BODY MASS INDEX: 17.11 KG/M2

## 2025-01-28 DIAGNOSIS — I26.99 BILATERAL PULMONARY EMBOLISM (HCC): Primary | ICD-10-CM

## 2025-01-28 DIAGNOSIS — J43.1 PANLOBULAR EMPHYSEMA (HCC): ICD-10-CM

## 2025-01-28 DIAGNOSIS — N40.1 BENIGN PROSTATIC HYPERPLASIA WITH POST-VOID DRIBBLING: ICD-10-CM

## 2025-01-28 DIAGNOSIS — N39.43 BENIGN PROSTATIC HYPERPLASIA WITH POST-VOID DRIBBLING: ICD-10-CM

## 2025-01-28 DIAGNOSIS — Z79.4 TYPE 2 DIABETES MELLITUS WITH HYPOGLYCEMIA WITHOUT COMA, WITH LONG-TERM CURRENT USE OF INSULIN (HCC): ICD-10-CM

## 2025-01-28 DIAGNOSIS — E11.649 TYPE 2 DIABETES MELLITUS WITH HYPOGLYCEMIA WITHOUT COMA, WITH LONG-TERM CURRENT USE OF INSULIN (HCC): ICD-10-CM

## 2025-01-28 DIAGNOSIS — E11.21 TYPE 2 DIABETES WITH NEPHROPATHY (HCC): ICD-10-CM

## 2025-01-28 DIAGNOSIS — F17.210 NICOTINE DEPENDENCE, CIGARETTES, UNCOMPLICATED: ICD-10-CM

## 2025-01-28 LAB
EST. AVERAGE GLUCOSE BLD GHB EST-MCNC: 177 MG/DL
HBA1C MFR BLD: 7.8 % (ref 4.2–5.6)

## 2025-01-28 PROCEDURE — 4004F PT TOBACCO SCREEN RCVD TLK: CPT | Performed by: FAMILY MEDICINE

## 2025-01-28 PROCEDURE — 36415 COLL VENOUS BLD VENIPUNCTURE: CPT

## 2025-01-28 PROCEDURE — 99214 OFFICE O/P EST MOD 30 MIN: CPT | Performed by: FAMILY MEDICINE

## 2025-01-28 PROCEDURE — 83036 HEMOGLOBIN GLYCOSYLATED A1C: CPT

## 2025-01-28 PROCEDURE — 1124F ACP DISCUSS-NO DSCNMKR DOCD: CPT | Performed by: FAMILY MEDICINE

## 2025-01-28 PROCEDURE — G8419 CALC BMI OUT NRM PARAM NOF/U: HCPCS | Performed by: FAMILY MEDICINE

## 2025-01-28 PROCEDURE — 3023F SPIROM DOC REV: CPT | Performed by: FAMILY MEDICINE

## 2025-01-28 PROCEDURE — G8427 DOCREV CUR MEDS BY ELIG CLIN: HCPCS | Performed by: FAMILY MEDICINE

## 2025-01-28 SDOH — ECONOMIC STABILITY: FOOD INSECURITY: WITHIN THE PAST 12 MONTHS, YOU WORRIED THAT YOUR FOOD WOULD RUN OUT BEFORE YOU GOT MONEY TO BUY MORE.: NEVER TRUE

## 2025-01-28 SDOH — ECONOMIC STABILITY: FOOD INSECURITY: WITHIN THE PAST 12 MONTHS, THE FOOD YOU BOUGHT JUST DIDN'T LAST AND YOU DIDN'T HAVE MONEY TO GET MORE.: NEVER TRUE

## 2025-01-28 ASSESSMENT — PATIENT HEALTH QUESTIONNAIRE - PHQ9
9. THOUGHTS THAT YOU WOULD BE BETTER OFF DEAD, OR OF HURTING YOURSELF: NOT AT ALL
3. TROUBLE FALLING OR STAYING ASLEEP: NOT AT ALL
6. FEELING BAD ABOUT YOURSELF - OR THAT YOU ARE A FAILURE OR HAVE LET YOURSELF OR YOUR FAMILY DOWN: NOT AT ALL
SUM OF ALL RESPONSES TO PHQ QUESTIONS 1-9: 0
SUM OF ALL RESPONSES TO PHQ QUESTIONS 1-9: 0
1. LITTLE INTEREST OR PLEASURE IN DOING THINGS: NOT AT ALL
5. POOR APPETITE OR OVEREATING: NOT AT ALL
SUM OF ALL RESPONSES TO PHQ9 QUESTIONS 1 & 2: 0
2. FEELING DOWN, DEPRESSED OR HOPELESS: NOT AT ALL
8. MOVING OR SPEAKING SO SLOWLY THAT OTHER PEOPLE COULD HAVE NOTICED. OR THE OPPOSITE, BEING SO FIGETY OR RESTLESS THAT YOU HAVE BEEN MOVING AROUND A LOT MORE THAN USUAL: NOT AT ALL
10. IF YOU CHECKED OFF ANY PROBLEMS, HOW DIFFICULT HAVE THESE PROBLEMS MADE IT FOR YOU TO DO YOUR WORK, TAKE CARE OF THINGS AT HOME, OR GET ALONG WITH OTHER PEOPLE: NOT DIFFICULT AT ALL
4. FEELING TIRED OR HAVING LITTLE ENERGY: NOT AT ALL
7. TROUBLE CONCENTRATING ON THINGS, SUCH AS READING THE NEWSPAPER OR WATCHING TELEVISION: NOT AT ALL
SUM OF ALL RESPONSES TO PHQ QUESTIONS 1-9: 0
SUM OF ALL RESPONSES TO PHQ QUESTIONS 1-9: 0

## 2025-01-28 NOTE — PROGRESS NOTES
Tony Calle is a 89 y.o.  male and presents with    Chief Complaint   Patient presents with    Follow-up    Medication Refill       Subjective:  Mr. Calle presents with his wife after ER evaluations for chest pain and loss of appetite.  He has run out of eliquis and needs renewed Rx.  He has LUTS bothersome; urology evaluation yesterday showed enlarged prostate and need for greenlight laser therapy.    He has had lightheadedness; he is eating 3 meals a day.  He drinks 2-3 16oz bottles of water a day.  His lowest glucose is 60.  He has candy to use.      He is following up for diabetes; his fasting glucose is in low 100s.      He has emphysema and  needs referral to pulmonologist.    ROS   General ROS: positive for  - fatigue and chills  negative for - fever  Psychological ROS: negative  Ophthalmic ROS: positive for - uses glasses  Endocrine ROS: positive for - hyperglycemia  Respiratory ROS: positive for - cough  Cardiovascular ROS: no chest pain or dyspnea on exertion  Gastrointestinal ROS: no abdominal pain, change in bowel habits, or black or bloody stools  Genito-Urinary ROS: no dysuria, trouble voiding, or hematuria  Musculoskeletal ROS: positive for - muscle pain  Neurological ROS: positive for - numbness/tingling  Dermatological ROS: negative for - rash    All other systems reviewed and are negative.      Objective:  Vitals:    01/28/25 0754   BP: 113/67   Pulse: 77   Resp: 17   Temp: 97.6 °F (36.4 °C)   SpO2: 100%     alert, well appearing, and in no distress, oriented to person, place, and time, and thin  Mental status - normal mood, behavior, speech, dress, motor activity, and thought processes  Chest - inspiratory rhonchi  Heart - normal rate, regular rhythm, normal S1, S2, no murmurs, rubs, clicks or gallops  Neurological - cranial nerves II through XII intact    LABS   Glucose 106  TESTS  CT chest   IMPRESSION:   1.  No PE, aneurysm or dissection. Pulmonary emboli present previously

## 2025-01-28 NOTE — PROGRESS NOTES
Tony Calle is a 89 y.o. year old male who presents today for   Chief Complaint   Patient presents with    Follow-up        \"Have you been to the ER, urgent care clinic since your last visit?  Hospitalized since your last visit?\"   YES - When: approximately   ago.  Where and Why:  .     “Have you seen or consulted any other health care providers outside our system since your last visit?”   NO             Misty Shah [facility]  [address] [place] [address] [address]  Ph: [phone]  Direct Fax: [phone]

## 2025-02-06 ENCOUNTER — TELEPHONE (OUTPATIENT)
Facility: CLINIC | Age: 89
End: 2025-02-06

## 2025-02-06 NOTE — TELEPHONE ENCOUNTER
Nilsa from [facility] -[phone] ci to advise pcp that the pt might be hoing to the er since he hasn't being doing well :         rapid heartbeat (106)  he hasnt taken insulin in the past two days numbers are high (274 highest)         Please advise    thankyou

## 2025-02-18 ENCOUNTER — OFFICE VISIT (OUTPATIENT)
Facility: CLINIC | Age: 89
End: 2025-02-18

## 2025-02-18 VITALS
WEIGHT: 110.6 LBS | TEMPERATURE: 97.7 F | OXYGEN SATURATION: 95 % | DIASTOLIC BLOOD PRESSURE: 66 MMHG | BODY MASS INDEX: 17.36 KG/M2 | SYSTOLIC BLOOD PRESSURE: 129 MMHG | HEIGHT: 67 IN | RESPIRATION RATE: 18 BRPM | HEART RATE: 74 BPM

## 2025-02-18 DIAGNOSIS — J43.1 PANLOBULAR EMPHYSEMA (HCC): ICD-10-CM

## 2025-02-18 DIAGNOSIS — R33.9 URINARY RETENTION: ICD-10-CM

## 2025-02-18 DIAGNOSIS — F17.210 NICOTINE DEPENDENCE, CIGARETTES, UNCOMPLICATED: ICD-10-CM

## 2025-02-18 DIAGNOSIS — E11.21 TYPE 2 DIABETES WITH NEPHROPATHY (HCC): ICD-10-CM

## 2025-02-18 DIAGNOSIS — J44.1 COPD EXACERBATION (HCC): ICD-10-CM

## 2025-02-18 DIAGNOSIS — Z09 HOSPITAL DISCHARGE FOLLOW-UP: Primary | ICD-10-CM

## 2025-02-18 DIAGNOSIS — K92.2 GASTROINTESTINAL HEMORRHAGE, UNSPECIFIED GASTROINTESTINAL HEMORRHAGE TYPE: ICD-10-CM

## 2025-02-18 LAB — HEMOGLOBIN, POC: 9.4 G/DL

## 2025-02-18 RX ORDER — ALBUTEROL SULFATE 0.83 MG/ML
2.5 SOLUTION RESPIRATORY (INHALATION) 4 TIMES DAILY PRN
Qty: 120 EACH | Refills: 3 | Status: SHIPPED | OUTPATIENT
Start: 2025-02-18

## 2025-02-18 RX ORDER — AZITHROMYCIN 250 MG/1
TABLET, FILM COATED ORAL
Qty: 6 TABLET | Refills: 0 | Status: SHIPPED | OUTPATIENT
Start: 2025-02-18 | End: 2025-02-28

## 2025-02-18 RX ORDER — PREDNISONE 10 MG/1
TABLET ORAL
Qty: 30 TABLET | Refills: 0 | Status: SHIPPED | OUTPATIENT
Start: 2025-02-18

## 2025-02-18 ASSESSMENT — PATIENT HEALTH QUESTIONNAIRE - PHQ9
3. TROUBLE FALLING OR STAYING ASLEEP: NOT AT ALL
7. TROUBLE CONCENTRATING ON THINGS, SUCH AS READING THE NEWSPAPER OR WATCHING TELEVISION: NOT AT ALL
2. FEELING DOWN, DEPRESSED OR HOPELESS: SEVERAL DAYS
SUM OF ALL RESPONSES TO PHQ QUESTIONS 1-9: 5
10. IF YOU CHECKED OFF ANY PROBLEMS, HOW DIFFICULT HAVE THESE PROBLEMS MADE IT FOR YOU TO DO YOUR WORK, TAKE CARE OF THINGS AT HOME, OR GET ALONG WITH OTHER PEOPLE: NOT DIFFICULT AT ALL
SUM OF ALL RESPONSES TO PHQ QUESTIONS 1-9: 5
4. FEELING TIRED OR HAVING LITTLE ENERGY: SEVERAL DAYS
1. LITTLE INTEREST OR PLEASURE IN DOING THINGS: SEVERAL DAYS
SUM OF ALL RESPONSES TO PHQ QUESTIONS 1-9: 5
6. FEELING BAD ABOUT YOURSELF - OR THAT YOU ARE A FAILURE OR HAVE LET YOURSELF OR YOUR FAMILY DOWN: NOT AT ALL
9. THOUGHTS THAT YOU WOULD BE BETTER OFF DEAD, OR OF HURTING YOURSELF: NOT AT ALL
8. MOVING OR SPEAKING SO SLOWLY THAT OTHER PEOPLE COULD HAVE NOTICED. OR THE OPPOSITE, BEING SO FIGETY OR RESTLESS THAT YOU HAVE BEEN MOVING AROUND A LOT MORE THAN USUAL: SEVERAL DAYS
SUM OF ALL RESPONSES TO PHQ9 QUESTIONS 1 & 2: 2
SUM OF ALL RESPONSES TO PHQ QUESTIONS 1-9: 5
5. POOR APPETITE OR OVEREATING: SEVERAL DAYS

## 2025-02-18 NOTE — PROGRESS NOTES
Pharmacy Progress Note - CGM Sensor Replacement    Patient presented to office for PCP appointment and requested for his Alin 2 sensor to be applied today in office. Placed on the back of the upper right arm and activated to initiate 60 minute warm up period.     Thank you,   Nilda Mckinnon, PharmD  Clinical Pharmacist Specialist  2/18/25     For Pharmacy Admin Tracking Only    Program: Medical Group  CPA in place:  Yes  Recommendation Provided To: Patient/Caregiver: 0 via In person  Intervention Accepted By: Patient/Caregiver: 0  Gap Closed?: No   Time Spent (min): 5

## 2025-02-18 NOTE — PROGRESS NOTES
Tony Calle is a 89 y.o. year old male who presents today for   Chief Complaint   Patient presents with    Follow-Up from Hospital        \"Have you been to the ER, urgent care clinic since your last visit?  Hospitalized since your last visit?\"   YES - When: approximately 11 ago.  Where and Why: [facility].     “Have you seen or consulted any other health care providers outside our system since your last visit?”   NO             Elvira Penny [facility]  Phone: [phone]  Fax: [phone]

## 2025-02-18 NOTE — PROGRESS NOTES
Tony Calle is a 89 y.o.  male and presents with    Chief Complaint   Patient presents with    Follow-Up from Hospital    Nausea & Vomiting           Subjective:  Mr. Calle with h/o of bilateral acute PE and acute left DVT in 12/2024 (for which he takes Eliquis), T2DM, prostatitis, and BPH who presents to clinic for ED f/u. Patient was seen on 02/07 for syncope. Noted anorexia.     Discharge Diagnosis:   Failure to thrive  Moderate malnutrition  Iron deficiency anemia  Acute urinary retention  NSTEMI, type 2     Workup showed malnutrition and iron deficiency anemia. Anorexia of unknown origin - told to schedule colonoscopy for malignant cause. Discharged with mirtazapine, given heart monitor for one month, told to f/u with urology for catheter.     Has noted nausea/vomiting this morning.       ROS   General ROS: positive for  - fatigue and chills  negative for - fever  Psychological ROS: negative  Ophthalmic ROS: positive for - uses glasses  Endocrine ROS: positive for - hyperglycemia  Respiratory ROS: positive for - cough  Cardiovascular ROS: no chest pain or dyspnea on exertion  Gastrointestinal ROS: no abdominal pain, change in bowel habits, or black or bloody stools  Genito-Urinary ROS: no dysuria, trouble voiding, or hematuria  Musculoskeletal ROS: positive for - muscle pain  Neurological ROS: positive for - numbness/tingling  Dermatological ROS: negative for - rash       All other systems reviewed and are negative.      Objective:  /66 (Site: Left Upper Arm, Position: Sitting, Cuff Size: Medium Adult)   Pulse 74   Temp 97.7 °F (36.5 °C) (Temporal)   Resp 18   Ht 1.702 m (5' 7\")   Wt 50.2 kg (110 lb 9.6 oz)   SpO2 95%   BMI 17.32 kg/m²       alert, well appearing, and in no distress, oriented to person, place, and time, and thin  Mental status - normal mood, behavior, speech, dress, motor activity, and thought processes  Chest - inspiratory rhonchi  Heart - normal rate, regular

## 2025-05-01 ENCOUNTER — OFFICE VISIT (OUTPATIENT)
Facility: CLINIC | Age: 89
End: 2025-05-01
Payer: MEDICARE

## 2025-05-01 VITALS
BODY MASS INDEX: 16.73 KG/M2 | RESPIRATION RATE: 20 BRPM | HEIGHT: 67 IN | TEMPERATURE: 97.2 F | HEART RATE: 82 BPM | SYSTOLIC BLOOD PRESSURE: 127 MMHG | WEIGHT: 106.6 LBS | DIASTOLIC BLOOD PRESSURE: 77 MMHG | OXYGEN SATURATION: 93 %

## 2025-05-01 DIAGNOSIS — E11.21 TYPE 2 DIABETES WITH NEPHROPATHY (HCC): ICD-10-CM

## 2025-05-01 DIAGNOSIS — E53.8 VITAMIN B 12 DEFICIENCY: ICD-10-CM

## 2025-05-01 DIAGNOSIS — L85.3 DRY SKIN: ICD-10-CM

## 2025-05-01 DIAGNOSIS — R29.898 WEAKNESS OF BOTH LOWER EXTREMITIES: ICD-10-CM

## 2025-05-01 DIAGNOSIS — J43.1 PANLOBULAR EMPHYSEMA (HCC): ICD-10-CM

## 2025-05-01 DIAGNOSIS — R29.6 FREQUENT FALLS: Primary | ICD-10-CM

## 2025-05-01 DIAGNOSIS — I26.99 BILATERAL PULMONARY EMBOLISM (HCC): ICD-10-CM

## 2025-05-01 LAB — HBA1C MFR BLD: 6.5 %

## 2025-05-01 PROCEDURE — 99214 OFFICE O/P EST MOD 30 MIN: CPT | Performed by: FAMILY MEDICINE

## 2025-05-01 PROCEDURE — 3023F SPIROM DOC REV: CPT | Performed by: FAMILY MEDICINE

## 2025-05-01 PROCEDURE — 4004F PT TOBACCO SCREEN RCVD TLK: CPT | Performed by: FAMILY MEDICINE

## 2025-05-01 PROCEDURE — G2211 COMPLEX E/M VISIT ADD ON: HCPCS | Performed by: FAMILY MEDICINE

## 2025-05-01 PROCEDURE — G8427 DOCREV CUR MEDS BY ELIG CLIN: HCPCS | Performed by: FAMILY MEDICINE

## 2025-05-01 PROCEDURE — G8419 CALC BMI OUT NRM PARAM NOF/U: HCPCS | Performed by: FAMILY MEDICINE

## 2025-05-01 PROCEDURE — 83036 HEMOGLOBIN GLYCOSYLATED A1C: CPT | Performed by: FAMILY MEDICINE

## 2025-05-01 PROCEDURE — 1124F ACP DISCUSS-NO DSCNMKR DOCD: CPT | Performed by: FAMILY MEDICINE

## 2025-05-01 PROCEDURE — 3044F HG A1C LEVEL LT 7.0%: CPT | Performed by: FAMILY MEDICINE

## 2025-05-01 RX ORDER — TRIAMCINOLONE ACETONIDE 0.25 MG/G
CREAM TOPICAL
Qty: 454 G | Refills: 1 | Status: SHIPPED | OUTPATIENT
Start: 2025-05-01

## 2025-05-01 SDOH — ECONOMIC STABILITY: FOOD INSECURITY: WITHIN THE PAST 12 MONTHS, THE FOOD YOU BOUGHT JUST DIDN'T LAST AND YOU DIDN'T HAVE MONEY TO GET MORE.: NEVER TRUE

## 2025-05-01 SDOH — ECONOMIC STABILITY: FOOD INSECURITY: WITHIN THE PAST 12 MONTHS, YOU WORRIED THAT YOUR FOOD WOULD RUN OUT BEFORE YOU GOT MONEY TO BUY MORE.: NEVER TRUE

## 2025-05-01 ASSESSMENT — PATIENT HEALTH QUESTIONNAIRE - PHQ9
5. POOR APPETITE OR OVEREATING: NOT AT ALL
7. TROUBLE CONCENTRATING ON THINGS, SUCH AS READING THE NEWSPAPER OR WATCHING TELEVISION: NOT AT ALL
4. FEELING TIRED OR HAVING LITTLE ENERGY: NOT AT ALL
2. FEELING DOWN, DEPRESSED OR HOPELESS: NOT AT ALL
9. THOUGHTS THAT YOU WOULD BE BETTER OFF DEAD, OR OF HURTING YOURSELF: NOT AT ALL
SUM OF ALL RESPONSES TO PHQ QUESTIONS 1-9: 0
SUM OF ALL RESPONSES TO PHQ QUESTIONS 1-9: 0
10. IF YOU CHECKED OFF ANY PROBLEMS, HOW DIFFICULT HAVE THESE PROBLEMS MADE IT FOR YOU TO DO YOUR WORK, TAKE CARE OF THINGS AT HOME, OR GET ALONG WITH OTHER PEOPLE: NOT DIFFICULT AT ALL
3. TROUBLE FALLING OR STAYING ASLEEP: NOT AT ALL
8. MOVING OR SPEAKING SO SLOWLY THAT OTHER PEOPLE COULD HAVE NOTICED. OR THE OPPOSITE, BEING SO FIGETY OR RESTLESS THAT YOU HAVE BEEN MOVING AROUND A LOT MORE THAN USUAL: NOT AT ALL
1. LITTLE INTEREST OR PLEASURE IN DOING THINGS: NOT AT ALL
SUM OF ALL RESPONSES TO PHQ QUESTIONS 1-9: 0
SUM OF ALL RESPONSES TO PHQ QUESTIONS 1-9: 0
6. FEELING BAD ABOUT YOURSELF - OR THAT YOU ARE A FAILURE OR HAVE LET YOURSELF OR YOUR FAMILY DOWN: NOT AT ALL

## 2025-05-01 NOTE — PROGRESS NOTES
Tony Calle is a 89 y.o.  male and presents with    Chief Complaint   Patient presents with    ITCHING     ARMS    KNEE BUCKLING    Fall    Medication Refill       Subjective:  Mr. Calle c/o frequent falls; he last fell yesterday when his legs gave out on him.      He is following up for diabetes; his fasting glucose was 160 today.  He is using glargine 15 units daily.  No preprandial insulin       He has emphysema and  needs referral to pulmonologist.    ROS   General ROS: positive for  - fatigue and chills  negative for - fever  Psychological ROS: negative  Ophthalmic ROS: positive for - uses glasses  Endocrine ROS: positive for - hyperglycemia  Respiratory ROS: positive for - cough  Cardiovascular ROS: no chest pain or dyspnea on exertion  Gastrointestinal ROS: no abdominal pain, change in bowel habits, or black or bloody stools  Genito-Urinary ROS: no dysuria, trouble voiding, or hematuria  Musculoskeletal ROS: positive for - muscle pain  Neurological ROS: positive for - numbness/tingling  Dermatological ROS: negative for - rash    All other systems reviewed and are negative.      Objective:  Vitals:    05/01/25 0920   BP: 127/77   Pulse: 82   Resp: 20   Temp: 97.2 °F (36.2 °C)   SpO2: 93%         alert, well appearing, and in no distress, oriented to person, place, and time, and thin  Mental status - normal mood, behavior, speech, dress, motor activity, and thought processes  Chest - clear to auscultation, no wheezes, rales or rhonchi, symmetric air entry  Heart - normal rate, regular rhythm, normal S1, S2, no murmurs, rubs, clicks or gallops  Neurological - cranial nerves II through XII intact, guarded gait  Skin - dry skin    LABS   Hgb A1c 6.5  TESTS      Assessment/Plan:    1. Bilateral pulmonary embolism (HCC)  Continue anticoagulation  - apixaban (ELIQUIS) 5 MG TABS tablet; Take 1 tablet by mouth 2 times daily  Dispense: 60 tablet; Refill: 10    2. Frequent falls  Refer to physical 
Tony Calle is a 89 y.o. year old male who presents today for No chief complaint on file.       \"Have you been to the ER, urgent care clinic since your last visit?  Hospitalized since your last visit?\"   NO     “Have you seen or consulted any other health care providers outside our system since your last visit?”   NO             Elvira Penny [facility]  Phone: [phone]  Fax: [phone]  
no

## (undated) DEVICE — SUTURE PERMAHAND SZ 3-0 L30IN NONABSORBABLE BLK W/O NDL SA84H

## (undated) DEVICE — SOLUTION IRRIG 1000ML H2O STRL BLT

## (undated) DEVICE — BLADE ELECTRODE: Brand: VALLEYLAB

## (undated) DEVICE — STERILE POLYISOPRENE POWDER-FREE SURGICAL GLOVES: Brand: PROTEXIS

## (undated) DEVICE — Z DISCONTINUED USE 2219801 STAPLER SKIN REG CRWN L5.7MM LEG L3.9MM WIRE DIA0.53MM PROX

## (undated) DEVICE — SUTURE VCRL SZ 3-0 L18IN ABSRB UD POLYGLACTIN 910 BRAID TIE J910T

## (undated) DEVICE — SUTURE PERMAHAND SZ 2-0 L12X18IN NONABSORBABLE BLK SILK A185H

## (undated) DEVICE — SUTURE PERMAHAND SZ 3-0 L18IN NONABSORBABLE BLK SILK BRAID A184H

## (undated) DEVICE — CLIP LIG ADH PD W SLOT HORZ --

## (undated) DEVICE — KIT CATH OD16FR 5ML BLLN SIL URIN INDWL STR TIP INF CTRL

## (undated) DEVICE — DEPAUL MAJOR PROCEDURE PACK: Brand: MEDLINE INDUSTRIES, INC.

## (undated) DEVICE — ZINACTIVE USE 2641837 CLIP LIG M BLU TI HRT SHP WIRE HORZ 600 PER BX

## (undated) DEVICE — SUTURE VCRL SZ 0 L36IN ABSRB UD L36MM CT-1 1/2 CIR J946H

## (undated) DEVICE — (D)PREP SKN CHLRAPRP APPL 26ML -- CONVERT TO ITEM 371833

## (undated) DEVICE — SOLUTION IV 1000ML 0.9% SOD CHL

## (undated) DEVICE — SUTURE VCRL SZ 3-0 L27IN ABSRB UD L26MM SH 1/2 CIR J416H

## (undated) DEVICE — SUTURE PERMAHAND SZ 3-0 L18IN NONABSORBABLE BLK L26MM SH C013D

## (undated) DEVICE — SUTURE PERMAHAND SZ 2-0 L18IN NONABSORBABLE BLK L26MM SH C012D

## (undated) DEVICE — (D)SLEEVE COMPR SCD CLF STD -- DISC BY MFR NO SUB

## (undated) DEVICE — SPONGE: SPECIALTY CHERRY DISS XR 100/CS: Brand: MEDICAL ACTION INDUSTRIES

## (undated) DEVICE — SUTURE VCRL SZ 2-0 L12X18IN ABSRB UD POLYGLACTIN 910 BRAID J911T